# Patient Record
Sex: FEMALE | Race: WHITE | NOT HISPANIC OR LATINO | Employment: OTHER | URBAN - METROPOLITAN AREA
[De-identification: names, ages, dates, MRNs, and addresses within clinical notes are randomized per-mention and may not be internally consistent; named-entity substitution may affect disease eponyms.]

---

## 2017-01-03 ENCOUNTER — OFFICE VISIT (OUTPATIENT)
Dept: OPHTHALMOLOGY | Facility: CLINIC | Age: 82
End: 2017-01-03
Payer: MEDICARE

## 2017-01-03 DIAGNOSIS — H40.1122 PRIMARY OPEN ANGLE GLAUCOMA OF LEFT EYE, MODERATE STAGE: ICD-10-CM

## 2017-01-03 DIAGNOSIS — H21.561 AFFERENT PUPILLARY DEFECT, RIGHT: ICD-10-CM

## 2017-01-03 DIAGNOSIS — H35.3230 BILATERAL EXUDATIVE AGE-RELATED MACULAR DEGENERATION: ICD-10-CM

## 2017-01-03 DIAGNOSIS — H43.813 POSTERIOR VITREOUS DETACHMENT, BOTH EYES: ICD-10-CM

## 2017-01-03 DIAGNOSIS — H40.1413 PSEUDOEXFOLIATIVE GLAUCOMA, RIGHT EYE, SEVERE STAGE: Primary | ICD-10-CM

## 2017-01-03 DIAGNOSIS — Z96.1 PSEUDOPHAKIA OF BOTH EYES: ICD-10-CM

## 2017-01-03 DIAGNOSIS — H02.409 PTOSIS OF EYELID, UNSPECIFIED LATERALITY: ICD-10-CM

## 2017-01-03 DIAGNOSIS — M35.01 KCS (KERATOCONJUNCTIVITIS SICCA): ICD-10-CM

## 2017-01-03 PROCEDURE — 92012 INTRM OPH EXAM EST PATIENT: CPT | Mod: S$PBB,,, | Performed by: OPHTHALMOLOGY

## 2017-01-03 PROCEDURE — 99212 OFFICE O/P EST SF 10 MIN: CPT | Mod: PBBFAC | Performed by: OPHTHALMOLOGY

## 2017-01-03 PROCEDURE — 99999 PR PBB SHADOW E&M-EST. PATIENT-LVL II: CPT | Mod: PBBFAC,,, | Performed by: OPHTHALMOLOGY

## 2017-01-03 NOTE — PROGRESS NOTES
HPI     DLS: 8/26/16    Pt here for 4 month IOP check;    Meds: No GTTS    1. Primary open angle glaucoma of left eye, moderate stage  2. Pseudoexfoliation glaucoma, severe stage  3. Ptosis of eyelid, unspecified laterality  4. Bilateral exudative age-related macular degeneration  5. Pseudoexfoliative glaucoma, right eye, severe stage  6. Posterior vitreous detachment, both eyes  7. Conjunctivitis, unspecified conjunctivitis type, unspecified laterality  8. Bilateral noexudative age-related macular degeneration  9. Pseudophakia, both eyes          Last edited by Briseida Mendoza on 1/3/2017  9:30 AM.         Assessment /Plan     For exam results, see Encounter Report.    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 Open-angle glaucoma, moderate stage   PsEx - moderate stage OD // POAG - mild stage os    First HVF   2013   First photos   5/2014   Treatment / Drops started   combigan, travatan (started in [place] after [place])            Family history    + both parents        Glaucoma meds    Off all gtts  (use to use Combigan od , travatan od )         H/O adverse rxn to glaucoma drops    Stopped combigan 2/2 low BP // stopped PG's 2/2 wet armd // stopped dorzolamide 2/2 dermatitis (( allergic conj od 2/2  "alphagan P and/or willard)         LASERS    SLT od 10/15/2015 - no response (IOP went up)         GLAUCOMA SURGERIES    Ahmed OD 3/2/2016        OTHER EYE SURGERIES    CE/IOL OU        CDR    0.9/0.4        Tbase    10-19/10 - 16          Tmax    19/16 (on gtts)            Ttarget    17/17             HVF    3 test 2013 to  2015 - SAD od // gen dep - new  os        Gonio    +3-4 OU        CCT    519/528        OCT    1 test 2014 to 2014 - RNFL - dec I, bord S od // wnl os        HRT    4 test 2014 to 2016 - MR -  High std dev. od // dec. I os /// CDR xx od // 0.55 os         Disc photos    2014 - OIS    - Ttoday  16//15  (( priviously 41/ 17 (off all gtts - 9/21/2015))   - Test today: IOP post ahmed od    2. Posterior vitreous detachment, both eyes      3. Bilateral exudative age-related macular degeneration s/p avastin ou  Loas avastin 11/24/2015 - ou - [facility]      4 Ptosis OS  S/P lid lift ou   Still with mild ptosis OS     5. +APD OD - mild     6. WILLIAM  Uses systane      7.  Pseudophakia OU  Done in [place] after [place]         Plan:  Most likely allergic conjunctivitiis 2/2 to either alphagan or willard od   Possibly is a viral conjuntivitis - but more likely 2/2 glaucoma gtts   PLAN - take a "vacation " from all glaucoma gtts od   IOP high od off all gtts - had ahmed od 3/2/2016 - IOP ok post op   Intolerant to all gtts   Still with mild hyperemia od     PsExGl od - severe / POAG os - mild  - very assymetric c/d OU- mildly thin corneas- open on gonio- +family history  Patient was started on glaucoma treatment after [place]- went to [place] after [place]- now back here  Was seen by Dr. Blake ([facility])  and Dr. Ontiveros and sent for further glaucoma eval  HVF show SAD OU however appears to have worsened OS- patient with significant ptosis OS- could be lid artifact?-   Optic disc OS does not appear to be as bad as HVF shows- looks very healthy    ++ APD od - old    If needs yag cap od - may have weak zonules - " + PsEx at pupil margin  Minimal pco - NOT vis sign    Seeing [facility] - PED - s/p avastin x 2 ou - keep F/u appt for ? More avastin   VA os improving  + armd - see OCT doen 4/7/2015 // PED w/ SR fluid ou   S/P last avastin 1/26/2016 (VA was poor od at 20/200E@6' and much better os at that visit)       S/P  ahmed implant od 3/2/2016   Intol to all gtts   Off PG's per [facility]  Intol to dorzolamide - dermatitis  Intol to alphagan - marked hyperemia  Intol to willard - hypermia   -into to BB - low BP and HR     POM 9 s/p GDD / ahmed od to try and control IOP od on 3/2/16 ++ PsExGl)   -Pressure doing well today at 16 OD    Currently on no drops      F/U 4 months - with HVF - try 24-2 stim V od and 24-2 ss os // DFE // OCT     Saw [facility] 8/2016 - no need for glasses

## 2017-01-17 ENCOUNTER — PROCEDURE VISIT (OUTPATIENT)
Dept: OPHTHALMOLOGY | Facility: CLINIC | Age: 82
End: 2017-01-17
Payer: MEDICARE

## 2017-01-17 VITALS — SYSTOLIC BLOOD PRESSURE: 131 MMHG | DIASTOLIC BLOOD PRESSURE: 70 MMHG | HEART RATE: 63 BPM

## 2017-01-17 DIAGNOSIS — H43.813 POSTERIOR VITREOUS DETACHMENT, BOTH EYES: ICD-10-CM

## 2017-01-17 DIAGNOSIS — H35.3231 EXUDATIVE AGE-RELATED MACULAR DEGENERATION, BILATERAL, WITH ACTIVE CHOROIDAL NEOVASCULARIZATION: Primary | ICD-10-CM

## 2017-01-17 PROCEDURE — 67028 INJECTION EYE DRUG: CPT | Mod: PBBFAC,RT | Performed by: OPHTHALMOLOGY

## 2017-01-17 PROCEDURE — C9257 BEVACIZUMAB INJECTION: HCPCS | Mod: PBBFAC | Performed by: OPHTHALMOLOGY

## 2017-01-17 PROCEDURE — 67028 INJECTION EYE DRUG: CPT | Mod: S$PBB,RT,, | Performed by: OPHTHALMOLOGY

## 2017-01-17 PROCEDURE — 92014 COMPRE OPH EXAM EST PT 1/>: CPT | Mod: 25,S$PBB,, | Performed by: OPHTHALMOLOGY

## 2017-01-17 PROCEDURE — 92134 CPTRZ OPH DX IMG PST SGM RTA: CPT | Mod: PBBFAC | Performed by: OPHTHALMOLOGY

## 2017-01-17 RX ADMIN — BEVACIZUMAB 1.25 MG: 100 INJECTION, SOLUTION INTRAVENOUS at 02:01

## 2017-01-17 NOTE — PROGRESS NOTES
OCT - Central PED's with no SRF OD   , OS - stable no SRF    Previous FA - OD - Late CME leakage  OS - minimal late PED leakage    1. AMD OU  - History of drusenoid PED's  - 4/15 - developed foveal SRF OU  - OCT today stable  - S/p Avastin OU x 9   - consider Avastin OD #10 today   - Also with subfoveal RPE loss - may not improve with injections  - AREDS/AG  - CME component OD - DC travatan (06/2015)     Will continue maintenance q 3-4 month OD    2. PCIOL OU    3. Floaters OU    4. POAG   - Off all drops per Dr. Leger, continue f/u as scheduled   - S/p Ahmed valve OD on 3/02/16    5. WILLIAM   - AT 3-4x/day      3-4 months OCT       Risks, benefits, and alternatives to treatment discussed in detail with the patient.  The patient voiced understanding and wished to proceed with the procedure    Injection Procedure Note:  Diagnosis: Wet AMD OD    Topical Proparacaine and Betadine.  Inject Avastin OD at 6:00 @ 3.5-4mm posterior to limbus  Post Operative Dx: Same  Complications: None  Follow up as above.

## 2017-01-17 NOTE — MR AVS SNAPSHOT
[facility] - Ophthalmology  [address]  [facility] [address]  Phone: [phone]  Fax: [phone]                  Elda Cui   2017 1:00 PM   Procedure visit    Description:  Female : 1935   Provider:  OSCAR Osman MD   Department:  [facility] - Ophthalmology           Reason for Visit     Eye Problem           Diagnoses this Visit        Comments    Exudative age-related macular degeneration, bilateral, with active choroidal neovascularization    -  Primary     Posterior vitreous detachment, both eyes                To Do List           Future Appointments        Provider Department Dept Phone    2017 9:15 AM PERIMETRY, HUMP[facility] [phone]    2017 9:45 AM Charlene Leger MD [facility] [phone]      Goals (5 Years of Data)     None      Follow-Up and Disposition     Return in about 4 months (around 2017).      [facility] On Call     [facility] On Call Nurse Care Line - 24/7 Assistance  Registered nurses in the [facility] Center provide clinical advisement, health education, appointment booking, and other advisory services.  Call for this free service at [phone].             Medications           Message regarding Medications     Verify the changes and/or additions to your medication regime listed below are the same as discussed with your clinician today.  If any of these changes or additions are incorrect, please notify your healthcare provider.        These medications were administered today        Dose Freq    bevacizumab (AVASTIN) 2.5 mg/0.10 mL 1.25 mg 1.25 mg Clinic/HOD 1 time    Sig: Inject 0.05 mLs (1.25 mg total) into the eye one time.    Class: Normal    Route: Intraocular           Verify that the below list of medications is an accurate representation of the medications you are currently taking.  If none reported, the list may be blank. If incorrect, please contact your healthcare  provider. Carry this list with you in case of emergency.           Current Medications     ACETAMINOPHEN/PHENYLTOLX CIT (PERCOGESIC)  mg Tab Take 1 tablet by mouth every 4 (four) hours as needed. 1 Tablet Oral    donepezil (ARICEPT) 10 MG tablet Take 1 tablet (10 mg total) by mouth every evening.    fluticasone (FLONASE) 50 mcg/actuation nasal spray 2 sprays by Each Nare route once daily.    glucosamine-chondroitin 500-400 mg tablet Take 1 tablet by mouth 3 (three) times daily.    midodrine (PROAMATINE) 10 MG tablet Take 1 tablet (10 mg total) by mouth 3 (three) times daily.    azelastine (ASTELIN) 137 mcg nasal spray 1-2 sprays (137-274 mcg total) by Nasal route 2 (two) times daily as needed for Rhinitis.           Clinical Reference Information           Vital Signs - Last Recorded  Most recent update: 1/17/2017  1:16 PM by Stacie Laughlin    BP Pulse                131/70 (BP Location: Right arm, Patient Position: Sitting, BP Method: Automatic) 63          Blood Pressure          Most Recent Value    BP  131/70      Allergies as of 1/17/2017     Doxycycline      Immunizations Administered on Date of Encounter - 1/17/2017     None      Orders Placed During Today's Visit      Normal Orders This Visit    Posterior Segment OCT Retina-Both eyes     Prior Authorization Order     Future Labs/Procedures Expected by Expires    Posterior Segment OCT Retina-Both eyes  As directed 1/17/2018

## 2017-01-17 NOTE — PATIENT INSTRUCTIONS

## 2017-01-24 ENCOUNTER — PATIENT MESSAGE (OUTPATIENT)
Dept: NEUROLOGY | Facility: CLINIC | Age: 82
End: 2017-01-24

## 2017-01-25 NOTE — TELEPHONE ENCOUNTER
Can reorder Namenda CR 28 mg daily.  Could you please call patient to see if she had the Namenda CR starter pack or the Namenda generic starter pack. She will either need Namenda 10 mg twice a day or Namenda CR 28 mg daily.

## 2017-01-26 RX ORDER — MEMANTINE HYDROCHLORIDE 28 MG/1
1 CAPSULE, EXTENDED RELEASE ORAL DAILY
Qty: 30 EACH | Refills: 11 | Status: SHIPPED | OUTPATIENT
Start: 2017-01-26 | End: 2018-01-08 | Stop reason: SDUPTHER

## 2017-01-26 RX ORDER — MIDODRINE HYDROCHLORIDE 10 MG/1
10 TABLET ORAL 3 TIMES DAILY
Qty: 90 TABLET | Refills: 11 | Status: SHIPPED | OUTPATIENT
Start: 2017-01-26 | End: 2017-03-31 | Stop reason: SDUPTHER

## 2017-02-23 RX ORDER — DONEPEZIL HYDROCHLORIDE 10 MG/1
TABLET, FILM COATED ORAL
Qty: 30 TABLET | Refills: 0 | Status: SHIPPED | OUTPATIENT
Start: 2017-02-23 | End: 2017-03-23 | Stop reason: SDUPTHER

## 2017-03-03 ENCOUNTER — OFFICE VISIT (OUTPATIENT)
Dept: FAMILY MEDICINE | Facility: CLINIC | Age: 82
End: 2017-03-03
Payer: MEDICARE

## 2017-03-03 ENCOUNTER — LAB VISIT (OUTPATIENT)
Dept: LAB | Facility: HOSPITAL | Age: 82
End: 2017-03-03
Attending: FAMILY MEDICINE
Payer: MEDICARE

## 2017-03-03 VITALS
WEIGHT: 158.31 LBS | OXYGEN SATURATION: 97 % | RESPIRATION RATE: 12 BRPM | DIASTOLIC BLOOD PRESSURE: 76 MMHG | SYSTOLIC BLOOD PRESSURE: 134 MMHG | BODY MASS INDEX: 28.05 KG/M2 | TEMPERATURE: 98 F | HEART RATE: 54 BPM | HEIGHT: 63 IN

## 2017-03-03 DIAGNOSIS — E78.5 HYPERLIPIDEMIA, UNSPECIFIED HYPERLIPIDEMIA TYPE: ICD-10-CM

## 2017-03-03 DIAGNOSIS — Z23 NEED FOR PNEUMOCOCCAL VACCINATION: ICD-10-CM

## 2017-03-03 DIAGNOSIS — M94.9 DISORDER OF BONE AND CARTILAGE: ICD-10-CM

## 2017-03-03 DIAGNOSIS — M89.9 DISORDER OF BONE AND CARTILAGE: ICD-10-CM

## 2017-03-03 DIAGNOSIS — M54.50 ACUTE RIGHT-SIDED LOW BACK PAIN WITHOUT SCIATICA: Primary | ICD-10-CM

## 2017-03-03 DIAGNOSIS — F03.90 DEMENTIA WITHOUT BEHAVIORAL DISTURBANCE, UNSPECIFIED DEMENTIA TYPE: ICD-10-CM

## 2017-03-03 LAB
ALBUMIN SERPL BCP-MCNC: 3.8 G/DL
ALP SERPL-CCNC: 84 U/L
ALT SERPL W/O P-5'-P-CCNC: 29 U/L
ANION GAP SERPL CALC-SCNC: 8 MMOL/L
AST SERPL-CCNC: 30 U/L
BILIRUB SERPL-MCNC: 0.4 MG/DL
BUN SERPL-MCNC: 16 MG/DL
CALCIUM SERPL-MCNC: 9.3 MG/DL
CHLORIDE SERPL-SCNC: 105 MMOL/L
CHOLEST/HDLC SERPL: 4.8 {RATIO}
CO2 SERPL-SCNC: 27 MMOL/L
CREAT SERPL-MCNC: 0.8 MG/DL
EST. GFR  (AFRICAN AMERICAN): >60 ML/MIN/1.73 M^2
EST. GFR  (NON AFRICAN AMERICAN): >60 ML/MIN/1.73 M^2
GLUCOSE SERPL-MCNC: 92 MG/DL
HDL/CHOLESTEROL RATIO: 20.8 %
HDLC SERPL-MCNC: 260 MG/DL
HDLC SERPL-MCNC: 54 MG/DL
LDLC SERPL CALC-MCNC: 158.8 MG/DL
NONHDLC SERPL-MCNC: 206 MG/DL
POTASSIUM SERPL-SCNC: 4.3 MMOL/L
PROT SERPL-MCNC: 7.2 G/DL
SODIUM SERPL-SCNC: 140 MMOL/L
TRIGL SERPL-MCNC: 236 MG/DL

## 2017-03-03 PROCEDURE — 99214 OFFICE O/P EST MOD 30 MIN: CPT | Mod: S$PBB,,, | Performed by: FAMILY MEDICINE

## 2017-03-03 PROCEDURE — 36415 COLL VENOUS BLD VENIPUNCTURE: CPT | Mod: PO

## 2017-03-03 PROCEDURE — 80061 LIPID PANEL: CPT

## 2017-03-03 PROCEDURE — 99999 PR PBB SHADOW E&M-EST. PATIENT-LVL III: CPT | Mod: PBBFAC,,, | Performed by: FAMILY MEDICINE

## 2017-03-03 PROCEDURE — 80053 COMPREHEN METABOLIC PANEL: CPT

## 2017-03-03 NOTE — PROGRESS NOTES
Pt tolerated pneumococcal 13 vaccine to left deltoid without difficulty; no adverse reaction noted; VIS given

## 2017-03-03 NOTE — PROGRESS NOTES
Subjective:       Patient ID: Elda Cui is a 82 y.o. female.    Chief Complaint: Back Pain (right sided lower back pain)    Back Pain   This is a new problem. Episode onset: 2 weeks. The problem is unchanged. The pain is present in the lumbar spine. The pain does not radiate. The pain is mild. Pertinent negatives include no tingling or weakness. She has tried NSAIDs for the symptoms. The treatment provided mild relief.   Patient with chronic dementia and hyperlipidemia.  She does not take medication for cholesterol. Doing well with Aricept.  Review of Systems   Musculoskeletal: Positive for back pain.   Neurological: Negative for tingling and weakness.       Objective:      Physical Exam   Constitutional: She appears well-developed and well-nourished.   Musculoskeletal: She exhibits tenderness (right SI joint). She exhibits no edema or deformity.   Neurological: She is alert.   Psychiatric: She has a normal mood and affect. Her behavior is normal. Cognition and memory are impaired.         Assessment:       1. Acute right-sided low back pain without sciatica    2. Disorder of bone and cartilage    3. Need for pneumococcal vaccination    4. Hyperlipidemia, unspecified hyperlipidemia type    5. Dementia without behavioral disturbance, unspecified dementia type        Plan:       Acute right-sided low back pain without sciatica  Recommend tylenol as needed for pain    Disorder of bone and cartilage  -     DXA Bone Density Spine And Hip; Future; Expected date: 3/3/17    Need for pneumococcal vaccination  -     Pneumococcal Conjugate Vaccine (13 Valent) (IM)    Hyperlipidemia, unspecified hyperlipidemia type  -     Patient will decide if she wants to start medication.  Lipid panel; Future; Expected date: 3/3/17    Dementia without behavioral disturbance, unspecified dementia type  -     Comprehensive metabolic panel; Future; Expected date: 3/3/17            No Follow-up on file.

## 2017-03-06 ENCOUNTER — TELEPHONE (OUTPATIENT)
Dept: FAMILY MEDICINE | Facility: CLINIC | Age: 82
End: 2017-03-06

## 2017-03-06 ENCOUNTER — PATIENT MESSAGE (OUTPATIENT)
Dept: FAMILY MEDICINE | Facility: CLINIC | Age: 82
End: 2017-03-06

## 2017-03-06 RX ORDER — CELECOXIB 200 MG/1
200 CAPSULE ORAL 2 TIMES DAILY
Qty: 60 CAPSULE | Refills: 0 | Status: SHIPPED | OUTPATIENT
Start: 2017-03-06 | End: 2018-01-05 | Stop reason: SDUPTHER

## 2017-03-06 NOTE — TELEPHONE ENCOUNTER
----- Message from Jade Bautista sent at 3/6/2017  3:37 PM CST -----  Contact: Self  Pt calling to speak to a nurse regarding get a stronger medication. Please call [phone]

## 2017-03-06 NOTE — TELEPHONE ENCOUNTER
Patient notified message for stronger medication sent to MD for review, patient verbalized understanding

## 2017-03-06 NOTE — TELEPHONE ENCOUNTER
Patient notified of results as noted below, patient verbalized understanding. Patient was asked if she wanted to begin medication, patient stated that she is not worried about that.

## 2017-03-06 NOTE — TELEPHONE ENCOUNTER
----- Message from Cindi Hendricks MD sent at 3/4/2017  8:01 PM CST -----  Comprehensive metabolic panel within normal limits.  Cholesterol panel overall improved, but remains elevated.  Please verify patient will like to take cholesterol medication

## 2017-03-08 ENCOUNTER — CLINICAL SUPPORT (OUTPATIENT)
Dept: FAMILY MEDICINE | Facility: CLINIC | Age: 82
End: 2017-03-08
Payer: MEDICARE

## 2017-03-08 ENCOUNTER — PATIENT MESSAGE (OUTPATIENT)
Dept: FAMILY MEDICINE | Facility: CLINIC | Age: 82
End: 2017-03-08

## 2017-03-08 DIAGNOSIS — M54.50 ACUTE RIGHT-SIDED LOW BACK PAIN WITHOUT SCIATICA: Primary | ICD-10-CM

## 2017-03-08 PROCEDURE — 96372 THER/PROPH/DIAG INJ SC/IM: CPT | Mod: PBBFAC,PO

## 2017-03-08 RX ORDER — KETOROLAC TROMETHAMINE 30 MG/ML
30 INJECTION, SOLUTION INTRAMUSCULAR; INTRAVENOUS
Status: COMPLETED | OUTPATIENT
Start: 2017-03-08 | End: 2017-03-08

## 2017-03-08 RX ADMIN — KETOROLAC TROMETHAMINE 30 MG: 30 INJECTION, SOLUTION INTRAMUSCULAR; INTRAVENOUS at 03:03

## 2017-03-08 NOTE — PROGRESS NOTES
Pt tolerated injection of toradol 30mg to right ventrogluteal without difficulty; no adverse reaction noted

## 2017-03-10 ENCOUNTER — HOSPITAL ENCOUNTER (OUTPATIENT)
Dept: RADIOLOGY | Facility: HOSPITAL | Age: 82
Discharge: HOME | End: 2017-03-10
Attending: FAMILY MEDICINE
Payer: MEDICARE

## 2017-03-10 DIAGNOSIS — M94.9 DISORDER OF BONE AND CARTILAGE: ICD-10-CM

## 2017-03-10 DIAGNOSIS — M89.9 DISORDER OF BONE AND CARTILAGE: ICD-10-CM

## 2017-03-10 PROCEDURE — 77080 DXA BONE DENSITY AXIAL: CPT | Mod: TC

## 2017-03-10 PROCEDURE — 77080 DXA BONE DENSITY AXIAL: CPT | Mod: 26,,, | Performed by: RADIOLOGY

## 2017-03-23 RX ORDER — DONEPEZIL HYDROCHLORIDE 10 MG/1
TABLET, FILM COATED ORAL
Qty: 30 TABLET | Refills: 0 | Status: SHIPPED | OUTPATIENT
Start: 2017-03-23 | End: 2017-04-23 | Stop reason: SDUPTHER

## 2017-03-31 ENCOUNTER — TELEPHONE (OUTPATIENT)
Dept: FAMILY MEDICINE | Facility: CLINIC | Age: 82
End: 2017-03-31

## 2017-03-31 DIAGNOSIS — M54.50 LOW BACK PAIN WITHOUT SCIATICA, UNSPECIFIED BACK PAIN LATERALITY, UNSPECIFIED CHRONICITY: Primary | ICD-10-CM

## 2017-03-31 RX ORDER — MIDODRINE HYDROCHLORIDE 10 MG/1
10 TABLET ORAL 3 TIMES DAILY
Qty: 90 TABLET | Refills: 11 | Status: SHIPPED | OUTPATIENT
Start: 2017-03-31 | End: 2017-06-02 | Stop reason: SDUPTHER

## 2017-03-31 NOTE — TELEPHONE ENCOUNTER
----- Message from Sadamolina Lucio sent at 3/31/2017  2:29 PM CDT -----  Contact: self   Pt request a referral for pain management Dr. Aguilar. Please contact the pt at [phone]. Thanks!

## 2017-04-04 ENCOUNTER — TELEPHONE (OUTPATIENT)
Dept: FAMILY MEDICINE | Facility: CLINIC | Age: 82
End: 2017-04-04

## 2017-04-04 NOTE — TELEPHONE ENCOUNTER
----- Message from Aura Gonzalez sent at 4/4/2017  4:29 PM CDT -----  REFERRAL: Faxed patients referral to Dr. Petersen's office.

## 2017-04-23 RX ORDER — DONEPEZIL HYDROCHLORIDE 10 MG/1
TABLET, FILM COATED ORAL
Qty: 30 TABLET | Refills: 0 | Status: SHIPPED | OUTPATIENT
Start: 2017-04-23 | End: 2017-05-16 | Stop reason: SDUPTHER

## 2017-05-05 ENCOUNTER — CLINICAL SUPPORT (OUTPATIENT)
Dept: OPHTHALMOLOGY | Facility: CLINIC | Age: 82
End: 2017-05-05
Payer: MEDICARE

## 2017-05-05 ENCOUNTER — OFFICE VISIT (OUTPATIENT)
Dept: OPHTHALMOLOGY | Facility: CLINIC | Age: 82
End: 2017-05-05
Payer: MEDICARE

## 2017-05-05 DIAGNOSIS — H40.1122 PRIMARY OPEN ANGLE GLAUCOMA OF LEFT EYE, MODERATE STAGE: ICD-10-CM

## 2017-05-05 DIAGNOSIS — H35.3231 EXUDATIVE AGE-RELATED MACULAR DEGENERATION, BILATERAL, WITH ACTIVE CHOROIDAL NEOVASCULARIZATION: Primary | ICD-10-CM

## 2017-05-05 DIAGNOSIS — Z96.89 HISTORY OF GLAUCOMA TUBE SHUNT PROCEDURE: ICD-10-CM

## 2017-05-05 DIAGNOSIS — H40.1413 PSEUDOEXFOLIATIVE GLAUCOMA, RIGHT EYE, SEVERE STAGE: ICD-10-CM

## 2017-05-05 DIAGNOSIS — H21.561 AFFERENT PUPILLARY DEFECT, RIGHT: ICD-10-CM

## 2017-05-05 DIAGNOSIS — M35.01 KCS (KERATOCONJUNCTIVITIS SICCA): ICD-10-CM

## 2017-05-05 DIAGNOSIS — H43.813 POSTERIOR VITREOUS DETACHMENT, BOTH EYES: ICD-10-CM

## 2017-05-05 DIAGNOSIS — H02.409 PTOSIS OF EYELID, UNSPECIFIED LATERALITY: ICD-10-CM

## 2017-05-05 PROCEDURE — 92133 CPTRZD OPH DX IMG PST SGM ON: CPT | Mod: PBBFAC | Performed by: OPHTHALMOLOGY

## 2017-05-05 PROCEDURE — 92083 EXTENDED VISUAL FIELD XM: CPT | Mod: 26,S$PBB,, | Performed by: OPHTHALMOLOGY

## 2017-05-05 PROCEDURE — 99999 PR PBB SHADOW E&M-EST. PATIENT-LVL III: CPT | Mod: PBBFAC,,, | Performed by: OPHTHALMOLOGY

## 2017-05-05 PROCEDURE — 99213 OFFICE O/P EST LOW 20 MIN: CPT | Mod: PBBFAC,25 | Performed by: OPHTHALMOLOGY

## 2017-05-05 PROCEDURE — 92014 COMPRE OPH EXAM EST PT 1/>: CPT | Mod: S$PBB,,, | Performed by: OPHTHALMOLOGY

## 2017-05-05 NOTE — PROGRESS NOTES
HPI     DLS: 1/03/17    Pt here for HVF review;    Meds: Systane od prn     1. Pseudo exfoliative glaucoma, right eye, severe stage  2. Primary open angle glaucoma of left eye, moderate stage  3. Bilateral exudative age-related macular degeneration  4. Posterior vitreous detachment, both eyes  5. Ptosis of eyelid, unspecified laterality  6. Afferent pupillary defect, right  7. KCS (keratoconjunctivitis sicca)  8. Pseudophakia, both eyes        Last edited by Charlene Leger MD on 5/5/2017 12:19 PM.         Assessment /Plan     For exam results, see Encounter Report.    Exudative age-related macular degeneration, bilateral, with active choroidal neovascularization    Pseudoexfoliative glaucoma, right eye, severe stage  -     Posterior Segment OCT Optic Nerve- Both eyes    Primary open angle glaucoma of left eye, moderate stage  -     Posterior Segment OCT Optic Nerve- Both eyes    Posterior vitreous detachment, both eyes    Ptosis of eyelid, unspecified laterality    Afferent pupillary defect, right    KCS (keratoconjunctivitis sicca)    History of glaucoma tube shunt procedure          Lost home in [facility] 2/2 [place]   Displace to [place] for 8 years after [place]  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 Open-angle glaucoma, moderate stage   PsEx - moderate stage OD // POAG - mild stage os    First HVF   2013   First photos   5/2014   Treatment / Drops started   combigan, travatan (started in [place] after [place])            Family history    + both parents        Glaucoma meds    Off all gtts  (use to use Combigan od , travatan od )         H/O adverse rxn to glaucoma drops    Stopped combigan 2/2 low BP // stopped PG's 2/2 wet  armd // stopped dorzolamide 2/2 dermatitis (( allergic conj od 2/2 alphagan P and/or willard)         LASERS    SLT od 10/15/2015 - no response (IOP went up)         GLAUCOMA SURGERIES    Ahmed OD 3/2/2016        OTHER EYE SURGERIES    CE/IOL OU        CDR    0.9/0.4        Tbase    10-19/10 - 16          Tmax    19/16 (on gtts)            Ttarget    17/17             HVF    4 test 2013 to  5/2017 - SAD od // gen dep - new  Os                   SWITCH TO 24-2 STIM V OD - 1 TEST  5/2017 to 5/2017 - SAD         Gonio    +3-4 OU        CCT    519/528        OCT    1 test 2014 to 2014 - RNFL - dec I, bord S od // wnl os        HRT    4 test 2014 to 2016 - MR -  High std dev. od // dec. I os /// CDR xx od // 0.55 os         Disc photos    2014 - OIS    - Ttoday  15//15  (( priviously 41/ 17 (off all gtts - 9/21/2015))   - Test today: IOP post ahmed od dfe // oct   2. Posterior vitreous detachment, both eyes      3. Bilateral exudative age-related macular degeneration s/p avastin ou  Loas avastin 11/24/2015 - ou - [place]      4 Ptosis OS  S/P lid lift ou   Still with mild ptosis OS     5. +APD OD - mild     6. WILLIAM  Uses systane      7.  Pseudophakia OU  Done in [place] after [place]         Plan:  Most likely allergic conjunctivitiis 2/2 to either alphagan or willard od   Possibly is a viral conjuntivitis - but more likely 2/2 glaucoma gtts   Resolved off all gtts and IOP is still ok post ahmed   IOP high od off all gtts - had ahmed od 3/2/2016 - IOP ok post op   Intolerant to all gtts       PsExGl od - severe / POAG os - mild  - very assymetric c/d OU- mildly thin corneas- open on gonio- +family history  Patient was started on glaucoma treatment after [place]- went to [place] after [place]- now back here  Was seen by Dr. Blake ([facility])  and Dr. Ontiveros and sent for further glaucoma eval  HVF show SAD OU however appears to have worsened OS- patient with significant ptosis OS- could be lid artifact?-   Optic disc OS  does not appear to be as bad as HVF shows- looks very healthy    ++ APD od - old    If needs yag cap od - may have weak zonules - + PsEx at pupil margin  Minimal pco - NOT vis sign    Seeing [facility] - PED - s/p avastin x 2 ou - keep F/u appt for ? More avastin   VA os improving  + armd - see OCT done 4/7/2015 // PED w/ SR fluid ou   S/P last avastin 1/26/2016 (VA was poor od at 20/200E@6' and much better os at that visit)       S/P  ahmed implant od 3/2/2016   Intol to all gtts   Off PG's per [facility]  Intol to dorzolamide - dermatitis  Intol to alphagan - marked hyperemia  Intol to willard - hypermia   -into to BB - low BP and HR     POM 9 s/p GDD / ahmed od to try and control IOP od on 3/2/16 ++ PsExGl)   -Pressure doing well today at 16 OD    Currently on no drops      F/U 4 months - for IOP check - all other test are up to date     Saw [facility] 8/2016 - no need for glasses

## 2017-05-16 RX ORDER — DONEPEZIL HYDROCHLORIDE 10 MG/1
TABLET, FILM COATED ORAL
Qty: 30 TABLET | Refills: 11 | Status: SHIPPED | OUTPATIENT
Start: 2017-05-16 | End: 2018-03-16 | Stop reason: SDUPTHER

## 2017-05-19 ENCOUNTER — PROCEDURE VISIT (OUTPATIENT)
Dept: OPHTHALMOLOGY | Facility: CLINIC | Age: 82
End: 2017-05-19
Payer: MEDICARE

## 2017-05-19 VITALS — SYSTOLIC BLOOD PRESSURE: 124 MMHG | DIASTOLIC BLOOD PRESSURE: 70 MMHG

## 2017-05-19 DIAGNOSIS — H35.3231 EXUDATIVE AGE-RELATED MACULAR DEGENERATION, BILATERAL, WITH ACTIVE CHOROIDAL NEOVASCULARIZATION: Primary | ICD-10-CM

## 2017-05-19 DIAGNOSIS — H43.813 POSTERIOR VITREOUS DETACHMENT, BOTH EYES: ICD-10-CM

## 2017-05-19 PROCEDURE — 92226 PR SPECIAL EYE EXAM, SUBSEQUENT: CPT | Mod: 50,PBBFAC | Performed by: OPHTHALMOLOGY

## 2017-05-19 PROCEDURE — 92134 CPTRZ OPH DX IMG PST SGM RTA: CPT | Mod: PBBFAC | Performed by: OPHTHALMOLOGY

## 2017-05-19 PROCEDURE — 92014 COMPRE OPH EXAM EST PT 1/>: CPT | Mod: S$PBB,,, | Performed by: OPHTHALMOLOGY

## 2017-05-19 PROCEDURE — 92226 PR SPECIAL EYE EXAM, SUBSEQUENT: CPT | Mod: 50,S$PBB,, | Performed by: OPHTHALMOLOGY

## 2017-05-19 NOTE — PROGRESS NOTES
OCT - Central PED's with no SRF OD   , OS - stable no SRF    Previous FA - OD - Late CME leakage  OS - minimal late PED leakage    1. AMD OU  - History of drusenoid PED's  - 4/15 - developed foveal SRF OU  - OCT today stable  - S/p Avastin OD x 10, OS x 9     - Also with subfoveal RPE loss - may not improve with injections  - AREDS/AG  - CME component OD - DC travatan (06/2015)     Stable today, try observation    2. PCIOL OU    3. Floaters OU    4. POAG   - Off all drops per Dr. Leger, continue f/u as scheduled   - S/p Ahmed valve OD on 3/02/16    5. WILLIAM   - AT 3-4x/day      3-4 months OCT

## 2017-05-19 NOTE — MR AVS SNAPSHOT
[facility]  [address]  [facility] [address]  Phone: [phone]  Fax: [phone]                  Elda Cui   2017 10:40 AM   Procedure visit    Description:  Female : 1935   Provider:  OSCAR Osman MD   Department:  [facility]           Reason for Visit     Macular Degeneration           Diagnoses this Visit        Comments    Exudative age-related macular degeneration, bilateral, with active choroidal neovascularization    -  Primary     Posterior vitreous detachment, both eyes                To Do List           Goals (5 Years of Data)     None      Follow-Up and Disposition     Return in about 3 months (around 2017).      [facility] On Call     [facility] On Call Nurse Care Line -  Assistance  Unless otherwise directed by your provider, please contact [facility], our nurse care line that is available for  assistance.     Registered nurses in the [facility] On Call Center provide: appointment scheduling, clinical advisement, health education, and other advisory services.  Call: [phone] (toll free)               Medications           Message regarding Medications     Verify the changes and/or additions to your medication regime listed below are the same as discussed with your clinician today.  If any of these changes or additions are incorrect, please notify your healthcare provider.             Verify that the below list of medications is an accurate representation of the medications you are currently taking.  If none reported, the list may be blank. If incorrect, please contact your healthcare provider. Carry this list with you in case of emergency.           Current Medications     ACETAMINOPHEN/PHENYLTOLX CIT (PERCOGESIC)  mg Tab Take 1 tablet by mouth every 4 (four) hours as needed. 1 Tablet Oral    donepezil (ARICEPT) 10 MG tablet Take 1 tablet (10 mg total) by mouth every evening.    donepezil  (ARICEPT) 10 MG tablet TAKE 1 TABLET BY MOUTH EVERY EVENING    fluticasone (FLONASE) 50 mcg/actuation nasal spray 2 sprays by Each Nare route once daily.    glucosamine-chondroitin 500-400 mg tablet Take 1 tablet by mouth 3 (three) times daily.    memantine (NAMENDA XR) 28 mg CSpX Take 1 capsule by mouth once daily.    midodrine (PROAMATINE) 10 MG tablet Take 1 tablet (10 mg total) by mouth 3 (three) times daily.    azelastine (ASTELIN) 137 mcg nasal spray 1-2 sprays (137-274 mcg total) by Nasal route 2 (two) times daily as needed for Rhinitis.           Clinical Reference Information           Your Vitals Were     BP                   124/70 (BP Location: Right arm, Patient Position: Sitting, BP Method: Automatic)           Blood Pressure          Most Recent Value    BP  124/70      Allergies as of 5/19/2017     Doxycycline      Immunizations Administered on Date of Encounter - 5/19/2017     None      Orders Placed During Today's Visit      Normal Orders This Visit    Posterior Segment OCT Retina-Both eyes     Posterior Segment OCT Retina-Both eyes       Language Assistance Services     ATTENTION: Language assistance services are available, free of charge. Please call [phone].      ATENCIÓN: Si habla español, tiene a soto disposición servicios gratuitos de asistencia lingüística. Llame al [phone].     MATT Ý: N?u b?n nói Ti?ng Vi?t, có các d?ch v? h? tr? ngôn ng? mi?n phí dành cho b?n. G?i s? [phone].         [facility] complies with applicable Federal civil rights laws and does not discriminate on the basis of race, color, national origin, age, disability, or sex.

## 2017-06-02 ENCOUNTER — PATIENT MESSAGE (OUTPATIENT)
Dept: CARDIOLOGY | Facility: CLINIC | Age: 82
End: 2017-06-02

## 2017-06-02 RX ORDER — MIDODRINE HYDROCHLORIDE 10 MG/1
10 TABLET ORAL 3 TIMES DAILY
Qty: 90 TABLET | Refills: 11 | Status: SHIPPED | OUTPATIENT
Start: 2017-06-02 | End: 2017-06-13 | Stop reason: SDUPTHER

## 2017-06-13 ENCOUNTER — PATIENT MESSAGE (OUTPATIENT)
Dept: CARDIOLOGY | Facility: CLINIC | Age: 82
End: 2017-06-13

## 2017-06-13 RX ORDER — MIDODRINE HYDROCHLORIDE 10 MG/1
10 TABLET ORAL 3 TIMES DAILY
Qty: 270 TABLET | Refills: 3 | Status: SHIPPED | OUTPATIENT
Start: 2017-06-13 | End: 2018-03-16 | Stop reason: SDUPTHER

## 2017-07-11 ENCOUNTER — PATIENT MESSAGE (OUTPATIENT)
Dept: NEUROLOGY | Facility: CLINIC | Age: 82
End: 2017-07-11

## 2017-08-07 ENCOUNTER — OFFICE VISIT (OUTPATIENT)
Dept: OPHTHALMOLOGY | Facility: CLINIC | Age: 82
End: 2017-08-07
Payer: MEDICARE

## 2017-08-07 DIAGNOSIS — H02.409 PTOSIS OF EYELID, UNSPECIFIED LATERALITY: ICD-10-CM

## 2017-08-07 DIAGNOSIS — M35.01 KCS (KERATOCONJUNCTIVITIS SICCA): ICD-10-CM

## 2017-08-07 DIAGNOSIS — H21.561 AFFERENT PUPILLARY DEFECT, RIGHT: ICD-10-CM

## 2017-08-07 DIAGNOSIS — H35.3231 EXUDATIVE AGE-RELATED MACULAR DEGENERATION, BILATERAL, WITH ACTIVE CHOROIDAL NEOVASCULARIZATION: ICD-10-CM

## 2017-08-07 DIAGNOSIS — Z96.89 HISTORY OF GLAUCOMA TUBE SHUNT PROCEDURE: ICD-10-CM

## 2017-08-07 DIAGNOSIS — H40.1413 PSEUDOEXFOLIATIVE GLAUCOMA, RIGHT EYE, SEVERE STAGE: Primary | ICD-10-CM

## 2017-08-07 DIAGNOSIS — H43.813 POSTERIOR VITREOUS DETACHMENT, BOTH EYES: ICD-10-CM

## 2017-08-07 DIAGNOSIS — H40.1122 PRIMARY OPEN ANGLE GLAUCOMA OF LEFT EYE, MODERATE STAGE: ICD-10-CM

## 2017-08-07 PROCEDURE — 99999 PR PBB SHADOW E&M-EST. PATIENT-LVL II: CPT | Mod: PBBFAC,,, | Performed by: OPHTHALMOLOGY

## 2017-08-07 PROCEDURE — 92012 INTRM OPH EXAM EST PATIENT: CPT | Mod: S$PBB,,, | Performed by: OPHTHALMOLOGY

## 2017-08-07 PROCEDURE — 99212 OFFICE O/P EST SF 10 MIN: CPT | Mod: PBBFAC | Performed by: OPHTHALMOLOGY

## 2017-08-07 NOTE — PROGRESS NOTES
HPI     DLS: 5/05/17    Pt here for 3 month check;    Meds: Systane ou prn     1. Exudative age-related macular degeneration, bilateral, with active   choroidal neovascularization  2. Pseudoexfoliative glaucoma, right eye, severe stage  3. Primary open angle glaucoma of left eye, moderate stage  4. Posterior vitreous detachment, both eyes  5. Ptosis of eyelid, unspecified laterality   6. Afferent pupillary defect, right  7. KCS (keratoconjunctiviits sicca)  8. History of glaucoma tube shunt procedure     Last edited by Briseida Mendoza on 8/7/2017  9:59 AM. (History)            Assessment /Plan     For exam results, see Encounter Report.    Pseudoexfoliative glaucoma, right eye, severe stage  -     [facility] [place] [facility] (HRT) - OU - Both Eyes; Future    Primary open angle glaucoma of left eye, moderate stage  -     [facility] (HRT) - OU - Both Eyes; Future    Exudative age-related macular degeneration, bilateral, with active choroidal neovascularization    Posterior vitreous detachment, both eyes    Afferent pupillary defect, right    Ptosis of eyelid, unspecified laterality    KCS (keratoconjunctivitis sicca)    History of glaucoma tube shunt procedure          Lost home in [facility] 2/2 [place]   Displace to [place] for 8 years after [place]  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 Open-angle glaucoma, moderate stage   PsEx - moderate stage OD // POAG - mild stage os    First HVF   2013   First photos   5/2014   Treatment / Drops started   combigan, travatan (started in [place] after [place])            Family history    + both parents        Glaucoma meds    Off all gtts  (use to use Combigan od , travatan od )          H/O adverse rxn to glaucoma drops    Stopped combigan 2/2 low BP // stopped PG's 2/2 wet armd // stopped dorzolamide 2/2 dermatitis (( allergic conj od 2/2 alphagan P and/or willard)         LASERS    SLT od 10/15/2015 - no response (IOP went up)         GLAUCOMA SURGERIES    Ahmed OD 3/2/2016        OTHER EYE SURGERIES    CE/IOL OU        CDR    0.9/0.4        Tbase    10-19/10 - 16          Tmax    19/16 (on gtts)            Ttarget    17/17             HVF    4 test 2013 to  5/2017 - SAD od // gen dep - new  Os                   SWITCH TO 24-2 STIM V OD - 1 TEST  5/2017 to 5/2017 - SAD         Gonio    +3-4 OU        CCT    519/528        OCT    1 test 2014 to 2014 - RNFL - dec I, bord S od // wnl os        HRT    4 test 2014 to 2016 - MR -  High std dev. od // dec. I os /// CDR xx od // 0.55 os         Disc photos    2014 - OIS    - Ttoday  12/12  (( priviously 41/ 17 (off all gtts - 9/21/2015))   - Test today: IOP post ahmed od   2. Posterior vitreous detachment, both eyes      3. Bilateral exudative age-related macular degeneration s/p avastin ou  Loas avastin 11/24/2015 - ou - [facility]      4 Ptosis OS  S/P lid lift ou   Still with mild ptosis OS     5. +APD OD - mild     6. WILLIAM  Uses systane      7.  Pseudophakia OU  Done in [place] after [place]         Plan:  Most likely allergic conjunctivitiis 2/2 to either alphagan or willard od   Possibly is a viral conjuntivitis - but more likely 2/2 glaucoma gtts   Resolved off all gtts and IOP is still ok post ahmed   IOP high od off all gtts - had ahmed od 3/2/2016 - IOP ok post op   Intolerant to all gtts       PsExGl od - severe / POAG os - mild  - very assymetric c/d OU- mildly thin corneas- open on gonio- +family history  Patient was started on glaucoma treatment after [place]- went to [place] after [place]- now back here  Was seen by Dr. Blake ([facility])  and Dr. Weston and sent for further glaucoma eval  HVF show SAD OU however appears to have worsened  OS- patient with significant ptosis OS- could be lid artifact?-   Optic disc OS does not appear to be as bad as HVF shows- looks very healthy    ++ APD od - old    If needs yag cap od - may have weak zonules - + PsEx at pupil margin  Minimal pco - NOT vis sign    Seeing [facility] - PED - s/p avastin x 2 ou - keep F/u appt for ? More avastin   VA os improving  + armd - see OCT done 4/7/2015 // PED w/ SR fluid ou   S/P last avastin 1/26/2016 (VA was poor od at 20/200E@6' and much better os at that visit)       S/P  ahmed implant od 3/2/2016   Intol to all gtts   Off PG's per [facility]  Intol to dorzolamide - dermatitis  Intol to alphagan - marked hyperemia  Intol to willard - hypermia   -intol to BB - low BP and HR     s/p GDD / ahmed od to try and control IOP od on 3/2/16 ++ PsExGl)   -Pressure doing well today at 12 OD    Currently on no drops      F/U 4 months - for IOP check -HRT / gonio     Saw [facility] 8/2016 - no need for glasses

## 2017-08-22 ENCOUNTER — PROCEDURE VISIT (OUTPATIENT)
Dept: OPHTHALMOLOGY | Facility: CLINIC | Age: 82
End: 2017-08-22
Payer: MEDICARE

## 2017-08-22 VITALS — SYSTOLIC BLOOD PRESSURE: 138 MMHG | DIASTOLIC BLOOD PRESSURE: 75 MMHG | HEART RATE: 64 BPM

## 2017-08-22 DIAGNOSIS — H43.813 POSTERIOR VITREOUS DETACHMENT, BOTH EYES: ICD-10-CM

## 2017-08-22 DIAGNOSIS — H35.3231 EXUDATIVE AGE-RELATED MACULAR DEGENERATION, BILATERAL, WITH ACTIVE CHOROIDAL NEOVASCULARIZATION: ICD-10-CM

## 2017-08-22 DIAGNOSIS — H40.1122 PRIMARY OPEN ANGLE GLAUCOMA OF LEFT EYE, MODERATE STAGE: ICD-10-CM

## 2017-08-22 DIAGNOSIS — H35.3231 EXUDATIVE AGE-RELATED MACULAR DEGENERATION OF BOTH EYES WITH ACTIVE CHOROIDAL NEOVASCULARIZATION: Primary | ICD-10-CM

## 2017-08-22 PROCEDURE — 92014 COMPRE OPH EXAM EST PT 1/>: CPT | Mod: S$PBB,,, | Performed by: OPHTHALMOLOGY

## 2017-08-22 PROCEDURE — 92226 PR SPECIAL EYE EXAM, SUBSEQUENT: CPT | Mod: 50,PBBFAC | Performed by: OPHTHALMOLOGY

## 2017-08-22 PROCEDURE — 92226 PR SPECIAL EYE EXAM, SUBSEQUENT: CPT | Mod: 50,S$PBB,, | Performed by: OPHTHALMOLOGY

## 2017-08-22 NOTE — PROGRESS NOTES
HPI     Eye Problem    Additional comments: 3 mos check           Comments   DLS 5/19/17- Vision OD has gotten worse both distance and near since last   visit      OCT - Central PED's with no SRF OD   , OS - stable no SRF    Previous FA - OD - Late CME leakage  OS - minimal late PED leakage    1. AMD OU  - History of drusenoid PED's  - 4/15 - developed foveal SRF OU  - OCT today stable  - S/p Avastin OD x 10, OS x 9     - Also with subfoveal RPE loss - may not improve with injections  - AREDS/AG  - CME component OD - DC travatan (06/2015)     Stable today, continue observation    2. PCIOL OU    3. Floaters OU    4. POAG   - Off all drops per Dr. Leger, continue f/u as scheduled   - S/p Ahmed valve OD on 3/02/16    5. WILLIAM   - AT 3-4x/day      6 months OCT

## 2017-12-11 ENCOUNTER — OFFICE VISIT (OUTPATIENT)
Dept: OPHTHALMOLOGY | Facility: CLINIC | Age: 82
End: 2017-12-11
Attending: OPHTHALMOLOGY
Payer: MEDICARE

## 2017-12-11 DIAGNOSIS — H02.409 PTOSIS OF EYELID, UNSPECIFIED LATERALITY: ICD-10-CM

## 2017-12-11 DIAGNOSIS — Z96.1 PSEUDOPHAKIA OF BOTH EYES: ICD-10-CM

## 2017-12-11 DIAGNOSIS — H40.1122 PRIMARY OPEN ANGLE GLAUCOMA OF LEFT EYE, MODERATE STAGE: ICD-10-CM

## 2017-12-11 DIAGNOSIS — H35.3231 EXUDATIVE AGE-RELATED MACULAR DEGENERATION, BILATERAL, WITH ACTIVE CHOROIDAL NEOVASCULARIZATION: ICD-10-CM

## 2017-12-11 DIAGNOSIS — H40.1413 PSEUDOEXFOLIATIVE GLAUCOMA, RIGHT EYE, SEVERE STAGE: ICD-10-CM

## 2017-12-11 DIAGNOSIS — M35.01 KCS (KERATOCONJUNCTIVITIS SICCA): ICD-10-CM

## 2017-12-11 DIAGNOSIS — H21.561 AFFERENT PUPILLARY DEFECT, RIGHT: ICD-10-CM

## 2017-12-11 DIAGNOSIS — Z96.89 HISTORY OF GLAUCOMA TUBE SHUNT PROCEDURE: ICD-10-CM

## 2017-12-11 DIAGNOSIS — H43.813 POSTERIOR VITREOUS DETACHMENT, BOTH EYES: ICD-10-CM

## 2017-12-11 DIAGNOSIS — H40.1413 PSEUDOEXFOLIATIVE GLAUCOMA, RIGHT EYE, SEVERE STAGE: Primary | ICD-10-CM

## 2017-12-11 PROCEDURE — 92134 CPTRZ OPH DX IMG PST SGM RTA: CPT | Mod: 26,S$PBB,, | Performed by: OPHTHALMOLOGY

## 2017-12-11 PROCEDURE — 92134 CPTRZ OPH DX IMG PST SGM RTA: CPT | Mod: 50,PBBFAC

## 2017-12-11 PROCEDURE — 92012 INTRM OPH EXAM EST PATIENT: CPT | Mod: S$PBB,,, | Performed by: OPHTHALMOLOGY

## 2017-12-11 PROCEDURE — 99999 PR PBB SHADOW E&M-EST. PATIENT-LVL II: CPT | Mod: PBBFAC,,, | Performed by: OPHTHALMOLOGY

## 2017-12-11 PROCEDURE — 99212 OFFICE O/P EST SF 10 MIN: CPT | Mod: PBBFAC,25 | Performed by: OPHTHALMOLOGY

## 2017-12-11 NOTE — PROGRESS NOTES
HPI     DLS: 5/05/17    Pt here for HRT review:  Patient states her vision is stable since her last visit. But vision is   not getting any better.     Meds: Systane ou prn     1. Exudative age-related macular degeneration, bilateral, with active   choroidal neovascularization  2. Pseudoexfoliative glaucoma, right eye, severe stage  3. Primary open angle glaucoma of left eye, moderate stage  4. Posterior vitreous detachment, both eyes  5. Ptosis of eyelid, unspecified laterality   6. Afferent pupillary defect, right  7. KCS (keratoconjunctiviits sicca)  8. History of glaucoma tube shunt procedure     Last edited by Diana Beck on 12/11/2017 10:55 AM. (History)            Assessment /Plan     For exam results, see Encounter Report.    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 Open-angle glaucoma, moderate stage   PsEx - moderate stage OD // POAG - mild stage os    First HVF   2013   First photos   5/2014   Treatment / Drops started   combigan, travatan (started in [place] after [place])            Family history    + both parents        Glaucoma meds    Off all gtts  (use to use Combigan od , travatan od )          H/O adverse rxn to glaucoma drops    Stopped combigan 2/2 low BP // stopped PG's 2/2 wet armd // stopped dorzolamide 2/2 dermatitis (( allergic conj od 2/2 alphagan P and/or willard)         LASERS    SLT od 10/15/2015 - no response (IOP went up)         GLAUCOMA SURGERIES    Ahmed OD 3/2/2016        OTHER EYE SURGERIES    CE/IOL OU        CDR    0.9/0.4        Tbase    10-19/10 - 16          Tmax    19/16 (on gtts)            Ttarget    17/17             HVF    4 test 2013 to  5/2017 - SAD od // gen dep - new  Os                   SWITCH TO 24-2 STIM V OD - 1 TEST  5/2017 to 5/2017 - SAD         Gonio    +3-4 OU        CCT    519/528        OCT    1 test 2014 to 2014 - RNFL - dec I, bord S od // wnl os        HRT    4 test 2014 to 2016 - MR -  High std dev. od // dec. I os /// CDR xx od // 0.55 os         Disc photos    2014 - OIS    - Ttoday  14/16  (( priviously 41/ 17 (off all gtts - 9/21/2015))   - Test today: HRT    2. Posterior vitreous detachment, both eyes      3. Bilateral exudative age-related macular degeneration s/p avastin ou  Loas avastin 11/24/2015 - ou - [facility]      4 Ptosis OS  S/P lid lift ou   Still with mild ptosis OS     5. +APD OD - mild     6. WILLIAM  Uses systane      7.  Pseudophakia OU  Done in [place] after [place]         Plan:  H/O red / irritated eys   Resolved off all gtts and IOP is still ok post ahmed   IOP ok  od off all gtts - had ahmed od 3/2/2016 - IOP ok post op   Intolerant to all gtts       PsExGl od - severe / POAG os - mild  - very assymetric c/d OU- mildly thin corneas- open on gonio- +family history  Patient was started on glaucoma treatment after [place]- went to [place] after [place]- now back here  Was seen by Dr. Blake ([facility] [place])  and Dr. Ontiveros and sent for further glaucoma eval  HVF show SAD OU however appears to have worsened OS- patient with significant ptosis OS- could be lid artifact?-   Optic disc OS does not appear to be as bad as HVF shows- looks  very healthy    ++ APD od - old    If needs yag cap od - may have weak zonules - + PsEx at pupil margin  Minimal pco - NOT vis sign    Seeing [facility] - PED - s/p avastin x 2 ou - keep F/u appt for ? More avastin   VA os improving  + armd - see OCT done 4/7/2015 // PED w/ SR fluid ou   S/P last avastin 1/26/2016 (VA was poor od at 20/200E@6' and much better os at that visit)       S/P  ahmed implant od 3/2/2016   Intol to all gtts   Off PG's per [facility]  Intol to dorzolamide - dermatitis  Intol to alphagan - marked hyperemia  Intol to willard - hypermia   -intol to BB - low BP and HR     s/p GDD / ahmed od to try and control IOP od on 3/2/16 ++ PsExGl)   -Pressure doing well today at 14 OD    Currently on no drops      F/U 4 months - with HVF - 24-2 stim V od and 24-2 ss os //DFE //  disc photos     Saw [facility] 8/2016 - no need for glasses

## 2017-12-24 ENCOUNTER — PATIENT MESSAGE (OUTPATIENT)
Dept: NEUROLOGY | Facility: CLINIC | Age: 82
End: 2017-12-24

## 2017-12-26 RX ORDER — DONEPEZIL HYDROCHLORIDE 10 MG/1
10 TABLET, FILM COATED ORAL NIGHTLY
Qty: 30 TABLET | Refills: 11 | Status: SHIPPED | OUTPATIENT
Start: 2017-12-26 | End: 2018-03-16

## 2018-01-05 RX ORDER — MIDODRINE HYDROCHLORIDE 10 MG/1
TABLET ORAL
Qty: 90 TABLET | Refills: 0 | Status: SHIPPED | OUTPATIENT
Start: 2018-01-05 | End: 2018-03-16

## 2018-01-08 RX ORDER — CELECOXIB 200 MG/1
CAPSULE ORAL
Qty: 60 CAPSULE | Refills: 0 | Status: SHIPPED | OUTPATIENT
Start: 2018-01-08 | End: 2018-03-16

## 2018-01-08 RX ORDER — MEMANTINE HYDROCHLORIDE 28 MG/1
1 CAPSULE, EXTENDED RELEASE ORAL DAILY
Qty: 30 EACH | Refills: 11 | Status: SHIPPED | OUTPATIENT
Start: 2018-01-08 | End: 2018-03-16 | Stop reason: SDUPTHER

## 2018-02-20 ENCOUNTER — OFFICE VISIT (OUTPATIENT)
Dept: OPHTHALMOLOGY | Facility: CLINIC | Age: 83
End: 2018-02-20
Payer: MEDICARE

## 2018-02-20 DIAGNOSIS — H43.813 POSTERIOR VITREOUS DETACHMENT, BOTH EYES: ICD-10-CM

## 2018-02-20 DIAGNOSIS — H35.3231 EXUDATIVE AGE-RELATED MACULAR DEGENERATION, BILATERAL, WITH ACTIVE CHOROIDAL NEOVASCULARIZATION: Primary | ICD-10-CM

## 2018-02-20 PROCEDURE — 92226 PR SPECIAL EYE EXAM, SUBSEQUENT: CPT | Mod: 50,S$PBB,, | Performed by: OPHTHALMOLOGY

## 2018-02-20 PROCEDURE — 92134 CPTRZ OPH DX IMG PST SGM RTA: CPT | Mod: PBBFAC | Performed by: OPHTHALMOLOGY

## 2018-02-20 PROCEDURE — 99212 OFFICE O/P EST SF 10 MIN: CPT | Mod: PBBFAC | Performed by: OPHTHALMOLOGY

## 2018-02-20 PROCEDURE — 92014 COMPRE OPH EXAM EST PT 1/>: CPT | Mod: 25,S$PBB,, | Performed by: OPHTHALMOLOGY

## 2018-02-20 PROCEDURE — 99999 PR PBB SHADOW E&M-EST. PATIENT-LVL II: CPT | Mod: PBBFAC,,, | Performed by: OPHTHALMOLOGY

## 2018-02-20 PROCEDURE — 92226 PR SPECIAL EYE EXAM, SUBSEQUENT: CPT | Mod: 50,PBBFAC | Performed by: OPHTHALMOLOGY

## 2018-02-20 NOTE — PROGRESS NOTES
HPI     6 mo / OCT  DLS 08/22/2017 Dr. Osman     Pt sts both dist and reading vision decline in OD since last visit. Denies   pain.   (-)Flashes (-)Floaters  (+)Photophobia  (+)Glare    Meds: Systane ou prn     HPI     Eye Problem    Additional comments: 3 mos check           Comments   DLS 5/19/17- Vision OD has gotten worse both distance and near since last   visit      OCT - [facility] PED's with no SRF OD   , OS - stable no SRF    Previous FA - OD - Late CME leakage  OS - minimal late PED leakage    1. AMD OU  - History of drusenoid PED's  - 4/15 - developed foveal SRF OU  - OCT today stable  - S/p Avastin OD x 10, OS x 9     - Also with subfoveal RPE loss - may not improve with injections  - AREDS/AG  - CME component OD - DC travatan (06/2015)     Stable today, continue observation    2. PCIOL OU    3. Floaters OU    4. POAG   - Off all drops per Dr. Leger, continue f/u as scheduled   - S/p Ahmed valve OD on 3/02/16    5. WILLIAM   - AT 3-4x/day      6 months OCT

## 2018-03-16 ENCOUNTER — OFFICE VISIT (OUTPATIENT)
Dept: FAMILY MEDICINE | Facility: CLINIC | Age: 83
End: 2018-03-16
Payer: MEDICARE

## 2018-03-16 VITALS
TEMPERATURE: 98 F | HEIGHT: 63 IN | SYSTOLIC BLOOD PRESSURE: 120 MMHG | WEIGHT: 153.88 LBS | DIASTOLIC BLOOD PRESSURE: 78 MMHG | BODY MASS INDEX: 27.27 KG/M2 | OXYGEN SATURATION: 98 % | HEART RATE: 58 BPM

## 2018-03-16 DIAGNOSIS — F03.90 DEMENTIA WITHOUT BEHAVIORAL DISTURBANCE, UNSPECIFIED DEMENTIA TYPE: ICD-10-CM

## 2018-03-16 DIAGNOSIS — M54.50 LOW BACK PAIN WITHOUT SCIATICA, UNSPECIFIED BACK PAIN LATERALITY, UNSPECIFIED CHRONICITY: ICD-10-CM

## 2018-03-16 DIAGNOSIS — E78.5 HYPERLIPIDEMIA, UNSPECIFIED HYPERLIPIDEMIA TYPE: Primary | ICD-10-CM

## 2018-03-16 DIAGNOSIS — I95.9 HYPOTENSION, UNSPECIFIED HYPOTENSION TYPE: ICD-10-CM

## 2018-03-16 PROCEDURE — 99213 OFFICE O/P EST LOW 20 MIN: CPT | Mod: PBBFAC,PO | Performed by: FAMILY MEDICINE

## 2018-03-16 PROCEDURE — 99214 OFFICE O/P EST MOD 30 MIN: CPT | Mod: S$PBB,,, | Performed by: FAMILY MEDICINE

## 2018-03-16 PROCEDURE — 99999 PR PBB SHADOW E&M-EST. PATIENT-LVL III: CPT | Mod: PBBFAC,,, | Performed by: FAMILY MEDICINE

## 2018-03-16 RX ORDER — DONEPEZIL HYDROCHLORIDE 10 MG/1
10 TABLET, FILM COATED ORAL NIGHTLY
Qty: 30 TABLET | Refills: 11 | Status: SHIPPED | OUTPATIENT
Start: 2018-03-16 | End: 2018-08-29 | Stop reason: SDUPTHER

## 2018-03-16 RX ORDER — MIDODRINE HYDROCHLORIDE 10 MG/1
10 TABLET ORAL 3 TIMES DAILY
Qty: 270 TABLET | Refills: 3 | Status: SHIPPED | OUTPATIENT
Start: 2018-03-16 | End: 2019-06-20 | Stop reason: SDUPTHER

## 2018-03-16 RX ORDER — MEMANTINE HYDROCHLORIDE 28 MG/1
1 CAPSULE, EXTENDED RELEASE ORAL DAILY
Qty: 30 EACH | Refills: 11 | Status: SHIPPED | OUTPATIENT
Start: 2018-03-16 | End: 2018-09-18 | Stop reason: SDUPTHER

## 2018-03-16 NOTE — PROGRESS NOTES
Subjective:       Patient ID: Elda Cui     Chief Complaint: Annual Exam      Bety Cui is a 83 y.o. female.here for follow up HLD, dementia and hypotension.  Cholesterol severely uncontrolled.  Trying to monitor fat intake in diet.  BP stable.  No symptoms of hypotension.  Memory stable.  No cardiovascular complaints.    Review of patient's allergies indicates:   Allergen Reactions    Doxycycline Nausea And Vomiting       Current Outpatient Prescriptions:     donepezil (ARICEPT) 10 MG tablet, Take 1 tablet (10 mg total) by mouth every evening., Disp: 30 tablet, Rfl: 11    glucosamine-chondroitin 500-400 mg tablet, Take 1 tablet by mouth 3 (three) times daily., Disp: , Rfl:     memantine (NAMENDA XR) 28 mg CSpX, Take 1 capsule (28 mg total) by mouth once daily., Disp: 30 each, Rfl: 11    midodrine (PROAMATINE) 10 MG tablet, Take 1 tablet (10 mg total) by mouth 3 (three) times daily., Disp: 270 tablet, Rfl: 3    azelastine (ASTELIN) 137 mcg nasal spray, 1-2 sprays (137-274 mcg total) by Nasal route 2 (two) times daily as needed for Rhinitis., Disp: 30 mL, Rfl: 5    Past Medical History:   Diagnosis Date    Allergy     Bilateral nonexudative age-related macular degeneration 5/2/2014    Blood transfusion     Glaucoma     Hyperlipidemia     Joint pain     Memory loss     Pseudophakia of both eyes 5/20/2014    SQUAMOUS CELL CARCINOMA 6/13/13    in situ, excised from L cheek    Syncope 1/7/2014    Ulcerative colitis      Review of Systems   Respiratory: Negative.    Cardiovascular: Negative.    Gastrointestinal: Negative.    Musculoskeletal: Positive for back pain.   Psychiatric/Behavioral: Negative for behavioral problems and confusion.       Objective:      Physical Exam   Constitutional: She is oriented to person, place, and time. She appears well-developed and well-nourished.   HENT:   Head: Normocephalic.   Neck: Normal range of motion. Neck supple. No thyromegaly  present.   Cardiovascular: Normal rate, regular rhythm and normal heart sounds.    No murmur heard.  Pulmonary/Chest: Effort normal and breath sounds normal. No respiratory distress. She has no wheezes. She has no rales.   Abdominal: Soft. Bowel sounds are normal. She exhibits no distension and no mass. There is no tenderness.   Musculoskeletal: She exhibits no edema.   Lymphadenopathy:     She has no cervical adenopathy.   Neurological: She is alert and oriented to person, place, and time.   Skin: Skin is warm and dry. No rash noted.   Psychiatric: She has a normal mood and affect.       Assessment:       1. Hyperlipidemia, unspecified hyperlipidemia type    2. Dementia without behavioral disturbance, unspecified dementia type    3. Low back pain without sciatica, unspecified back pain laterality, unspecified chronicity    4. Hypotension, unspecified hypotension type        Plan:       Elda was seen today for annual exam.    Diagnoses and all orders for this visit:    Hyperlipidemia, unspecified hyperlipidemia type  Some improvement with diet.  -     Lipid panel; Future  -     Comprehensive metabolic panel; Future  -     CBC auto differential; Future    Dementia without behavioral disturbance, unspecified dementia type  -     donepezil (ARICEPT) 10 MG tablet; Take 1 tablet (10 mg total) by mouth every evening.  -     memantine (NAMENDA XR) 28 mg CSpX; Take 1 capsule (28 mg total) by mouth once daily.    Low back pain without sciatica, unspecified back pain laterality, unspecified chronicity  Continue naprosyn    Hypotension, unspecified hypotension type  -     midodrine (PROAMATINE) 10 MG tablet; Take 1 tablet (10 mg total) by mouth 3 (three) times daily.

## 2018-03-19 ENCOUNTER — LAB VISIT (OUTPATIENT)
Dept: LAB | Facility: HOSPITAL | Age: 83
End: 2018-03-19
Attending: FAMILY MEDICINE
Payer: MEDICARE

## 2018-03-19 DIAGNOSIS — E78.5 HYPERLIPIDEMIA, UNSPECIFIED HYPERLIPIDEMIA TYPE: ICD-10-CM

## 2018-03-19 LAB
ALBUMIN SERPL BCP-MCNC: 4.2 G/DL
ALP SERPL-CCNC: 72 U/L
ALT SERPL W/O P-5'-P-CCNC: 12 U/L
ANION GAP SERPL CALC-SCNC: 8 MMOL/L
AST SERPL-CCNC: 18 U/L
BASOPHILS # BLD AUTO: 0.04 K/UL
BASOPHILS NFR BLD: 0.8 %
BILIRUB SERPL-MCNC: 0.4 MG/DL
BUN SERPL-MCNC: 15 MG/DL
CALCIUM SERPL-MCNC: 9.7 MG/DL
CHLORIDE SERPL-SCNC: 102 MMOL/L
CHOLEST SERPL-MCNC: 304 MG/DL
CHOLEST/HDLC SERPL: 5.7 {RATIO}
CO2 SERPL-SCNC: 27 MMOL/L
CREAT SERPL-MCNC: 0.9 MG/DL
DIFFERENTIAL METHOD: NORMAL
EOSINOPHIL # BLD AUTO: 0.2 K/UL
EOSINOPHIL NFR BLD: 4.7 %
ERYTHROCYTE [DISTWIDTH] IN BLOOD BY AUTOMATED COUNT: 12.4 %
EST. GFR  (AFRICAN AMERICAN): >60 ML/MIN/1.73 M^2
EST. GFR  (NON AFRICAN AMERICAN): 59.3 ML/MIN/1.73 M^2
GLUCOSE SERPL-MCNC: 97 MG/DL
HCT VFR BLD AUTO: 41.3 %
HDLC SERPL-MCNC: 53 MG/DL
HDLC SERPL: 17.4 %
HGB BLD-MCNC: 13.6 G/DL
IMM GRANULOCYTES # BLD AUTO: 0.01 K/UL
IMM GRANULOCYTES NFR BLD AUTO: 0.2 %
LDLC SERPL CALC-MCNC: 189.4 MG/DL
LYMPHOCYTES # BLD AUTO: 2.3 K/UL
LYMPHOCYTES NFR BLD: 43.6 %
MCH RBC QN AUTO: 30.2 PG
MCHC RBC AUTO-ENTMCNC: 32.9 G/DL
MCV RBC AUTO: 92 FL
MONOCYTES # BLD AUTO: 0.4 K/UL
MONOCYTES NFR BLD: 7.6 %
NEUTROPHILS # BLD AUTO: 2.2 K/UL
NEUTROPHILS NFR BLD: 43.1 %
NONHDLC SERPL-MCNC: 251 MG/DL
NRBC BLD-RTO: 0 /100 WBC
PLATELET # BLD AUTO: 201 K/UL
PMV BLD AUTO: 10.5 FL
POTASSIUM SERPL-SCNC: 4.1 MMOL/L
PROT SERPL-MCNC: 7.4 G/DL
RBC # BLD AUTO: 4.5 M/UL
SODIUM SERPL-SCNC: 137 MMOL/L
TRIGL SERPL-MCNC: 308 MG/DL
WBC # BLD AUTO: 5.16 K/UL

## 2018-03-19 PROCEDURE — 85025 COMPLETE CBC W/AUTO DIFF WBC: CPT

## 2018-03-19 PROCEDURE — 80053 COMPREHEN METABOLIC PANEL: CPT

## 2018-03-19 PROCEDURE — 36415 COLL VENOUS BLD VENIPUNCTURE: CPT | Mod: PO

## 2018-03-19 PROCEDURE — 80061 LIPID PANEL: CPT

## 2018-03-29 ENCOUNTER — PATIENT MESSAGE (OUTPATIENT)
Dept: FAMILY MEDICINE | Facility: CLINIC | Age: 83
End: 2018-03-29

## 2018-04-20 ENCOUNTER — OFFICE VISIT (OUTPATIENT)
Dept: OPHTHALMOLOGY | Facility: CLINIC | Age: 83
End: 2018-04-20
Payer: MEDICARE

## 2018-04-20 ENCOUNTER — CLINICAL SUPPORT (OUTPATIENT)
Dept: OPHTHALMOLOGY | Facility: CLINIC | Age: 83
End: 2018-04-20
Payer: MEDICARE

## 2018-04-20 DIAGNOSIS — H02.409 PTOSIS OF EYELID, UNSPECIFIED LATERALITY: ICD-10-CM

## 2018-04-20 DIAGNOSIS — H43.813 POSTERIOR VITREOUS DETACHMENT, BOTH EYES: ICD-10-CM

## 2018-04-20 DIAGNOSIS — H35.3231 EXUDATIVE AGE-RELATED MACULAR DEGENERATION, BILATERAL, WITH ACTIVE CHOROIDAL NEOVASCULARIZATION: ICD-10-CM

## 2018-04-20 DIAGNOSIS — Z96.89 HISTORY OF GLAUCOMA TUBE SHUNT PROCEDURE: ICD-10-CM

## 2018-04-20 DIAGNOSIS — H21.561 AFFERENT PUPILLARY DEFECT, RIGHT: ICD-10-CM

## 2018-04-20 DIAGNOSIS — H40.1122 PRIMARY OPEN ANGLE GLAUCOMA OF LEFT EYE, MODERATE STAGE: ICD-10-CM

## 2018-04-20 DIAGNOSIS — H40.1413 PSEUDOEXFOLIATIVE GLAUCOMA, RIGHT EYE, SEVERE STAGE: ICD-10-CM

## 2018-04-20 DIAGNOSIS — H40.1413 PSEUDOEXFOLIATIVE GLAUCOMA, RIGHT EYE, SEVERE STAGE: Primary | ICD-10-CM

## 2018-04-20 DIAGNOSIS — Z96.1 PSEUDOPHAKIA OF BOTH EYES: ICD-10-CM

## 2018-04-20 DIAGNOSIS — M35.01 KCS (KERATOCONJUNCTIVITIS SICCA): ICD-10-CM

## 2018-04-20 PROCEDURE — 92014 COMPRE OPH EXAM EST PT 1/>: CPT | Mod: S$PBB,,, | Performed by: OPHTHALMOLOGY

## 2018-04-20 PROCEDURE — 92250 FUNDUS PHOTOGRAPHY W/I&R: CPT | Mod: PBBFAC | Performed by: OPHTHALMOLOGY

## 2018-04-20 PROCEDURE — 99999 PR PBB SHADOW E&M-EST. PATIENT-LVL II: CPT | Mod: PBBFAC,,, | Performed by: OPHTHALMOLOGY

## 2018-04-20 PROCEDURE — 92083 EXTENDED VISUAL FIELD XM: CPT | Mod: PBBFAC

## 2018-04-20 PROCEDURE — 99212 OFFICE O/P EST SF 10 MIN: CPT | Mod: PBBFAC,25 | Performed by: OPHTHALMOLOGY

## 2018-04-20 PROCEDURE — 92083 EXTENDED VISUAL FIELD XM: CPT | Mod: 26,S$PBB,, | Performed by: OPHTHALMOLOGY

## 2018-04-20 NOTE — PROGRESS NOTES
HPI     DLS: 12/11/17    Pt here for HVF review;    Meds: Systane prn ou      1. Exudative age-related macular degeneration, bilateral, with active   choroidal neovascularization   2. Pseudoexfoliative glaucoma, right eye, severe stage   3. Primary open angle glaucoma of left eye, moderate stage   4. Posterior vitreous detachment, both eyes   5. Ptosis of eyelid, unspecified laterality   6. Afferent pupillary defect, right   7. KCS (keratoconjunctiviits sicca)   8. History of glaucoma tube shunt procedure       Last edited by Briseida Mendoza on 4/20/2018  9:48 AM. (History)            Assessment /Plan     For exam results, see Encounter Report.    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 Open-angle glaucoma, moderate stage   PsEx - moderate stage OD // POAG - mild stage os    First HVF   2013   First photos   5/2014   Treatment / Drops started   combigan, travatan (started in [place] after [place])            Family history    + both parents        Glaucoma meds    Off all gtts  (use to use Combigan od , travatan od )         H/O adverse rxn to glaucoma drops    Stopped combigan 2/2 low BP // stopped PG's 2/2 wet armd // stopped dorzolamide 2/2 dermatitis (( allergic conj od  2/2 alphagan P and/or willard)         LASERS    SLT od 10/15/2015 - no response (IOP went up)         GLAUCOMA SURGERIES    Ahmed OD 3/2/2016        OTHER EYE SURGERIES    CE/IOL OU        CDR    0.9/0.4        Tbase    10-19/10 - 16          Tmax    19/16 (on gtts)            Ttarget    17/17             HVF    4 test 2013 to  5/2017 - SAD od // gen dep - new  Os                   SWITCH TO 24-2 STIM V OD - 1 TEST  5/2017 to 5/2017 - SAD         Gonio    +3-4 OU        CCT    519/528        OCT    1 test 2014 to 2014 - RNFL - dec I, bord S od // wnl os        HRT    4 test 2014 to 2016 - MR -  High std dev. od // dec. I os /// CDR xx od // 0.55 os         Disc photos    2014 - OIS    - Ttoday  16 /16  (( priviously 41/ 17 (off all gtts - 9/21/2015)) (( s/p ahmed os and no gtts od)   - Test today: HRT    2. Posterior vitreous detachment, both eyes      3. Bilateral exudative age-related macular degeneration s/p avastin ou  Loas avastin 11/24/2015 - ou - [facility]      4 Ptosis OS  S/P lid lift ou   Still with mild ptosis OS     5. +APD OD - mild     6. WILLIAM  Uses systane      7.  Pseudophakia OU  Done in [place] after [place]      S/P yag cap od        Plan:  H/O red / irritated eys   Resolved off all gtts and IOP is still ok post ahmed   IOP ok  od off all gtts - had ahmed od 3/2/2016 - IOP ok post op   Intolerant to all glaucoma gtts       PsExGl od - severe / POAG os - mild  - very assymetric c/d OU- mildly thin corneas- open on gonio- +family history  Patient was started on glaucoma treatment after [place]- went to [place] after [place]- now back here  Was seen by Dr. Blake ([facility] [place])  and Dr. Ontiveros and sent for further glaucoma eval  HVF show SAD OU however appears to have worsened OS- patient with significant ptosis OS- could be lid artifact?-   Optic disc OS does not appear to be as bad as HVF shows- looks very healthy    ++ APD od - old    Seeing [facility] - PED - s/p avastin x 2 ou - keep F/u appt for ?  More avastin   VA os improving  + armd - see OCT done 4/7/2015 // PED w/ SR fluid ou   S/P last avastin 1/26/2016 (VA was poor od at 20/200E@6' and much better os at that visit)       S/P  ahmed implant od 3/2/2016   Intol to all gtts   Off PG's per [facility]  Intol to dorzolamide - dermatitis  Intol to alphagan - marked hyperemia  Intol to willard - hypermia   -intol to BB - low BP and HR     s/p GDD / ahmed od to try and control IOP od on 3/2/16 ++ PsExGl)   -Pressure doing well today at 16 OD    Currently on no drops      F/U 4 months - with iop CHECK - OTHER TEST ARE UP TO DATE     Saw Keyshawn 8/2016 - no need for glasses

## 2018-05-08 ENCOUNTER — OFFICE VISIT (OUTPATIENT)
Dept: DERMATOLOGY | Facility: CLINIC | Age: 83
End: 2018-05-08
Payer: MEDICARE

## 2018-05-08 DIAGNOSIS — L82.1 SEBORRHEIC KERATOSES: ICD-10-CM

## 2018-05-08 DIAGNOSIS — D48.5 NEOPLASM OF UNCERTAIN BEHAVIOR OF SKIN: Primary | ICD-10-CM

## 2018-05-08 PROCEDURE — 99212 OFFICE O/P EST SF 10 MIN: CPT | Mod: PBBFAC,25 | Performed by: NURSE PRACTITIONER

## 2018-05-08 PROCEDURE — 99999 PR PBB SHADOW E&M-EST. PATIENT-LVL II: CPT | Mod: PBBFAC,,, | Performed by: NURSE PRACTITIONER

## 2018-05-08 PROCEDURE — 99213 OFFICE O/P EST LOW 20 MIN: CPT | Mod: 25,S$PBB,, | Performed by: NURSE PRACTITIONER

## 2018-05-08 PROCEDURE — 11100 PR BIOPSY OF SKIN LESION: CPT | Mod: PBBFAC | Performed by: NURSE PRACTITIONER

## 2018-05-08 PROCEDURE — 11100 PR BIOPSY OF SKIN LESION: CPT | Mod: S$PBB,,, | Performed by: NURSE PRACTITIONER

## 2018-05-08 PROCEDURE — 88305 TISSUE EXAM BY PATHOLOGIST: CPT | Performed by: PATHOLOGY

## 2018-05-08 NOTE — PATIENT INSTRUCTIONS
Shave Biopsy Wound Care    Your doctor has performed a shave biopsy today.  A band aid and vaseline ointment has been placed over the site.  This should remain in place for 24 hours.  It is recommended that you keep the area dry for the first 24 hours.  After 24 hours, you may remove the band aid and wash the area with warm soap and water and apply Vaseline jelly.  Many patients prefer to use Neosporin or Bacitracin ointment.  This is acceptable; however, know that you can develop an allergy to this medication even if you have used it safely for years.  It is important to keep the area moist.  Letting it dry out and get air slows healing time, and will worsen the scar.  Band aid is optional after first 24 hours.      If you notice increasing redness, tenderness, pain, or yellow drainage at the biopsy site, please notify your doctor.  These are signs of an infection.    If your biopsy site is bleeding, apply firm pressure for 15 minutes straight.  Repeat for another 15 minutes, if it is still bleeding.   If the surgical site continues to bleed, then please contact your doctor.      [facility] [place] [address]/ [phone] FAX/ [email or web address]              SEBORRHEIC KERATOSES        What causes seborrheic keratoses?    Seborrheic keratoses are harmless, common skin growths that first appear during adult life.  As time goes by, more growths appear.  Some persons have a very large number of them.  Seborrheic keratoses appear on both covered and uncovered parts of the body; they are not caused by sunlight.  The tendency to develop seborrheic keratoses is inherited.    Seborrheic keratoses are harmless and never become malignant.  They begin as slightly raised, light brown spots.  Gradually they thicken and take on a rough wartlike surface.  They slowly darken and may turn black.  These color changes are harmless.  Seborrheic keratoses are superficial and look as if they were stuck on  the skin.  Persons who have had several seborrheic keratoses can usually recognize this type of benign growth.  However, if you are concerned or unsure about any growth, consult me.    Treatment    Seborrheic keratoses can easily be removed in the office.  The only reason for removing a seborrheic keratosis is your wish to get rid of it.

## 2018-05-08 NOTE — PROGRESS NOTES
Subjective:       Patient ID:  Elda uCi is a 83 y.o. female who presents for   Chief Complaint   Patient presents with    Skin Tags     Skin Tags  - Initial  Affected locations: back  Duration: 1 year  Signs / symptoms: irritated and inflamed  Severity: mild  Timing: constant  Aggravated by: friction  Relieving factors/Treatments tried: nothing  Improvement on treatment: no relief    +h/o SCC in 2013    Last skin cancer screening exam:   5/29/13 w/ [facility]    Review of Systems   Skin: Positive for activity-related sunscreen use. Negative for daily sunscreen use and wears hat.   Hematologic/Lymphatic: Does not bruise/bleed easily.        Objective:    Physical Exam   Constitutional: She appears well-developed and well-nourished. No distress.   Neurological: She is alert and oriented to person, place, and time. She is not disoriented.   Psychiatric: She has a normal mood and affect.   Skin:   Areas Examined (abnormalities noted in diagram):   Back Inspection Performed              Diagram Legend     Erythematous scaling macule/papule c/w actinic keratosis       Vascular papule c/w angioma      Pigmented verrucoid papule/plaque c/w seborrheic keratosis      Yellow umbilicated papule c/w sebaceous hyperplasia      Irregularly shaped tan macule c/w lentigo     1-2 mm smooth white papules consistent with Milia      Movable subcutaneous cyst with punctum c/w epidermal inclusion cyst      Subcutaneous movable cyst c/w pilar cyst      Firm pink to brown papule c/w dermatofibroma      Pedunculated fleshy papule(s) c/w skin tag(s)      Evenly pigmented macule c/w junctional nevus     Mildly variegated pigmented, slightly irregular-bordered macule c/w mildly atypical nevus      Flesh colored to evenly pigmented papule c/w intradermal nevus       Pink pearly papule/plaque c/w basal cell carcinoma      Erythematous hyperkeratotic cursted plaque c/w SCC      Surgical scar with no sign of skin cancer recurrence       Open and closed comedones      Inflammatory papules and pustules      Verrucoid papule consistent consistent with wart     Erythematous eczematous patches and plaques     Dystrophic onycholytic nail with subungual debris c/w onychomycosis     Umbilicated papule    Erythematous-base heme-crusted tan verrucoid plaque consistent with inflamed seborrheic keratosis     Erythematous Silvery Scaling Plaque c/w Psoriasis     See annotation          Assessment / Plan:      Pathology Orders:     Normal Orders This Visit    Tissue Specimen To Pathology, Dermatology     Questions:    Directional Terms:  Other(comment)    Clinical information:  r/o isk vs other    Specific Site:  left mid back        Neoplasm of uncertain behavior of skin  -     Tissue Specimen To Pathology, Dermatology    Shave biopsy procedure note:    Shave biopsy performed after verbal consent including risk of infection, scar, recurrence, need for additional treatment of site. Area prepped with alcohol, anesthetized with approximately 1.0cc of 1% lidocaine with epinephrine. Lesional tissue shaved with razor blade. Hemostasis achieved with application of aluminum chloride followed by hyfrecation. No complications. Dressing applied. Wound care explained.      Discussed likely Inflamed seborrheic keratosis, recommend Discussed with patient the importance of not manipulating skin lesions. Trauma often exacerbates condition. Trimming nails is recommended to avoid puncturing skin.       Seborrheic keratoses  These are benign inherited growths without a malignant potential. Reassurance given to patient. No treatment is necessary.       F/u with Dr. Leon for Skin cancer screening           Follow-up for F/u with Dr. Leon for Skin cancer screening.

## 2018-05-14 RX ORDER — MEMANTINE HYDROCHLORIDE 28 MG/1
CAPSULE, EXTENDED RELEASE ORAL
Qty: 30 CAPSULE | Refills: 0 | OUTPATIENT
Start: 2018-05-14

## 2018-05-16 ENCOUNTER — PES CALL (OUTPATIENT)
Dept: ADMINISTRATIVE | Facility: CLINIC | Age: 83
End: 2018-05-16

## 2018-08-10 RX ORDER — MIDODRINE HYDROCHLORIDE 10 MG/1
TABLET ORAL
Qty: 270 TABLET | Refills: 0 | Status: SHIPPED | OUTPATIENT
Start: 2018-08-10 | End: 2018-10-01 | Stop reason: SDUPTHER

## 2018-08-29 DIAGNOSIS — F03.90 DEMENTIA WITHOUT BEHAVIORAL DISTURBANCE, UNSPECIFIED DEMENTIA TYPE: ICD-10-CM

## 2018-08-29 RX ORDER — DONEPEZIL HYDROCHLORIDE 10 MG/1
10 TABLET, FILM COATED ORAL NIGHTLY
Qty: 30 TABLET | Refills: 11 | Status: SHIPPED | OUTPATIENT
Start: 2018-08-29 | End: 2019-09-06 | Stop reason: SDUPTHER

## 2018-09-18 DIAGNOSIS — F03.90 DEMENTIA WITHOUT BEHAVIORAL DISTURBANCE, UNSPECIFIED DEMENTIA TYPE: ICD-10-CM

## 2018-09-18 RX ORDER — MEMANTINE HYDROCHLORIDE 28 MG/1
1 CAPSULE, EXTENDED RELEASE ORAL DAILY
Qty: 30 EACH | Refills: 11 | Status: SHIPPED | OUTPATIENT
Start: 2018-09-18 | End: 2019-03-21

## 2018-09-30 NOTE — PROGRESS NOTES
Assessment /Plan     For exam results, see Encounter Report.    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 Open-angle glaucoma, moderate stage   PsEx - moderate stage OD // POAG - mild stage os    First HVF   2013   First photos   5/2014   Treatment / Drops started   combigan, travatan (started in [place] after [place])            Family history    + both parents        Glaucoma meds    Off all gtts  (use to use Combigan od , travatan od )         H/O adverse rxn to glaucoma drops    Stopped combigan 2/2 low BP // stopped PG's 2/2 wet armd // stopped dorzolamide 2/2 dermatitis (( allergic conj od 2/2 alphagan P and/or willard)         LASERS    SLT od 10/15/2015 - no response (IOP went up)         GLAUCOMA SURGERIES    Ahmed OD 3/2/2016        OTHER EYE SURGERIES    CE/IOL OU        CDR    0.9/0.4        Tbase    10-19/10 - 16          Tmax    19/16 (on gtts)            Ttarget    17/17             HVF    4 test 2013 to  5/2017 - SAD od // gen dep - new  Os                   SWITCH TO 24-2 STIM V OD - 1 TEST  5/2017 to 5/2017 - SAD         Gonio    +3-4 OU        CCT    519/528        OCT    1 test 2014 to 2014 - RNFL - dec I, bord S od // wnl os        HRT    4 test  2014 to 2016 - MR -  High std dev. od // dec. I os /// CDR xx od // 0.55 os         Disc photos    2014 - OIS    - Ttoday  14 /16  (( priviously 41/ 17 (off all gtts - 9/21/2015)) (( s/p ahmed os and no gtts od)   - Test today: gonio    2. Posterior vitreous detachment, both eyes      3. Bilateral exudative age-related macular degeneration s/p avastin ou  Loas avastin 11/24/2015 - ou - [facility]      4 Ptosis OS  S/P lid lift ou   Still with mild ptosis OS     5. +APD OD - mild     6. WILLIAM  Uses systane      7.  Pseudophakia OU  Done in [place] after [place]      S/P yag cap od        Plan:  H/O red / irritated eys   Resolved off all gtts and IOP is still ok post ahmed   IOP ok  od off all gtts - had ahmed od 3/2/2016 - IOP ok post op   Intolerant to all glaucoma gtts       PsExGl od - severe / POAG os - mild  - very assymetric c/d OU- mildly thin corneas- open on gonio- +family history  Patient was started on glaucoma treatment after [place]- went to [place] after [place]- now back here  Was seen by Dr. Blake ([facility])  and Dr. Ontiveros and sent for further glaucoma eval  HVF show SAD OU however appears to have worsened OS- patient with significant ptosis OS- could be lid artifact?-   Optic disc OS does not appear to be as bad as HVF shows- looks very healthy    ++ APD od - old    Seeing [facility] - PED - s/p avastin x 2 ou - keep F/u appt for ? More avastin   VA os improving  + armd - see OCT done 4/7/2015 // PED w/ SR fluid ou   S/P last avastin 1/26/2016 (VA was poor od at 20/200E@6' and much better os at that visit)       S/P  ahmed implant od 3/2/2016   Intol to all gtts   Off PG's per [place]  Intol to dorzolamide - dermatitis  Intol to alphagan - marked hyperemia  Intol to willard - hypermia   -intol to BB - low BP and HR     s/p GDD / ahmed od to try and control IOP od on 3/2/16 ++ PsExGl)   -Pressure doing well today at 16 OD    Currently on no drops      F/U 4 months - with iop CHECK - HRT     Saw  [facility] 8/2016 - no need for glasses

## 2018-10-01 ENCOUNTER — OFFICE VISIT (OUTPATIENT)
Dept: OPHTHALMOLOGY | Facility: CLINIC | Age: 83
End: 2018-10-01
Payer: MEDICARE

## 2018-10-01 DIAGNOSIS — H35.3231 EXUDATIVE AGE-RELATED MACULAR DEGENERATION, BILATERAL, WITH ACTIVE CHOROIDAL NEOVASCULARIZATION: ICD-10-CM

## 2018-10-01 DIAGNOSIS — M35.01 KCS (KERATOCONJUNCTIVITIS SICCA): ICD-10-CM

## 2018-10-01 DIAGNOSIS — Z96.89 HISTORY OF GLAUCOMA TUBE SHUNT PROCEDURE: ICD-10-CM

## 2018-10-01 DIAGNOSIS — H40.1413 PSEUDOEXFOLIATIVE GLAUCOMA, RIGHT EYE, SEVERE STAGE: Primary | ICD-10-CM

## 2018-10-01 DIAGNOSIS — H40.1122 PRIMARY OPEN ANGLE GLAUCOMA OF LEFT EYE, MODERATE STAGE: ICD-10-CM

## 2018-10-01 DIAGNOSIS — Z96.1 PSEUDOPHAKIA OF BOTH EYES: ICD-10-CM

## 2018-10-01 DIAGNOSIS — H43.813 POSTERIOR VITREOUS DETACHMENT, BOTH EYES: ICD-10-CM

## 2018-10-01 DIAGNOSIS — H02.409 PTOSIS OF EYELID, UNSPECIFIED LATERALITY: ICD-10-CM

## 2018-10-01 DIAGNOSIS — H21.561 AFFERENT PUPILLARY DEFECT, RIGHT: ICD-10-CM

## 2018-10-01 PROCEDURE — 92012 INTRM OPH EXAM EST PATIENT: CPT | Mod: S$PBB,,, | Performed by: OPHTHALMOLOGY

## 2018-10-01 PROCEDURE — 99212 OFFICE O/P EST SF 10 MIN: CPT | Mod: PBBFAC | Performed by: OPHTHALMOLOGY

## 2018-10-01 PROCEDURE — 99999 PR PBB SHADOW E&M-EST. PATIENT-LVL II: CPT | Mod: PBBFAC,,, | Performed by: OPHTHALMOLOGY

## 2018-10-02 ENCOUNTER — PES CALL (OUTPATIENT)
Dept: ADMINISTRATIVE | Facility: CLINIC | Age: 83
End: 2018-10-02

## 2018-10-02 ENCOUNTER — TELEPHONE (OUTPATIENT)
Dept: FAMILY MEDICINE | Facility: CLINIC | Age: 83
End: 2018-10-02

## 2018-10-02 NOTE — TELEPHONE ENCOUNTER
----- Message from Brook Camacho sent at 10/2/2018 11:16 AM CDT -----  Contact: Self  Pt is calling to schedule her annual wellness visit. Please call pt at [phone].

## 2018-10-03 ENCOUNTER — LAB VISIT (OUTPATIENT)
Dept: LAB | Facility: HOSPITAL | Age: 83
End: 2018-10-03
Attending: FAMILY MEDICINE
Payer: MEDICARE

## 2018-10-03 ENCOUNTER — PES CALL (OUTPATIENT)
Dept: ADMINISTRATIVE | Facility: CLINIC | Age: 83
End: 2018-10-03

## 2018-10-03 ENCOUNTER — OFFICE VISIT (OUTPATIENT)
Dept: FAMILY MEDICINE | Facility: CLINIC | Age: 83
End: 2018-10-03
Payer: MEDICARE

## 2018-10-03 VITALS
BODY MASS INDEX: 26.13 KG/M2 | HEIGHT: 63 IN | OXYGEN SATURATION: 96 % | WEIGHT: 147.5 LBS | SYSTOLIC BLOOD PRESSURE: 120 MMHG | RESPIRATION RATE: 20 BRPM | DIASTOLIC BLOOD PRESSURE: 70 MMHG | HEART RATE: 74 BPM | TEMPERATURE: 98 F

## 2018-10-03 DIAGNOSIS — Z23 NEED FOR PNEUMOCOCCAL VACCINATION: ICD-10-CM

## 2018-10-03 DIAGNOSIS — R42 LIGHTHEADED: ICD-10-CM

## 2018-10-03 DIAGNOSIS — I95.0 IDIOPATHIC HYPOTENSION: ICD-10-CM

## 2018-10-03 DIAGNOSIS — Z23 NEED FOR INFLUENZA VACCINATION: Primary | ICD-10-CM

## 2018-10-03 LAB
ALBUMIN SERPL BCP-MCNC: 4.2 G/DL
ALP SERPL-CCNC: 71 U/L
ALT SERPL W/O P-5'-P-CCNC: 12 U/L
ANION GAP SERPL CALC-SCNC: 9 MMOL/L
AST SERPL-CCNC: 20 U/L
BASOPHILS # BLD AUTO: 0.05 K/UL
BASOPHILS NFR BLD: 0.6 %
BILIRUB SERPL-MCNC: 0.4 MG/DL
BUN SERPL-MCNC: 20 MG/DL
CALCIUM SERPL-MCNC: 10 MG/DL
CHLORIDE SERPL-SCNC: 99 MMOL/L
CO2 SERPL-SCNC: 27 MMOL/L
CREAT SERPL-MCNC: 0.8 MG/DL
DIFFERENTIAL METHOD: NORMAL
EOSINOPHIL # BLD AUTO: 0.1 K/UL
EOSINOPHIL NFR BLD: 0.8 %
ERYTHROCYTE [DISTWIDTH] IN BLOOD BY AUTOMATED COUNT: 12.5 %
EST. GFR  (AFRICAN AMERICAN): >60 ML/MIN/1.73 M^2
EST. GFR  (NON AFRICAN AMERICAN): >60 ML/MIN/1.73 M^2
GLUCOSE SERPL-MCNC: 89 MG/DL
HCT VFR BLD AUTO: 40.9 %
HGB BLD-MCNC: 13.2 G/DL
IMM GRANULOCYTES # BLD AUTO: 0.03 K/UL
IMM GRANULOCYTES NFR BLD AUTO: 0.4 %
LYMPHOCYTES # BLD AUTO: 2.3 K/UL
LYMPHOCYTES NFR BLD: 27 %
MCH RBC QN AUTO: 30.6 PG
MCHC RBC AUTO-ENTMCNC: 32.3 G/DL
MCV RBC AUTO: 95 FL
MONOCYTES # BLD AUTO: 0.6 K/UL
MONOCYTES NFR BLD: 7 %
NEUTROPHILS # BLD AUTO: 5.5 K/UL
NEUTROPHILS NFR BLD: 64.2 %
NRBC BLD-RTO: 0 /100 WBC
PLATELET # BLD AUTO: 274 K/UL
PMV BLD AUTO: 9.9 FL
POTASSIUM SERPL-SCNC: 4.3 MMOL/L
PROT SERPL-MCNC: 7.4 G/DL
RBC # BLD AUTO: 4.32 M/UL
SODIUM SERPL-SCNC: 135 MMOL/L
WBC # BLD AUTO: 8.53 K/UL

## 2018-10-03 PROCEDURE — 99214 OFFICE O/P EST MOD 30 MIN: CPT | Mod: S$PBB,,, | Performed by: FAMILY MEDICINE

## 2018-10-03 PROCEDURE — 93010 ELECTROCARDIOGRAM REPORT: CPT | Mod: S$PBB,,, | Performed by: INTERNAL MEDICINE

## 2018-10-03 PROCEDURE — 80053 COMPREHEN METABOLIC PANEL: CPT

## 2018-10-03 PROCEDURE — 90732 PPSV23 VACC 2 YRS+ SUBQ/IM: CPT | Mod: PBBFAC,PO

## 2018-10-03 PROCEDURE — 99214 OFFICE O/P EST MOD 30 MIN: CPT | Mod: PBBFAC,25,PO | Performed by: FAMILY MEDICINE

## 2018-10-03 PROCEDURE — 36415 COLL VENOUS BLD VENIPUNCTURE: CPT | Mod: PO

## 2018-10-03 PROCEDURE — 93005 ELECTROCARDIOGRAM TRACING: CPT | Mod: PBBFAC,PO | Performed by: INTERNAL MEDICINE

## 2018-10-03 PROCEDURE — 90662 IIV NO PRSV INCREASED AG IM: CPT | Mod: PBBFAC,PO

## 2018-10-03 PROCEDURE — 85025 COMPLETE CBC W/AUTO DIFF WBC: CPT

## 2018-10-03 PROCEDURE — 99999 PR PBB SHADOW E&M-EST. PATIENT-LVL IV: CPT | Mod: PBBFAC,,, | Performed by: FAMILY MEDICINE

## 2018-10-03 NOTE — PROGRESS NOTES
Pt tolerated flu vaccine and pneumococcal 23 vaccine to left deltoid without difficulty; no adverse reaction noted; VIS given

## 2018-10-03 NOTE — PROGRESS NOTES
Subjective:       Patient ID: Elda Cui     Chief Complaint: Annual Exam      Bety Cui is a 83 y.o. female. Presents with 2 episodes of feeling faint while in grocery store over past 2 weeks.  No other associated symptoms.  No LOC. Symptoms lasted 1 minute.  No cardiac symptoms or diaphoresis.  No visual changes.  Chronic hypotension, takes midodrine.  Reports good fluid intake.    Review of patient's allergies indicates:   Allergen Reactions    Doxycycline Nausea And Vomiting       Current Outpatient Medications:     donepezil (ARICEPT) 10 MG tablet, Take 1 tablet (10 mg total) by mouth every evening., Disp: 30 tablet, Rfl: 11    memantine (NAMENDA XR) 28 mg CSpX, Take 1 capsule (28 mg total) by mouth once daily., Disp: 30 each, Rfl: 11    midodrine (PROAMATINE) 10 MG tablet, Take 1 tablet (10 mg total) by mouth 3 (three) times daily., Disp: 270 tablet, Rfl: 3    glucosamine-chondroitin 500-400 mg tablet, Take 1 tablet by mouth 3 (three) times daily., Disp: , Rfl:     Past Medical History:   Diagnosis Date    Allergy     Bilateral nonexudative age-related macular degeneration 5/2/2014    Blood transfusion     Glaucoma     Hyperlipidemia     Joint pain     Memory loss     Pseudophakia of both eyes 5/20/2014    Squamous Cell Carcinoma 6/13/13    in situ, excised from L cheek    Syncope 1/7/2014    Ulcerative colitis      Review of Systems   Constitutional: Negative for appetite change, chills, diaphoresis, fatigue and fever.   HENT: Negative for sinus pressure and trouble swallowing.    Eyes: Negative for pain and visual disturbance.   Respiratory: Negative for cough, chest tightness, shortness of breath and wheezing.    Cardiovascular: Negative for chest pain, palpitations and leg swelling.   Gastrointestinal: Negative for abdominal pain, blood in stool, constipation, diarrhea, nausea and vomiting.   Endocrine: Negative for polydipsia and polyphagia.    Genitourinary: Negative for difficulty urinating, dysuria, hematuria, menstrual problem, pelvic pain and vaginal discharge.   Musculoskeletal: Negative for back pain, joint swelling and neck pain.   Skin: Negative for color change and rash.   Allergic/Immunologic: Negative for environmental allergies and food allergies.   Neurological: Negative for dizziness, numbness and headaches.   Hematological: Negative for adenopathy. Does not bruise/bleed easily.   Psychiatric/Behavioral: Negative for dysphoric mood and sleep disturbance. The patient is not nervous/anxious.        Objective:    /80 sitting, /84  Physical Exam   Constitutional: She is oriented to person, place, and time. She appears well-developed and well-nourished.   HENT:   Head: Normocephalic.   Neck: Normal range of motion. Neck supple. No thyromegaly present.   Cardiovascular: Normal rate, regular rhythm and normal heart sounds.   No murmur heard.  Pulmonary/Chest: Effort normal and breath sounds normal. No respiratory distress. She has no wheezes. She has no rales.   Abdominal: Soft. Bowel sounds are normal. She exhibits no distension and no mass. There is no tenderness.   Musculoskeletal: She exhibits no edema.   Lymphadenopathy:     She has no cervical adenopathy.   Neurological: She is alert and oriented to person, place, and time.   Skin: Skin is warm and dry. No rash noted.   Psychiatric: She has a normal mood and affect.     EKG:  NSR  VR 68  Assessment:       1. Need for influenza vaccination    2. Need for pneumococcal vaccination    3. Idiopathic hypotension    4. Lightheaded        Plan:       Elda was seen today for annual exam.    Diagnoses and all orders for this visit:    Need for influenza vaccination  -     Influenza - High Dose (65+) (PF) (IM)    Need for pneumococcal vaccination  -     Pneumococcal Polysaccharide Vaccine (23 Valent) (SQ/IM)    Idiopathic hypotension  -     CBC auto differential; Future  -      Comprehensive metabolic panel; Future    Lightheaded  -     EKG 12-lead

## 2018-12-07 ENCOUNTER — OFFICE VISIT (OUTPATIENT)
Dept: OPHTHALMOLOGY | Facility: CLINIC | Age: 83
End: 2018-12-07
Payer: MEDICARE

## 2018-12-07 DIAGNOSIS — H43.813 POSTERIOR VITREOUS DETACHMENT, BOTH EYES: ICD-10-CM

## 2018-12-07 DIAGNOSIS — H35.3231 EXUDATIVE AGE-RELATED MACULAR DEGENERATION, BILATERAL, WITH ACTIVE CHOROIDAL NEOVASCULARIZATION: Primary | ICD-10-CM

## 2018-12-07 DIAGNOSIS — H35.3132 NONEXUDATIVE AGE-RELATED MACULAR DEGENERATION, BILATERAL, INTERMEDIATE DRY STAGE: ICD-10-CM

## 2018-12-07 PROCEDURE — 92014 COMPRE OPH EXAM EST PT 1/>: CPT | Mod: S$PBB,,, | Performed by: OPHTHALMOLOGY

## 2018-12-07 PROCEDURE — 99213 OFFICE O/P EST LOW 20 MIN: CPT | Mod: PBBFAC | Performed by: OPHTHALMOLOGY

## 2018-12-07 PROCEDURE — 92134 CPTRZ OPH DX IMG PST SGM RTA: CPT | Mod: PBBFAC | Performed by: OPHTHALMOLOGY

## 2018-12-07 PROCEDURE — 92226 PR SPECIAL EYE EXAM, SUBSEQUENT: CPT | Mod: 50,PBBFAC | Performed by: OPHTHALMOLOGY

## 2018-12-07 PROCEDURE — 92226 PR SPECIAL EYE EXAM, SUBSEQUENT: CPT | Mod: 50,S$PBB,, | Performed by: OPHTHALMOLOGY

## 2018-12-07 PROCEDURE — 99999 PR PBB SHADOW E&M-EST. PATIENT-LVL III: CPT | Mod: PBBFAC,,, | Performed by: OPHTHALMOLOGY

## 2018-12-07 NOTE — PROGRESS NOTES
HPI     Pt here for 6 mo f/u.  Last seen on 2/20/18 with Dr. Macias.    Eye pain: 0/10.  Pt c/o of blurry vision od and photophobia ou, tearing od.  Pt denies double vision, dryness, black spots, floaters, and flashes.    Eye meds:  Ophthalmic lubricant 1-2x daily    Last edited by Kirsten Bautista on 12/7/2018  8:11 AM. (History)          OCT - Central PED's with no SRF OD   , OS - stable no SRF    Previous FA - OD - Late CME leakage  OS - minimal late PED leakage    1. AMD OU  - History of drusenoid PED's  - 4/15 - developed foveal SRF OU  - OCT today stable  - S/p Avastin OD x 10, OS x 9     - Also with subfoveal RPE loss - may not improve with injections  - AREDS/AG  - CME component OD - DC travatan (06/2015)     Stable today, continue observation    2. PCIOL OU    3. Floaters OU    4. POAG   - Off all drops per Dr. Leger, continue f/u as scheduled   - S/p Ahmed valve OD on 3/02/16    5. WILLIAM   - AT 3-4x/day      6 months OCT

## 2018-12-21 ENCOUNTER — PES CALL (OUTPATIENT)
Dept: ADMINISTRATIVE | Facility: CLINIC | Age: 83
End: 2018-12-21

## 2019-02-04 ENCOUNTER — OFFICE VISIT (OUTPATIENT)
Dept: OPHTHALMOLOGY | Facility: CLINIC | Age: 84
End: 2019-02-04
Payer: MEDICARE

## 2019-02-04 ENCOUNTER — CLINICAL SUPPORT (OUTPATIENT)
Dept: OPHTHALMOLOGY | Facility: CLINIC | Age: 84
End: 2019-02-04
Payer: MEDICARE

## 2019-02-04 DIAGNOSIS — H43.813 POSTERIOR VITREOUS DETACHMENT, BOTH EYES: ICD-10-CM

## 2019-02-04 DIAGNOSIS — H40.1413 PSEUDOEXFOLIATIVE GLAUCOMA, RIGHT EYE, SEVERE STAGE: ICD-10-CM

## 2019-02-04 DIAGNOSIS — H40.1413 PSEUDOEXFOLIATIVE GLAUCOMA, RIGHT EYE, SEVERE STAGE: Primary | ICD-10-CM

## 2019-02-04 DIAGNOSIS — H35.3132 NONEXUDATIVE AGE-RELATED MACULAR DEGENERATION, BILATERAL, INTERMEDIATE DRY STAGE: ICD-10-CM

## 2019-02-04 DIAGNOSIS — Z96.89 HISTORY OF GLAUCOMA TUBE SHUNT PROCEDURE: ICD-10-CM

## 2019-02-04 DIAGNOSIS — H40.1122 PRIMARY OPEN ANGLE GLAUCOMA OF LEFT EYE, MODERATE STAGE: ICD-10-CM

## 2019-02-04 DIAGNOSIS — Z96.1 PSEUDOPHAKIA OF BOTH EYES: ICD-10-CM

## 2019-02-04 DIAGNOSIS — H35.3231 EXUDATIVE AGE-RELATED MACULAR DEGENERATION, BILATERAL, WITH ACTIVE CHOROIDAL NEOVASCULARIZATION: ICD-10-CM

## 2019-02-04 DIAGNOSIS — H21.561 AFFERENT PUPILLARY DEFECT, RIGHT: ICD-10-CM

## 2019-02-04 PROCEDURE — 99999 PR PBB SHADOW E&M-EST. PATIENT-LVL II: ICD-10-PCS | Mod: PBBFAC,,, | Performed by: OPHTHALMOLOGY

## 2019-02-04 PROCEDURE — 92133 CPTRZD OPH DX IMG PST SGM ON: CPT | Mod: PBBFAC

## 2019-02-04 PROCEDURE — 92133 HEIDELBERG RETINA TOMOGRAPHY (HRT) - OU - BOTH EYES: ICD-10-PCS | Mod: 26,S$PBB,, | Performed by: OPHTHALMOLOGY

## 2019-02-04 PROCEDURE — 92012 PR EYE EXAM, EST PATIENT,INTERMED: ICD-10-PCS | Mod: S$PBB,,, | Performed by: OPHTHALMOLOGY

## 2019-02-04 PROCEDURE — 99999 PR PBB SHADOW E&M-EST. PATIENT-LVL I: ICD-10-PCS | Mod: PBBFAC,,,

## 2019-02-04 PROCEDURE — 99999 PR PBB SHADOW E&M-EST. PATIENT-LVL I: CPT | Mod: PBBFAC,,,

## 2019-02-04 PROCEDURE — 99999 PR PBB SHADOW E&M-EST. PATIENT-LVL II: CPT | Mod: PBBFAC,,, | Performed by: OPHTHALMOLOGY

## 2019-02-04 PROCEDURE — 92012 INTRM OPH EXAM EST PATIENT: CPT | Mod: S$PBB,,, | Performed by: OPHTHALMOLOGY

## 2019-02-04 PROCEDURE — 99211 OFF/OP EST MAY X REQ PHY/QHP: CPT | Mod: PBBFAC,27

## 2019-02-04 PROCEDURE — 92133 CPTRZD OPH DX IMG PST SGM ON: CPT | Mod: 26,S$PBB,, | Performed by: OPHTHALMOLOGY

## 2019-02-04 PROCEDURE — 99212 OFFICE O/P EST SF 10 MIN: CPT | Mod: PBBFAC,25 | Performed by: OPHTHALMOLOGY

## 2019-02-04 NOTE — PROGRESS NOTES
Assessment /Plan     For exam results, see Encounter Report.    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 Open-angle glaucoma, moderate stage   PsEx - moderate stage OD // POAG - mild stage os    First HVF   2013   First photos   5/2014   Treatment / Drops started   combigan, travatan (started in [place] after [place])            Family history    + both parents        Glaucoma meds    Off all gtts  (use to use Combigan od , travatan od )         H/O adverse rxn to glaucoma drops    Stopped combigan 2/2 low BP // stopped PG's 2/2 wet armd // stopped dorzolamide 2/2 dermatitis (( allergic conj od 2/2 alphagan P and/or willard)         LASERS    SLT od 10/15/2015 - no response (IOP went up)         GLAUCOMA SURGERIES    Ahmed OD 3/2/2016        OTHER EYE SURGERIES    CE/IOL OU        CDR    0.9/0.4        Tbase    10-19/10 - 16          Tmax    19/16 (on gtts)            Ttarget    17/17             HVF    4 test 2013 to  5/2017 - SAD od // gen dep - new  Os                   SWITCH TO 24-2 STIM V OD - 1 TEST  5/2017 to 5/2017 - SAD         Gonio    +3-4 OU        CCT    519/528        OCT    1 test 2014 to 2014 - RNFL - dec I, bord S od // wnl os        HRT    4  test 2014 to 2019 - MR -  Dec TS, NS bord G, T, TI od // bord TI/NI. I os /// CDR 0.745 od // 0.55 os         Disc photos    2014 - OIS    - Ttoday  14 /14   (( s/p ahmed os and no gtts od)   - Test today: HRT   2. Posterior vitreous detachment, both eyes      3. Bilateral exudative age-related macular degeneration s/p avastin ou  Loas avastin 11/24/2015 - ou - [place]      4 Ptosis OS  S/P lid lift ou   Still with mild ptosis OS     5. +APD OD - mild     6. WILLIAM  Uses systane      7.  Pseudophakia OU  Done in [place] after [place]      S/P yag cap od        Plan:  H/O red / irritated eys   Resolved off all gtts and IOP is still ok post ahmed   IOP ok  od off all gtts - had ahmed od 3/2/2016 - IOP ok post op   Intolerant to all glaucoma gtts       PsExGl od - severe / POAG os - mild  - very assymetric c/d OU- mildly thin corneas- open on gonio- +family history  Patient was started on glaucoma treatment after [place]- went to [place] after [place]- now back here  Was seen by Dr. Blake ([facility])  and Dr. Ontiveros and sent for further glaucoma eval  HVF show SAD OU however appears to have worsened OS- patient with significant ptosis OS- could be lid artifact?-   Optic disc OS does not appear to be as bad as HVF shows- looks very healthy    ++ APD od - old    Seeing [facility] - PED - s/p avastin x 2 ou - keep F/u appt for ? More avastin   VA os improving  + armd - see OCT done 4/7/2015 // PED w/ SR fluid ou   S/P last avastin 1/26/2016 (VA was poor od at 20/200E@6' and much better os at that visit)       S/P  ahmed implant od 3/2/2016   Intol to all gtts   Off PG's per [facility]  Intol to dorzolamide - dermatitis  Intol to alphagan - marked hyperemia  Intol to willard - hypermia   -intol to BB - low BP and HR     s/p GDD / ahmed od to try and control IOP od on 3/2/16 ++ PsExGl)   -Pressure doing well today at 16 OD    Currently on no drops      F/U 4 months - with iop CHECK - HVF/DFE/OCT    Saw [facility] 8/2016 - no  need for glasses

## 2019-03-04 ENCOUNTER — TELEPHONE (OUTPATIENT)
Dept: FAMILY MEDICINE | Facility: CLINIC | Age: 84
End: 2019-03-04

## 2019-03-04 DIAGNOSIS — M54.50 CHRONIC LOW BACK PAIN WITHOUT SCIATICA, UNSPECIFIED BACK PAIN LATERALITY: Primary | ICD-10-CM

## 2019-03-04 DIAGNOSIS — G89.29 CHRONIC LOW BACK PAIN WITHOUT SCIATICA, UNSPECIFIED BACK PAIN LATERALITY: Primary | ICD-10-CM

## 2019-03-04 NOTE — TELEPHONE ENCOUNTER
----- Message from Shannan uGtierrez sent at 3/4/2019  1:08 PM CST -----  Contact: Self/[phone]  Type:  Patient Requesting Referral    Who Called: Patient     Referral to What Specialty: Pain Management    Does the patient want the referral with a specific physician?: Dr. Anders Henry    [facility] the [facility]    Patient Requesting a Call Back?: Yes    Best Call Back Number: [phone]    Thank you.

## 2019-03-06 ENCOUNTER — PATIENT MESSAGE (OUTPATIENT)
Dept: FAMILY MEDICINE | Facility: CLINIC | Age: 84
End: 2019-03-06

## 2019-03-06 NOTE — TELEPHONE ENCOUNTER
Spoke with pt she informed me that she is having the same leg issues and haven't been able to get appointments with the other doctors you have placed referrals to. She would like to see Dr. Corona because he comes to [place].

## 2019-03-06 NOTE — TELEPHONE ENCOUNTER
Referral was pended in another encounter; pt does not want to see Dr Aguilar anymore, wants to see Dr Henry

## 2019-03-06 NOTE — TELEPHONE ENCOUNTER
----- Message from Burt Coffey sent at 3/6/2019 10:20 AM CST -----  Contact: Self/[phone]  The patient would like a referral to pain management to see DR. Henry.        Thank you

## 2019-03-11 ENCOUNTER — OFFICE VISIT (OUTPATIENT)
Dept: PAIN MEDICINE | Facility: CLINIC | Age: 84
End: 2019-03-11
Payer: MEDICARE

## 2019-03-11 VITALS
WEIGHT: 157.88 LBS | RESPIRATION RATE: 18 BRPM | DIASTOLIC BLOOD PRESSURE: 60 MMHG | BODY MASS INDEX: 27.97 KG/M2 | SYSTOLIC BLOOD PRESSURE: 132 MMHG | OXYGEN SATURATION: 99 % | HEIGHT: 63 IN | HEART RATE: 68 BPM

## 2019-03-11 DIAGNOSIS — M53.3 SACROILIAC JOINT PAIN: ICD-10-CM

## 2019-03-11 DIAGNOSIS — M48.061 SPINAL STENOSIS OF LUMBAR REGION, UNSPECIFIED WHETHER NEUROGENIC CLAUDICATION PRESENT: ICD-10-CM

## 2019-03-11 DIAGNOSIS — M47.897 OTHER OSTEOARTHRITIS OF SPINE, LUMBOSACRAL REGION: ICD-10-CM

## 2019-03-11 DIAGNOSIS — M51.36 DDD (DEGENERATIVE DISC DISEASE), LUMBAR: Primary | ICD-10-CM

## 2019-03-11 DIAGNOSIS — M47.816 LUMBAR SPONDYLOSIS: ICD-10-CM

## 2019-03-11 PROCEDURE — 99204 OFFICE O/P NEW MOD 45 MIN: CPT | Mod: S$PBB,,, | Performed by: PAIN MEDICINE

## 2019-03-11 PROCEDURE — 99213 OFFICE O/P EST LOW 20 MIN: CPT | Mod: PBBFAC,PN | Performed by: PAIN MEDICINE

## 2019-03-11 PROCEDURE — 99999 PR PBB SHADOW E&M-EST. PATIENT-LVL III: ICD-10-PCS | Mod: PBBFAC,,, | Performed by: PAIN MEDICINE

## 2019-03-11 PROCEDURE — 99204 PR OFFICE/OUTPT VISIT, NEW, LEVL IV, 45-59 MIN: ICD-10-PCS | Mod: S$PBB,,, | Performed by: PAIN MEDICINE

## 2019-03-11 PROCEDURE — 99999 PR PBB SHADOW E&M-EST. PATIENT-LVL III: CPT | Mod: PBBFAC,,, | Performed by: PAIN MEDICINE

## 2019-03-11 NOTE — LETTER
March 12, 2019      Cindi Hendricks MD  [address] [place] [address]           [facility]  [address] [facility]  [place] [address]  Phone: [phone]  Fax: [phone]          Patient: Elda Cui   MR Number: [id number]   YOB: 1935   Date of Visit: 3/11/2019       Dear Dr. Cindi Hendricks:    Thank you for referring Elda Cui to me for evaluation. Attached you will find relevant portions of my assessment and plan of care.    If you have questions, please do not hesitate to call me. I look forward to following Elda Cui along with you.    Sincerely,    Anders Henry Jr., MD    Enclosure  CC:  No Recipients    If you would like to receive this communication electronically, please contact [email or web address] or [phone] to request more information on [facility] access.    For providers and/or their staff who would like to refer a patient to [facility], please contact us through our one-stop-shop provider referral line, [facility], at [phone].    If you feel you have received this communication in error or would no longer like to receive these types of communications, please e-mail [email or web address]

## 2019-03-11 NOTE — PROGRESS NOTES
Subjective:     Patient ID: Elda Cui is a 84 y.o. female    Chief Complaint: Low-back Pain (patient experiences pain in the lower back pain ( facet)- patient experiences pain on Right side down to leg-patient states she had the burning of the nerves with Dr. Petersen- treatment w/ medication)      Referred by: Cindi Hendricks MD      HPI:    Initial Encounter (3/11/19):  Elda Cui is a 84 y.o. female who presents today with chronic right-sided low back pain. This pain has been present for about 3 years.  No specific inciting event or injury noted.  Pain is located in the right lumbosacral region and radiates to right buttock and posterior thigh.  It does not cross the knee.  She denies any associated numbness, tingling, weakness or bowel or bladder dysfunction associated with the pain. The pain is not present while at rest.  She can only stand or walk for short periods of time before the pain becomes quite bothersome.  The pain resolves within a few minutes after sitting.  She has been previously treated by Dr. Petersen and has undergone multiple interventional procedures.  These include but may not be limited to right L4-5 and L5-S1 facet injections, right sacroiliac joint injection, as well as right lumbar are phase.  Patient states that these procedures did provide some relief initially but not for very long time.  We do not have complete records regarding her previous treatments at this time.   This pain is described in detail below.    Physical Therapy:  Yes.  Not effective    Non-pharmacologic Treatment:  Rest helps         · TENS?  No    Pain Medications:         · Currently taking:  Nothing    · Has tried in the past:  Tylenol, NSAIDs    · Has not tried:  Opioids, Muscle relaxants, TCAs, SNRIs, anticonvulsants, topical creams    Blood thinners:  None    Interventional Therapies:  Previous procedures performed by Dr. Petersen.     Relevant Surgeries:  None    Affecting sleep?   Yes    Affecting daily activities? yes    Depressive symptoms? no          · SI/HI? No    Work status: Retired    Pain Scores:    Best:       7/10  Worst:     8/10  Usually:   7/10  Today:    7/10    Review of Systems   Constitutional: Negative for activity change, appetite change, chills, fatigue, fever and unexpected weight change.   HENT: Negative for hearing loss.    Eyes: Negative for visual disturbance.   Respiratory: Negative for chest tightness and shortness of breath.    Cardiovascular: Negative for chest pain.   Gastrointestinal: Negative for abdominal pain, constipation, diarrhea, nausea and vomiting.   Genitourinary: Negative for difficulty urinating.   Musculoskeletal: Positive for back pain, gait problem and myalgias. Negative for neck pain.   Skin: Negative for rash.   Neurological: Negative for dizziness, weakness, light-headedness, numbness and headaches.   Psychiatric/Behavioral: Positive for sleep disturbance. Negative for hallucinations and suicidal ideas. The patient is not nervous/anxious.        Past Medical History:   Diagnosis Date    Allergy     Bilateral nonexudative age-related macular degeneration 5/2/2014    Blood transfusion     Glaucoma     Hyperlipidemia     Joint pain     Memory loss     Pseudophakia of both eyes 5/20/2014    Squamous Cell Carcinoma 6/13/13    in situ, excised from L cheek    Syncope 1/7/2014    Ulcerative colitis        Past Surgical History:   Procedure Laterality Date    AHMED GLAUCOMA IMPLANT Right 3/2/16        APPENDECTOMY      CATARACT EXTRACTION W/  INTRAOCULAR LENS IMPLANT Bilateral n/a    Done in [place]    CHOLECYSTECTOMY      COLON SURGERY      COLONOSCOPY  1990s  [facility] (Doc)    Benign polyps removed.    COSMETIC SURGERY      CT COLOGRAPHY  8/31/2007   ([facility])    Sigmoid diverticulosis.    EYE SURGERY  2011    FLEXIBLE SIGMOIDOSCOPY  8/31/[facility]    Unable to complete, had CT colography.     GALLBLADDER SURGERY      INSERTION-IMPLANT-GLAUCOMA/AHMED Right 3/2/2016    Performed by Charlene Leger MD at [facility] OR [address] [place] [address]    NASAL SEPTUM SURGERY      TONSILLECTOMY      TOTAL ABDOMINAL HYSTERECTOMY W/ BILATERAL SALPINGOOPHORECTOMY         Social History     Socioeconomic History    Marital status:      Spouse name: Not on file    Number of children: Not on file    Years of education: Not on file    Highest education level: Not on file   Social Needs    Financial resource strain: Not on file    Food insecurity - worry: Not on file    Food insecurity - inability: Not on file    Transportation needs - medical: Not on file    Transportation needs - non-medical: Not on file   Occupational History    Not on file   Tobacco Use    Smoking status: Former Smoker     Last attempt to quit: 10/2/1974     Years since quittin.4    Smokeless tobacco: Never Used   Substance and Sexual Activity    Alcohol use: Yes     Alcohol/week: 0.0 oz     Comment: Social    Drug use: No    Sexual activity: Yes     Partners: Male     Birth control/protection: Surgical   Other Topics Concern    Are you pregnant or think you may be? No    Breast-feeding No   Social History Narrative    Not on file       Review of patient's allergies indicates:   Allergen Reactions    Doxycycline Nausea And Vomiting       Current Outpatient Medications on File Prior to Visit   Medication Sig Dispense Refill    donepezil (ARICEPT) 10 MG tablet Take 1 tablet (10 mg total) by mouth every evening. 30 tablet 11    glucosamine-chondroitin 500-400 mg tablet Take 1 tablet by mouth 3 (three) times daily.      memantine (NAMENDA XR) 28 mg CSpX Take 1 capsule (28 mg total) by mouth once daily. 30 each 11    midodrine (PROAMATINE) 10 MG tablet Take 1 tablet (10 mg total) by mouth 3 (three) times daily. 270 tablet 3     No current facility-administered medications on file prior to visit.        Objective:      /60    "Pulse 68   Resp 18   Ht 5' 3" (1.6 m)   Wt 71.6 kg (157 lb 14.4 oz)   SpO2 99%   BMI 27.97 kg/m²     Exam:  GEN:  Well developed, well nourished.  No acute distress.  Normal pain behavior.  HEENT:  No trauma.  Mucous membranes moist.  Nares patent bilaterally.  PSYCH: Normal affect. Thought content appropriate.  CHEST:  Breathing symmetric.  No audible wheezing.  ABD: Soft, non-distended.  SKIN:  Warm, pink, dry.  No rash on exposed areas.    EXT:  No cyanosis, clubbing, or edema.  No color change or changes in nail or hair growth.  NEURO/MUSCULOSKELETAL:  Fully alert, oriented, and appropriate. Speech normal mahnaz. No cranial nerve deficits.   Gait:  Normal.  No trendelenburg sign bilaterally.   Motor Strength: 5/5 motor strength throughout lower extremities.   Sensory:  No sensory deficit in the lower extremities.   Reflexes:  1 + and symmetric throughout.  Downgoing Babinski's bilaterally.  No clonus or spasticity.  L-Spine:  Slightly limb ROM with pain on flexion and extension. Positive facet loading bilaterally.  Negative SLR bilaterally.    SI Joint/Hip:  Negative SAM bilaterally.  Negative FADIR bilaterally.  No TTP over lumbar paraspinals, hips, piriformis muscles, or GTB.    TTP over right sacroiliac joint      Imaging:  No pertinent imaging at this time    Assessment:       Encounter Diagnoses   Name Primary?    DDD (degenerative disc disease), lumbar Yes    Lumbar spondylosis     Spinal stenosis of lumbar region, unspecified whether neurogenic claudication present     Other osteoarthritis of spine, lumbosacral region     Sacroiliac joint pain          Plan:       Elda was seen today for low-back pain.    Diagnoses and all orders for this visit:    DDD (degenerative disc disease), lumbar  -     MRI Lumbar Spine Without Contrast; Future    Lumbar spondylosis  -     MRI Lumbar Spine Without Contrast; Future    Spinal stenosis of lumbar region, unspecified whether neurogenic claudication " present  -     MRI Lumbar Spine Without Contrast; Future    Other osteoarthritis of spine, lumbosacral region  -     MRI Lumbar Spine Without Contrast; Future    Sacroiliac joint pain  -     MRI Lumbar Spine Without Contrast; Future        Elda Cui is a 84 y.o. female with chronic right-sided low back pain.  Possible etiologies include spinal stenosis and facet/sacroiliac joint pathology.    1.  Release of information from Dr. Petersen for previous procedures and imaging.  2.  Lumbar MRI to evaluate for spinal stenosis.  3.  Return to clinic in 2 weeks.  At that time we will discuss MRI results and outside records.  Will also discuss available treatment options at that time.

## 2019-03-15 ENCOUNTER — HOSPITAL ENCOUNTER (OUTPATIENT)
Dept: RADIOLOGY | Facility: HOSPITAL | Age: 84
Discharge: HOME | End: 2019-03-15
Attending: PAIN MEDICINE
Payer: MEDICARE

## 2019-03-15 DIAGNOSIS — M48.061 SPINAL STENOSIS OF LUMBAR REGION, UNSPECIFIED WHETHER NEUROGENIC CLAUDICATION PRESENT: ICD-10-CM

## 2019-03-15 DIAGNOSIS — M51.36 DDD (DEGENERATIVE DISC DISEASE), LUMBAR: ICD-10-CM

## 2019-03-15 DIAGNOSIS — M47.816 LUMBAR SPONDYLOSIS: ICD-10-CM

## 2019-03-15 DIAGNOSIS — M47.897 OTHER OSTEOARTHRITIS OF SPINE, LUMBOSACRAL REGION: ICD-10-CM

## 2019-03-15 DIAGNOSIS — M53.3 SACROILIAC JOINT PAIN: ICD-10-CM

## 2019-03-15 PROCEDURE — 72148 MRI LUMBAR SPINE W/O DYE: CPT | Mod: TC

## 2019-03-15 PROCEDURE — 72148 MRI LUMBAR SPINE W/O DYE: CPT | Mod: 26,,, | Performed by: RADIOLOGY

## 2019-03-15 PROCEDURE — 72148 MRI LUMBAR SPINE WITHOUT CONTRAST: ICD-10-PCS | Mod: 26,,, | Performed by: RADIOLOGY

## 2019-03-21 DIAGNOSIS — F03.90 DEMENTIA WITHOUT BEHAVIORAL DISTURBANCE, UNSPECIFIED DEMENTIA TYPE: ICD-10-CM

## 2019-03-21 RX ORDER — MEMANTINE HYDROCHLORIDE 28 MG/1
CAPSULE, EXTENDED RELEASE ORAL
Qty: 30 CAPSULE | Refills: 0 | Status: SHIPPED | OUTPATIENT
Start: 2019-03-21 | End: 2019-04-17 | Stop reason: SDUPTHER

## 2019-03-22 ENCOUNTER — TELEPHONE (OUTPATIENT)
Dept: PAIN MEDICINE | Facility: CLINIC | Age: 84
End: 2019-03-22

## 2019-03-22 NOTE — TELEPHONE ENCOUNTER
Message left reminding patient of Pain Management appointment scheduled for monday at 9a with Dr. Henry.  Location information also included.

## 2019-03-25 ENCOUNTER — OFFICE VISIT (OUTPATIENT)
Dept: PAIN MEDICINE | Facility: CLINIC | Age: 84
End: 2019-03-25
Payer: MEDICARE

## 2019-03-25 VITALS
SYSTOLIC BLOOD PRESSURE: 130 MMHG | HEART RATE: 78 BPM | RESPIRATION RATE: 18 BRPM | BODY MASS INDEX: 28.24 KG/M2 | OXYGEN SATURATION: 98 % | DIASTOLIC BLOOD PRESSURE: 64 MMHG | HEIGHT: 63 IN | WEIGHT: 159.38 LBS

## 2019-03-25 DIAGNOSIS — M47.816 LUMBAR SPONDYLOSIS: ICD-10-CM

## 2019-03-25 DIAGNOSIS — M54.16 LUMBAR RADICULOPATHY: ICD-10-CM

## 2019-03-25 DIAGNOSIS — M48.062 SPINAL STENOSIS OF LUMBAR REGION WITH NEUROGENIC CLAUDICATION: ICD-10-CM

## 2019-03-25 DIAGNOSIS — M51.36 DDD (DEGENERATIVE DISC DISEASE), LUMBAR: Primary | ICD-10-CM

## 2019-03-25 PROCEDURE — 99999 PR PBB SHADOW E&M-EST. PATIENT-LVL III: ICD-10-PCS | Mod: PBBFAC,,, | Performed by: PAIN MEDICINE

## 2019-03-25 PROCEDURE — 99214 PR OFFICE/OUTPT VISIT, EST, LEVL IV, 30-39 MIN: ICD-10-PCS | Mod: S$PBB,,, | Performed by: PAIN MEDICINE

## 2019-03-25 PROCEDURE — 99214 OFFICE O/P EST MOD 30 MIN: CPT | Mod: S$PBB,,, | Performed by: PAIN MEDICINE

## 2019-03-25 PROCEDURE — 99213 OFFICE O/P EST LOW 20 MIN: CPT | Mod: PBBFAC,PN | Performed by: PAIN MEDICINE

## 2019-03-25 PROCEDURE — 99999 PR PBB SHADOW E&M-EST. PATIENT-LVL III: CPT | Mod: PBBFAC,,, | Performed by: PAIN MEDICINE

## 2019-03-25 NOTE — PROGRESS NOTES
Subjective:     Patient ID: Elda Cui is a 84 y.o. female    Chief Complaint: Follow-up (2 week f/u- LOS MRI 03.15)      Referred by: No ref. provider found      HPI:    Interval History (3/25/19):  She returns today for follow up and MRI review.  She reports that her pain is unchanged in quality and location since last encounter.  Outside records show that patient underwent multiple right sacroiliac joint steroid injections.  Patient states that these did provide mild to moderately foot for a very short period of time. She also underwent a right lumbar RFA in December of 2018.  This provided greater than 80% relief but also for a very short time.      Initial Encounter (3/11/19):  Elda Cui is a 84 y.o. female who presents today with chronic right-sided low back pain. This pain has been present for about 3 years.  No specific inciting event or injury noted.  Pain is located in the right lumbosacral region and radiates to right buttock and posterior thigh.  It does not cross the knee.  She denies any associated numbness, tingling, weakness or bowel or bladder dysfunction associated with the pain. The pain is not present while at rest.  She can only stand or walk for short periods of time before the pain becomes quite bothersome.  The pain resolves within a few minutes after sitting.  She has been previously treated by Dr. Petersen and has undergone multiple interventional procedures.  These include but may not be limited to right L4-5 and L5-S1 facet injections, right sacroiliac joint injection, as well as right lumbar are phase.  Patient states that these procedures did provide some relief initially but not for very long time.  We do not have complete records regarding her previous treatments at this time.   This pain is described in detail below.    Physical Therapy:  Yes.  Not effective    Non-pharmacologic Treatment:  Rest helps         · TENS?  No    Pain Medications:          · Currently taking:  Nothing    · Has tried in the past:  Tylenol, NSAIDs    · Has not tried:  Opioids, Muscle relaxants, TCAs, SNRIs, anticonvulsants, topical creams    Blood thinners:  None    Interventional Therapies:  Previous right sacroiliac joint steroid injections and right lumbar RFA performed by Dr. Petersen.     Relevant Surgeries:  None    Affecting sleep?  Yes    Affecting daily activities? yes    Depressive symptoms? no          · SI/HI? No    Work status: Retired    Pain Scores:    Best:       2/10  Worst:     8/10  Usually:   7/10  Today:    2/10    Review of Systems   Constitutional: Negative for activity change, appetite change, chills, fatigue, fever and unexpected weight change.   HENT: Negative for hearing loss.    Eyes: Negative for visual disturbance.   Respiratory: Negative for chest tightness and shortness of breath.    Cardiovascular: Negative for chest pain.   Gastrointestinal: Negative for abdominal pain, constipation, diarrhea, nausea and vomiting.   Genitourinary: Negative for difficulty urinating.   Musculoskeletal: Positive for back pain, gait problem and myalgias. Negative for neck pain.   Skin: Negative for rash.   Neurological: Negative for dizziness, weakness, light-headedness, numbness and headaches.   Psychiatric/Behavioral: Positive for sleep disturbance. Negative for hallucinations and suicidal ideas. The patient is not nervous/anxious.        Past Medical History:   Diagnosis Date    Allergy     Bilateral nonexudative age-related macular degeneration 5/2/2014    Blood transfusion     Glaucoma     Hyperlipidemia     Joint pain     Lumbar spondylosis 3/25/2019    Memory loss     Pseudophakia of both eyes 5/20/2014    Squamous Cell Carcinoma 6/13/13    in situ, excised from L cheek    Syncope 1/7/2014    Ulcerative colitis        Past Surgical History:   Procedure Laterality Date    AHMED GLAUCOMA IMPLANT Right 3/2/16        APPENDECTOMY      CATARACT  EXTRACTION W/  INTRAOCULAR LENS IMPLANT Bilateral n/a    Done in [place]    CHOLECYSTECTOMY      COLON SURGERY      COLONOSCOPY    [place] (Doc)    Benign polyps removed.    COSMETIC SURGERY      [facility]  2007   [facility]    Sigmoid diverticulosis.    EYE SURGERY      FLEXIBLE SIGMOIDOSCOPY  2007  [place] ([facility])    Unable to complete, had CT colography.    GALLBLADDER SURGERY      INSERTION-IMPLANT-GLAUCOMA/AHMED Right 3/2/2016    Performed by [facility] at [facility] OR [address] [place] [address]    NASAL SEPTUM SURGERY      TONSILLECTOMY      TOTAL ABDOMINAL HYSTERECTOMY W/ BILATERAL SALPINGOOPHORECTOMY         Social History     Socioeconomic History    Marital status:      Spouse name: Not on file    Number of children: Not on file    Years of education: Not on file    Highest education level: Not on file   Occupational History    Not on file   Social Needs    Financial resource strain: Not on file    Food insecurity:     Worry: Not on file     Inability: Not on file    Transportation needs:     Medical: Not on file     Non-medical: Not on file   Tobacco Use    Smoking status: Former Smoker     Last attempt to quit: 10/2/1974     Years since quittin.5    Smokeless tobacco: Never Used   Substance and Sexual Activity    Alcohol use: Yes     Alcohol/week: 0.0 oz     Comment: Social    Drug use: No    Sexual activity: Yes     Partners: Male     Birth control/protection: Surgical   Lifestyle    Physical activity:     Days per week: Not on file     Minutes per session: Not on file    Stress: Not on file   Relationships    Social connections:     Talks on phone: Not on file     Gets together: Not on file     Attends [employer] service: Not on file     Active member of club or organization: Not on file     Attends meetings of clubs or organizations: Not on file     Relationship status: Not on file    Intimate partner violence:     Fear of current or  "ex partner: Not on file     Emotionally abused: Not on file     Physically abused: Not on file     Forced sexual activity: Not on file   Other Topics Concern    Are you pregnant or think you may be? No    Breast-feeding No   Social History Narrative    Not on file       Review of patient's allergies indicates:   Allergen Reactions    Doxycycline Nausea And Vomiting       Current Outpatient Medications on File Prior to Visit   Medication Sig Dispense Refill    donepezil (ARICEPT) 10 MG tablet Take 1 tablet (10 mg total) by mouth every evening. 30 tablet 11    glucosamine-chondroitin 500-400 mg tablet Take 1 tablet by mouth 3 (three) times daily.      memantine (NAMENDA XR) 28 mg CSpX TAKE 1 CAPSULE(28 MG) BY MOUTH EVERY DAY 30 capsule 0    midodrine (PROAMATINE) 10 MG tablet Take 1 tablet (10 mg total) by mouth 3 (three) times daily. 270 tablet 3     No current facility-administered medications on file prior to visit.        Objective:      /64   Pulse 78   Resp 18   Ht 5' 3" (1.6 m)   Wt 72.3 kg (159 lb 6.4 oz)   SpO2 98%   BMI 28.24 kg/m²     Exam:  GEN:  Well developed, well nourished.  No acute distress.   HEENT:  No trauma.  Mucous membranes moist.  Nares patent bilaterally.  PSYCH: Normal affect. Thought content appropriate.  CHEST:  Breathing symmetric.  No audible wheezing.  ABD: Soft, non-distended.  SKIN:  Warm, pink, dry.  No rash on exposed areas.    EXT:  No cyanosis, clubbing, or edema.  No color change or changes in nail or hair growth.  NEURO/MUSCULOSKELETAL:  Fully alert, oriented, and appropriate. Speech normal mahnaz. No cranial nerve deficits.   Gait:  Normal.  No focal motor deficits.       Imaging:  Narrative     EXAMINATION:  MRI LUMBAR SPINE WITHOUT CONTRAST    CLINICAL HISTORY:  lumbar spinal stenosis; Other intervertebral disc degeneration, lumbar region    TECHNIQUE:  Multiplanar, multisequence MR images were acquired from the thoracolumbar junction to the sacrum " without the administration of contrast.    COMPARISON:  None.    FINDINGS:  The distal cord/conus demonstrates normal size and signal.  No evidence of osteomyelitis, marrow replacement process, or acute fracture.  Probable small left renal cyst.  No paraspinal masses.    At T12-L1, there is a small right paracentral disc protrusion.  No spinal canal stenosis.    At L1-2, there is mild disc bulging and facet hypertrophy, resulting in mild spinal canal stenosis.  No significant neural foraminal narrowing.    At L2-3, there is asymmetric disc bulging to the left with facet hypertrophy, resulting in mild spinal canal stenosis, moderate left and mild right-sided neural foraminal narrowing.    At L3-4, there is moderate disc bulging and facet hypertrophy, resulting in mild spinal canal stenosis, and moderate left and mild right-sided neural foraminal narrowing.    At L4-5, there is prominent facet arthropathy.  There is mild disc bulging.  This results in moderate left and mild right-sided neural foraminal narrowing.  No spinal canal stenosis.    At L5-S1, there is there is prominent degenerative disc disease, noting disc height loss and sub endplate marrow edema.  There is asymmetric disc bulging to the right.  This results in severe right and moderate left-sided neural foraminal narrowing.  No spinal canal stenosis.   Impression       Lumbar spondylosis, resulting in mild spinal canal stenosis from L1-2 thru L3-4, and moderate/ severe neural foraminal narrowing from L2-3 through L5-S1, as above.      Electronically signed by: Chris Farias MD  Date: 03/15/2019  Time: 10:29    Encounter     View Encounter                   Assessment:       Encounter Diagnoses   Name Primary?    DDD (degenerative disc disease), lumbar Yes    Lumbar spondylosis     Spinal stenosis of lumbar region with neurogenic claudication          Plan:       Elda was seen today for follow-up.    Diagnoses and all orders for this visit:    DDD  (degenerative disc disease), lumbar    Lumbar spondylosis    Spinal stenosis of lumbar region with neurogenic claudication        Elda Cui is a 84 y.o. female with chronic right-sided low back pain.  Possible etiologies include spinal stenosis and facet/sacroiliac joint pathology.  She has failed previous right sacroiliac joint steroid injections and right lumbar RFA.  MRI with some spinal stenosis noted.    1.  Pertinent imaging studies reviewed by me. Imaging results were discussed with patient.  2.  Schedule for L5-S1 interlaminar epidural steroid injection.  3.  Return to clinic 2 weeks after procedure. At that time we will discuss efficacy and other treatment options if needed.  4.  We did discuss that is appropriate to continue taking naproxen as needed for pain.  We discussed potential GI and renal adverse effects.  I advised patient to take this medication with food and discontinue if she experiences any GI upset or black/bloody stools.

## 2019-03-25 NOTE — H&P (VIEW-ONLY)
Subjective:     Patient ID: Elda Cui is a 84 y.o. female    Chief Complaint: Follow-up (2 week f/u- LOS MRI 03.15)      Referred by: No ref. provider found      HPI:    Interval History (3/25/19):  She returns today for follow up and MRI review.  She reports that her pain is unchanged in quality and location since last encounter.  Outside records show that patient underwent multiple right sacroiliac joint steroid injections.  Patient states that these did provide mild to moderately foot for a very short period of time. She also underwent a right lumbar RFA in December of 2018.  This provided greater than 80% relief but also for a very short time.      Initial Encounter (3/11/19):  Elda Cui is a 84 y.o. female who presents today with chronic right-sided low back pain. This pain has been present for about 3 years.  No specific inciting event or injury noted.  Pain is located in the right lumbosacral region and radiates to right buttock and posterior thigh.  It does not cross the knee.  She denies any associated numbness, tingling, weakness or bowel or bladder dysfunction associated with the pain. The pain is not present while at rest.  She can only stand or walk for short periods of time before the pain becomes quite bothersome.  The pain resolves within a few minutes after sitting.  She has been previously treated by Dr. Petersen and has undergone multiple interventional procedures.  These include but may not be limited to right L4-5 and L5-S1 facet injections, right sacroiliac joint injection, as well as right lumbar are phase.  Patient states that these procedures did provide some relief initially but not for very long time.  We do not have complete records regarding her previous treatments at this time.   This pain is described in detail below.    Physical Therapy:  Yes.  Not effective    Non-pharmacologic Treatment:  Rest helps         · TENS?  No    Pain Medications:          · Currently taking:  Nothing    · Has tried in the past:  Tylenol, NSAIDs    · Has not tried:  Opioids, Muscle relaxants, TCAs, SNRIs, anticonvulsants, topical creams    Blood thinners:  None    Interventional Therapies:  Previous right sacroiliac joint steroid injections and right lumbar RFA performed by Dr. Petersen.     Relevant Surgeries:  None    Affecting sleep?  Yes    Affecting daily activities? yes    Depressive symptoms? no          · SI/HI? No    Work status: Retired    Pain Scores:    Best:       2/10  Worst:     8/10  Usually:   7/10  Today:    2/10    Review of Systems   Constitutional: Negative for activity change, appetite change, chills, fatigue, fever and unexpected weight change.   HENT: Negative for hearing loss.    Eyes: Negative for visual disturbance.   Respiratory: Negative for chest tightness and shortness of breath.    Cardiovascular: Negative for chest pain.   Gastrointestinal: Negative for abdominal pain, constipation, diarrhea, nausea and vomiting.   Genitourinary: Negative for difficulty urinating.   Musculoskeletal: Positive for back pain, gait problem and myalgias. Negative for neck pain.   Skin: Negative for rash.   Neurological: Negative for dizziness, weakness, light-headedness, numbness and headaches.   Psychiatric/Behavioral: Positive for sleep disturbance. Negative for hallucinations and suicidal ideas. The patient is not nervous/anxious.        Past Medical History:   Diagnosis Date    Allergy     Bilateral nonexudative age-related macular degeneration 5/2/2014    Blood transfusion     Glaucoma     Hyperlipidemia     Joint pain     Lumbar spondylosis 3/25/2019    Memory loss     Pseudophakia of both eyes 5/20/2014    Squamous Cell Carcinoma 6/13/13    in situ, excised from L cheek    Syncope 1/7/2014    Ulcerative colitis        Past Surgical History:   Procedure Laterality Date    AHMED GLAUCOMA IMPLANT Right 3/2/16        APPENDECTOMY      CATARACT  EXTRACTION W/  INTRAOCULAR LENS IMPLANT Bilateral n/a    Done in [place]    CHOLECYSTECTOMY      COLON SURGERY      COLONOSCOPY    [facility] (Doc)    Benign polyps removed.    COSMETIC SURGERY      CT COLOGRAPHY  2007   ([facility] [place])    Sigmoid diverticulosis.    EYE SURGERY      FLEXIBLE SIGMOIDOSCOPY  2007  [place] ([facility] [place])    Unable to complete, had CT colography.    GALLBLADDER SURGERY      INSERTION-IMPLANT-GLAUCOMA/AHMED Right 3/2/2016    Performed by Charlene Leger MD [facility] OR [address] [place] [address]    NASAL SEPTUM SURGERY      TONSILLECTOMY      TOTAL ABDOMINAL HYSTERECTOMY W/ BILATERAL SALPINGOOPHORECTOMY         Social History     Socioeconomic History    Marital status:      Spouse name: Not on file    Number of children: Not on file    Years of education: Not on file    Highest education level: Not on file   Occupational History    Not on file   Social Needs    Financial resource strain: Not on file    Food insecurity:     Worry: Not on file     Inability: Not on file    Transportation needs:     Medical: Not on file     Non-medical: Not on file   Tobacco Use    Smoking status: Former Smoker     Last attempt to quit: 10/2/1974     Years since quittin.5    Smokeless tobacco: Never Used   Substance and Sexual Activity    Alcohol use: Yes     Alcohol/week: 0.0 oz     Comment: Social    Drug use: No    Sexual activity: Yes     Partners: Male     Birth control/protection: Surgical   Lifestyle    Physical activity:     Days per week: Not on file     Minutes per session: Not on file    Stress: Not on file   Relationships    Social connections:     Talks on phone: Not on file     Gets together: Not on file     Attends Zoroastrian service: Not on file     Active member of club or organization: Not on file     Attends meetings of clubs or organizations: Not on file     Relationship status: Not on file    Intimate partner violence:     Fear of current or  "ex partner: Not on file     Emotionally abused: Not on file     Physically abused: Not on file     Forced sexual activity: Not on file   Other Topics Concern    Are you pregnant or think you may be? No    Breast-feeding No   Social History Narrative    Not on file       Review of patient's allergies indicates:   Allergen Reactions    Doxycycline Nausea And Vomiting       Current Outpatient Medications on File Prior to Visit   Medication Sig Dispense Refill    donepezil (ARICEPT) 10 MG tablet Take 1 tablet (10 mg total) by mouth every evening. 30 tablet 11    glucosamine-chondroitin 500-400 mg tablet Take 1 tablet by mouth 3 (three) times daily.      memantine (NAMENDA XR) 28 mg CSpX TAKE 1 CAPSULE(28 MG) BY MOUTH EVERY DAY 30 capsule 0    midodrine (PROAMATINE) 10 MG tablet Take 1 tablet (10 mg total) by mouth 3 (three) times daily. 270 tablet 3     No current facility-administered medications on file prior to visit.        Objective:      /64   Pulse 78   Resp 18   Ht 5' 3" (1.6 m)   Wt 72.3 kg (159 lb 6.4 oz)   SpO2 98%   BMI 28.24 kg/m²     Exam:  GEN:  Well developed, well nourished.  No acute distress.   HEENT:  No trauma.  Mucous membranes moist.  Nares patent bilaterally.  PSYCH: Normal affect. Thought content appropriate.  CHEST:  Breathing symmetric.  No audible wheezing.  ABD: Soft, non-distended.  SKIN:  Warm, pink, dry.  No rash on exposed areas.    EXT:  No cyanosis, clubbing, or edema.  No color change or changes in nail or hair growth.  NEURO/MUSCULOSKELETAL:  Fully alert, oriented, and appropriate. Speech normal mahnaz. No cranial nerve deficits.   Gait:  Normal.  No focal motor deficits.       Imaging:  Narrative     EXAMINATION:  MRI LUMBAR SPINE WITHOUT CONTRAST    CLINICAL HISTORY:  lumbar spinal stenosis; Other intervertebral disc degeneration, lumbar region    TECHNIQUE:  Multiplanar, multisequence MR images were acquired from the thoracolumbar junction to the sacrum " without the administration of contrast.    COMPARISON:  None.    FINDINGS:  The distal cord/conus demonstrates normal size and signal.  No evidence of osteomyelitis, marrow replacement process, or acute fracture.  Probable small left renal cyst.  No paraspinal masses.    At T12-L1, there is a small right paracentral disc protrusion.  No spinal canal stenosis.    At L1-2, there is mild disc bulging and facet hypertrophy, resulting in mild spinal canal stenosis.  No significant neural foraminal narrowing.    At L2-3, there is asymmetric disc bulging to the left with facet hypertrophy, resulting in mild spinal canal stenosis, moderate left and mild right-sided neural foraminal narrowing.    At L3-4, there is moderate disc bulging and facet hypertrophy, resulting in mild spinal canal stenosis, and moderate left and mild right-sided neural foraminal narrowing.    At L4-5, there is prominent facet arthropathy.  There is mild disc bulging.  This results in moderate left and mild right-sided neural foraminal narrowing.  No spinal canal stenosis.    At L5-S1, there is there is prominent degenerative disc disease, noting disc height loss and sub endplate marrow edema.  There is asymmetric disc bulging to the right.  This results in severe right and moderate left-sided neural foraminal narrowing.  No spinal canal stenosis.   Impression       Lumbar spondylosis, resulting in mild spinal canal stenosis from L1-2 thru L3-4, and moderate/ severe neural foraminal narrowing from L2-3 through L5-S1, as above.      Electronically signed by: Chris Farias MD  Date: 03/15/2019  Time: 10:29    Encounter     View Encounter                   Assessment:       Encounter Diagnoses   Name Primary?    DDD (degenerative disc disease), lumbar Yes    Lumbar spondylosis     Spinal stenosis of lumbar region with neurogenic claudication          Plan:       Elda was seen today for follow-up.    Diagnoses and all orders for this visit:    DDD  (degenerative disc disease), lumbar    Lumbar spondylosis    Spinal stenosis of lumbar region with neurogenic claudication        Elda Cui is a 84 y.o. female with chronic right-sided low back pain.  Possible etiologies include spinal stenosis and facet/sacroiliac joint pathology.  She has failed previous right sacroiliac joint steroid injections and right lumbar RFA.  MRI with some spinal stenosis noted.    1.  Pertinent imaging studies reviewed by me. Imaging results were discussed with patient.  2.  Schedule for L5-S1 interlaminar epidural steroid injection.  3.  Return to clinic 2 weeks after procedure. At that time we will discuss efficacy and other treatment options if needed.  4.  We did discuss that is appropriate to continue taking naproxen as needed for pain.  We discussed potential GI and renal adverse effects.  I advised patient to take this medication with food and discontinue if she experiences any GI upset or black/bloody stools.

## 2019-03-25 NOTE — PROGRESS NOTES
Ms. Cui will be scheduled for L4-5 SHARDA on 03/28/2019.  Reviewed the pre-procedure instructions listed below with her- copy also provided.  Instructed patient to notify clinic if she begins feeling ill, runs a fever, is prescribed antibiotics, and/or has any outpatient procedures within the two weeks leading up to this procedure.  Instructed patient to report to [facility], 2nd Floor Endoscopy Registration Desk.  All questions answered- patient verbalized understanding.     Day of Procedure  - Ensure you have obtained an arrival time from the Pain Management Staff  o Procedure Area will call 1-3 days in advance with your arrival time.  Please check any voicemails received.  o If you arrive past your scheduled procedure time, you may be asked to reschedule your procedure.  - Ensure you have a  with you to remain present throughout your procedure for your safety.  o If you arrive without a responsible adult to stay with you and drive you home, you may be asked to reschedule your procedure.  - Take all of your prescribed medications (exceptions noted below) with a small amount of water  - This is NOT a fasting procedure, you may have a light meal before coming.  - Wear comfortable clothing (loose fitting pants).  - You may wear glasses, dentures, contact lenses, and/or hearing aids. Please remove all jewelry and metal hairpins.  - Notify the nurse during the intake process if you are allergic to any medications, if you are diabetic, or if you are not feeling well  - Contact the Pain Management Clinic with the following:  o A fever greater than 100° (degrees)   o Feel ill, have any type of infection, or are taking antibiotics now or within the two (2) weeks leading up to this procedure  o Have any outpatient procedures within the two (2) weeks leading up to this procedure (colonoscopy, major dental work, etc.)    IF you are taking blood thinners: Only upon receiving clearance and notification  from the Pain Management Department  7 Days Prior to Your Procedure:  - Stop taking Plavix/Clopridogrel, Effient/Prasugel, ASA  5 Days Prior to Your Procedure:  - Stop taking Coumadin/Warfarin.  An INR may be drawn prior to your procedure.  If labs are required, you will need to arrive earlier than your scheduled arrival time.  - Stop taking Pradaxa/Dabigatra,  Brilinta/ Ticagrelor  3 Days Prior to Your Procedure:  - Stop taking Xarelto/Rivaroxaban, Eliquis/Apixaban, Aggrenox/Dipyridamole, Reopro/Abciximab

## 2019-03-28 ENCOUNTER — HOSPITAL ENCOUNTER (OUTPATIENT)
Dept: RADIOLOGY | Facility: HOSPITAL | Age: 84
Discharge: HOME | End: 2019-03-28
Attending: PAIN MEDICINE | Admitting: PAIN MEDICINE
Payer: MEDICARE

## 2019-03-28 ENCOUNTER — HOSPITAL ENCOUNTER (OUTPATIENT)
Facility: HOSPITAL | Age: 84
Discharge: HOME | End: 2019-03-28
Attending: PAIN MEDICINE | Admitting: PAIN MEDICINE
Payer: MEDICARE

## 2019-03-28 VITALS
OXYGEN SATURATION: 97 % | HEART RATE: 64 BPM | DIASTOLIC BLOOD PRESSURE: 67 MMHG | SYSTOLIC BLOOD PRESSURE: 146 MMHG | RESPIRATION RATE: 18 BRPM | TEMPERATURE: 98 F

## 2019-03-28 DIAGNOSIS — M51.36 LUMBAR DEGENERATIVE DISC DISEASE: ICD-10-CM

## 2019-03-28 DIAGNOSIS — M47.816 LUMBAR SPONDYLOSIS: ICD-10-CM

## 2019-03-28 DIAGNOSIS — M51.36 DDD (DEGENERATIVE DISC DISEASE), LUMBAR: Primary | ICD-10-CM

## 2019-03-28 PROCEDURE — 77003 FLUOROGUIDE FOR SPINE INJECT: CPT | Performed by: PAIN MEDICINE

## 2019-03-28 PROCEDURE — 25000003 PHARM REV CODE 250: Performed by: PAIN MEDICINE

## 2019-03-28 PROCEDURE — 62323 NJX INTERLAMINAR LMBR/SAC: CPT | Performed by: PAIN MEDICINE

## 2019-03-28 PROCEDURE — 62323 NJX INTERLAMINAR LMBR/SAC: CPT | Mod: ,,, | Performed by: PAIN MEDICINE

## 2019-03-28 PROCEDURE — 62323 PR INJ LUMBAR/SACRAL, W/IMAGING GUIDANCE: ICD-10-PCS | Mod: ,,, | Performed by: PAIN MEDICINE

## 2019-03-28 PROCEDURE — 25000003 PHARM REV CODE 250

## 2019-03-28 PROCEDURE — 25500020 PHARM REV CODE 255: Performed by: PAIN MEDICINE

## 2019-03-28 PROCEDURE — 62322 NJX INTERLAMINAR LMBR/SAC: CPT | Performed by: PAIN MEDICINE

## 2019-03-28 PROCEDURE — 63600175 PHARM REV CODE 636 W HCPCS: Performed by: PAIN MEDICINE

## 2019-03-28 RX ORDER — ALPRAZOLAM 0.5 MG/1
TABLET, ORALLY DISINTEGRATING ORAL
Status: COMPLETED
Start: 2019-03-28 | End: 2019-03-28

## 2019-03-28 RX ORDER — DEXAMETHASONE SODIUM PHOSPHATE 10 MG/ML
10 INJECTION INTRAMUSCULAR; INTRAVENOUS ONCE
Status: COMPLETED | OUTPATIENT
Start: 2019-03-28 | End: 2019-03-28

## 2019-03-28 RX ORDER — LIDOCAINE HYDROCHLORIDE 20 MG/ML
10 INJECTION, SOLUTION INFILTRATION; PERINEURAL ONCE
Status: COMPLETED | OUTPATIENT
Start: 2019-03-28 | End: 2019-03-28

## 2019-03-28 RX ORDER — ALPRAZOLAM 0.5 MG/1
1 TABLET, ORALLY DISINTEGRATING ORAL ONCE AS NEEDED
Status: COMPLETED | OUTPATIENT
Start: 2019-03-28 | End: 2019-03-28

## 2019-03-28 RX ORDER — LIDOCAINE HYDROCHLORIDE 10 MG/ML
INJECTION, SOLUTION EPIDURAL; INFILTRATION; INTRACAUDAL; PERINEURAL
Status: DISCONTINUED
Start: 2019-03-28 | End: 2019-03-28 | Stop reason: HOSPADM

## 2019-03-28 RX ORDER — DEXAMETHASONE SODIUM PHOSPHATE 10 MG/ML
INJECTION INTRAMUSCULAR; INTRAVENOUS
Status: DISCONTINUED
Start: 2019-03-28 | End: 2019-03-28 | Stop reason: HOSPADM

## 2019-03-28 RX ORDER — LIDOCAINE HYDROCHLORIDE 10 MG/ML
1 INJECTION, SOLUTION EPIDURAL; INFILTRATION; INTRACAUDAL; PERINEURAL ONCE
Status: COMPLETED | OUTPATIENT
Start: 2019-03-28 | End: 2019-03-28

## 2019-03-28 RX ORDER — LIDOCAINE HYDROCHLORIDE 20 MG/ML
INJECTION, SOLUTION INFILTRATION; PERINEURAL
Status: DISCONTINUED
Start: 2019-03-28 | End: 2019-03-28 | Stop reason: HOSPADM

## 2019-03-28 RX ADMIN — ALPRAZOLAM 1 MG: 0.5 TABLET, ORALLY DISINTEGRATING ORAL at 01:03

## 2019-03-28 NOTE — DISCHARGE INSTRUCTIONS

## 2019-03-28 NOTE — OP NOTE
Lumbar Interlaminar Epidural Steroid Injection under Fluoroscopic Guidance.  Time-out taken to identify patient and procedure side prior to starting the procedure.   I attest that I have reviewed the patient's home medications prior to the procedure and no contraindication have been identified. I  re-evaluated the patient after the patient was positioned for the procedure in the procedure room immediately before the procedural time-out. The vital signs are current and represent the current state of the patient which has not significantly changed since the preprocedure assessment.                                                               Date of Service: 03/28/2019    PCP: Cnidi Hendricks MD    Referring Physician:    Procedure:  L4-5 Interlaminar epidural steroid injection under fluoroscopic guidance.    Reasons for procedure: DDD (degenerative disc disease), lumbar [M51.36]  Lumbar radiculopathy [M54.16]     Physician: Anders Henry MD  ASSISTANTS: None    Medications injected: Preservative-free dexamethasone 10mg with 2mL of sterile Xylocaine-MPF 1% and 2ml of sterile preservative-free normal saline.    Local anesthetic injected:    Xylocaine 2% 9ml with Sodium Bicarbonate 3ml.   Sedation Medications: None    Estimated blood loss:  none.    Complications:  none.    Technique:  With the patient laying in a prone position, the area was prepped and draped in the usual sterile fashion using ChloraPrep and a fenestrated drape.  Local anesthetic was given using a 27-gauge needle by raising a wheal and going down to the hub of the needle.  A 3.5 inch 20-gauge Touhy needle was introduced under fluoroscopic guidance.  It met the lamina of the posterior element. The needle was then hinged above the lamina.  Loss of resistance technique was employed while advancing the needle.  Once in the desired position, contrast dye Omnipaque was injected to confirm placement and there was no vascular runoff.  Digital  subtraction was employed to confirm that there was no vascular runoff.  The medication was then injected slowly.  The patient tolerated the procedure well.      Pain before the procedure: 5/10    Pain after the procedure: 0/10    The patient was monitored after the procedure.   They were given post-procedure and discharge instructions to follow at home.  The patient was discharged in a stable condition.

## 2019-03-28 NOTE — INTERVAL H&P NOTE
The patient has been examined and the H&P has been reviewed:    I concur with the findings and no changes have occurred since H&P was written.    Anesthesia/Surgery risks, benefits and alternative options discussed and understood by patient/family.          Active Hospital Problems    Diagnosis  POA    Lumbar degenerative disc disease [M51.36]  Yes      Resolved Hospital Problems   No resolved problems to display.

## 2019-04-11 ENCOUNTER — OFFICE VISIT (OUTPATIENT)
Dept: PAIN MEDICINE | Facility: CLINIC | Age: 84
End: 2019-04-11
Payer: MEDICARE

## 2019-04-11 VITALS
HEIGHT: 63 IN | WEIGHT: 155 LBS | BODY MASS INDEX: 27.46 KG/M2 | DIASTOLIC BLOOD PRESSURE: 61 MMHG | OXYGEN SATURATION: 96 % | RESPIRATION RATE: 18 BRPM | HEART RATE: 70 BPM | SYSTOLIC BLOOD PRESSURE: 129 MMHG

## 2019-04-11 DIAGNOSIS — M51.36 DDD (DEGENERATIVE DISC DISEASE), LUMBAR: Primary | ICD-10-CM

## 2019-04-11 DIAGNOSIS — M48.062 SPINAL STENOSIS OF LUMBAR REGION WITH NEUROGENIC CLAUDICATION: ICD-10-CM

## 2019-04-11 DIAGNOSIS — M47.816 LUMBAR SPONDYLOSIS: ICD-10-CM

## 2019-04-11 PROCEDURE — 99999 PR PBB SHADOW E&M-EST. PATIENT-LVL III: ICD-10-PCS | Mod: PBBFAC,,, | Performed by: PAIN MEDICINE

## 2019-04-11 PROCEDURE — 99213 PR OFFICE/OUTPT VISIT, EST, LEVL III, 20-29 MIN: ICD-10-PCS | Mod: S$PBB,,, | Performed by: PAIN MEDICINE

## 2019-04-11 PROCEDURE — 99213 OFFICE O/P EST LOW 20 MIN: CPT | Mod: S$PBB,,, | Performed by: PAIN MEDICINE

## 2019-04-11 PROCEDURE — 99999 PR PBB SHADOW E&M-EST. PATIENT-LVL III: CPT | Mod: PBBFAC,,, | Performed by: PAIN MEDICINE

## 2019-04-11 PROCEDURE — 99213 OFFICE O/P EST LOW 20 MIN: CPT | Mod: PBBFAC,PN | Performed by: PAIN MEDICINE

## 2019-04-11 NOTE — PROGRESS NOTES
Subjective:     Patient ID: Elda Cui is a 84 y.o. female    Chief Complaint: Follow-up (S/P L4-5 SHARDA 03.28.19)      Referred by: No ref. provider found      HPI:    Interval History (4/11/19):  She returns today for follow up.  She reports that L4-5 interlaminar epidural steroid injection has been helpful for the low back and lower extremity pain.  She reports 100% relief immediately following the procedure for a few days.  The degree of relief has since slightly decreased and she is currently reporting 80% relief.  She denies any new or worsening symptoms.      Interval History (3/25/19):  She returns today for follow up and MRI review.  She reports that her pain is unchanged in quality and location since last encounter.  Outside records show that patient underwent multiple right sacroiliac joint steroid injections.  Patient states that these did provide mild to moderately foot for a very short period of time. She also underwent a right lumbar RFA in December of 2018.  This provided greater than 80% relief but also for a very short time.      Initial Encounter (3/11/19):  Elda Cui is a 84 y.o. female who presents today with chronic right-sided low back pain. This pain has been present for about 3 years.  No specific inciting event or injury noted.  Pain is located in the right lumbosacral region and radiates to right buttock and posterior thigh.  It does not cross the knee.  She denies any associated numbness, tingling, weakness or bowel or bladder dysfunction associated with the pain. The pain is not present while at rest.  She can only stand or walk for short periods of time before the pain becomes quite bothersome.  The pain resolves within a few minutes after sitting.  She has been previously treated by Dr. Petersen and has undergone multiple interventional procedures.  These include but may not be limited to right L4-5 and L5-S1 facet injections, right sacroiliac joint injection,  as well as right lumbar are phase.  Patient states that these procedures did provide some relief initially but not for very long time.  We do not have complete records regarding her previous treatments at this time.   This pain is described in detail below.    Physical Therapy:  Yes.  Not effective    Non-pharmacologic Treatment:  Rest helps         · TENS?  No    Pain Medications:         · Currently taking:  Nothing    · Has tried in the past:  Tylenol, NSAIDs    · Has not tried:  Opioids, Muscle relaxants, TCAs, SNRIs, anticonvulsants, topical creams    Blood thinners:  None    Interventional Therapies:    Previous right sacroiliac joint steroid injections and right lumbar RFA performed by Dr. Petersen.   3/28/19 - L4-5 interlaminar epidural steroid injection - 80% relief    Relevant Surgeries:  None    Affecting sleep?  Yes    Affecting daily activities? yes    Depressive symptoms? no          · SI/HI? No    Work status: Retired    Pain Scores:    Best:       2/10  Worst:     8/10  Usually:   7/10  Today:    4/10    Review of Systems   Constitutional: Negative for activity change, appetite change, chills, fatigue, fever and unexpected weight change.   HENT: Negative for hearing loss.    Eyes: Negative for visual disturbance.   Respiratory: Negative for chest tightness and shortness of breath.    Cardiovascular: Negative for chest pain.   Gastrointestinal: Negative for abdominal pain, constipation, diarrhea, nausea and vomiting.   Genitourinary: Negative for difficulty urinating.   Musculoskeletal: Positive for back pain, gait problem and myalgias. Negative for neck pain.   Skin: Negative for rash.   Neurological: Negative for dizziness, weakness, light-headedness, numbness and headaches.   Psychiatric/Behavioral: Positive for sleep disturbance. Negative for hallucinations and suicidal ideas. The patient is not nervous/anxious.        Past Medical History:   Diagnosis Date    Allergy     Bilateral nonexudative  age-related macular degeneration 2014    Blood transfusion     Glaucoma     Hyperlipidemia     Joint pain     Lumbar spondylosis 3/25/2019    Memory loss     Pseudophakia of both eyes 2014    Squamous Cell Carcinoma 13    in situ, excised from L cheek    Syncope 2014    Ulcerative colitis        Past Surgical History:   Procedure Laterality Date    AHMED GLAUCOMA IMPLANT Right 3/2/16        APPENDECTOMY      CATARACT EXTRACTION W/  INTRAOCULAR LENS IMPLANT Bilateral n/a    Done in [place]    CHOLECYSTECTOMY      COLON SURGERY      COLONOSCOPY    [facility] (Doc)    Benign polyps removed.    COSMETIC SURGERY      CT COL[facility]GRAPHY  2007   ([facility])    Sigmoid diverticulosis.    EYE SURGERY      FLEXIBLE SIGMOIDOSCOPY  2007  [facility] ([facility])    Unable to complete, had CT colography.    GALLBLADDER SURGERY      Injection, Steroid, Epidural N/A 3/28/2019    Performed by Anders Henry Jr., [facility] at [facility] ENDO    INSERTION-IMPLANT-GLAUCOMA/AHMED Right 3/2/2016    Performed by Charlene Leger MD at [facility] OR [address] [place] [address]    NASAL SEPTUM SURGERY      TONSILLECTOMY      TOTAL ABDOMINAL HYSTERECTOMY W/ BILATERAL SALPINGOOPHORECTOMY         Social History     Socioeconomic History    Marital status:      Spouse name: Not on file    Number of children: Not on file    Years of education: Not on file    Highest education level: Not on file   Occupational History    Not on file   Social Needs    Financial resource strain: Not on file    Food insecurity:     Worry: Not on file     Inability: Not on file    Transportation needs:     Medical: Not on file     Non-medical: Not on file   Tobacco Use    Smoking status: Former Smoker     Last attempt to quit: 10/2/1974     Years since quittin.5    Smokeless tobacco: Never Used   Substance and Sexual Activity    Alcohol use: Yes     Alcohol/week: 0.0 oz     Comment: Social    Drug  "use: No    Sexual activity: Yes     Partners: Male     Birth control/protection: Surgical   Lifestyle    Physical activity:     Days per week: Not on file     Minutes per session: Not on file    Stress: Not on file   Relationships    Social connections:     Talks on phone: Not on file     Gets together: Not on file     Attends Orthodoxy service: Not on file     Active member of club or organization: Not on file     Attends meetings of clubs or organizations: Not on file     Relationship status: Not on file   Other Topics Concern    Are you pregnant or think you may be? No    Breast-feeding No   Social History Narrative    Not on file       Review of patient's allergies indicates:   Allergen Reactions    Doxycycline Nausea And Vomiting       Current Outpatient Medications on File Prior to Visit   Medication Sig Dispense Refill    donepezil (ARICEPT) 10 MG tablet Take 1 tablet (10 mg total) by mouth every evening. 30 tablet 11    glucosamine-chondroitin 500-400 mg tablet Take 1 tablet by mouth 3 (three) times daily.      memantine (NAMENDA XR) 28 mg CSpX TAKE 1 CAPSULE(28 MG) BY MOUTH EVERY DAY 30 capsule 0    midodrine (PROAMATINE) 10 MG tablet Take 1 tablet (10 mg total) by mouth 3 (three) times daily. 270 tablet 3     No current facility-administered medications on file prior to visit.        Objective:      /61   Pulse 70   Resp 18   Ht 5' 3" (1.6 m)   Wt 70.3 kg (155 lb)   SpO2 96%   BMI 27.46 kg/m²     Exam:  GEN:  Well developed, well nourished.  No acute distress.   HEENT:  No trauma.  Mucous membranes moist.  Nares patent bilaterally.  PSYCH: Normal affect. Thought content appropriate.  CHEST:  Breathing symmetric.  No audible wheezing.  ABD: Soft, non-distended.  SKIN:  Warm, pink, dry.  No rash on exposed areas.    EXT:  No cyanosis, clubbing, or edema.  No color change or changes in nail or hair growth.  NEURO/MUSCULOSKELETAL:  Fully alert, oriented, and appropriate. Speech normal " mahnaz. No cranial nerve deficits.   Gait:  Normal.  No focal motor deficits.       Imaging:  Narrative     EXAMINATION:  MRI LUMBAR SPINE WITHOUT CONTRAST    CLINICAL HISTORY:  lumbar spinal stenosis; Other intervertebral disc degeneration, lumbar region    TECHNIQUE:  Multiplanar, multisequence MR images were acquired from the thoracolumbar junction to the sacrum without the administration of contrast.    COMPARISON:  None.    FINDINGS:  The distal cord/conus demonstrates normal size and signal.  No evidence of osteomyelitis, marrow replacement process, or acute fracture.  Probable small left renal cyst.  No paraspinal masses.    At T12-L1, there is a small right paracentral disc protrusion.  No spinal canal stenosis.    At L1-2, there is mild disc bulging and facet hypertrophy, resulting in mild spinal canal stenosis.  No significant neural foraminal narrowing.    At L2-3, there is asymmetric disc bulging to the left with facet hypertrophy, resulting in mild spinal canal stenosis, moderate left and mild right-sided neural foraminal narrowing.    At L3-4, there is moderate disc bulging and facet hypertrophy, resulting in mild spinal canal stenosis, and moderate left and mild right-sided neural foraminal narrowing.    At L4-5, there is prominent facet arthropathy.  There is mild disc bulging.  This results in moderate left and mild right-sided neural foraminal narrowing.  No spinal canal stenosis.    At L5-S1, there is there is prominent degenerative disc disease, noting disc height loss and sub endplate marrow edema.  There is asymmetric disc bulging to the right.  This results in severe right and moderate left-sided neural foraminal narrowing.  No spinal canal stenosis.   Impression       Lumbar spondylosis, resulting in mild spinal canal stenosis from L1-2 thru L3-4, and moderate/ severe neural foraminal narrowing from L2-3 through L5-S1, as above.      Electronically signed by: Chris Farias MD  Date:  03/15/2019  Time: 10:29    Encounter     View Encounter                   Assessment:       Encounter Diagnoses   Name Primary?    DDD (degenerative disc disease), lumbar Yes    Lumbar spondylosis     Spinal stenosis of lumbar region with neurogenic claudication          Plan:       Elad was seen today for follow-up.    Diagnoses and all orders for this visit:    DDD (degenerative disc disease), lumbar    Lumbar spondylosis    Spinal stenosis of lumbar region with neurogenic claudication        Elda Cui is a 84 y.o. female with chronic right-sided low back pain.  Possible etiologies include spinal stenosis and facet/sacroiliac joint pathology.  She has failed previous right sacroiliac joint steroid injections and right lumbar RFA.  MRI with some spinal stenosis noted. Very good relief following interlaminar epidural steroid injection.    1.  We discussed that we may consider repeating epidural steroid injection in the future if needed.  Patient was consented for repeat injection today.  She will call to schedule this procedure if she needs in the future.  2.  Return to clinic as needed.

## 2019-04-17 DIAGNOSIS — F03.90 DEMENTIA WITHOUT BEHAVIORAL DISTURBANCE, UNSPECIFIED DEMENTIA TYPE: ICD-10-CM

## 2019-04-17 RX ORDER — MEMANTINE HYDROCHLORIDE 28 MG/1
CAPSULE, EXTENDED RELEASE ORAL
Qty: 30 CAPSULE | Refills: 0 | Status: SHIPPED | OUTPATIENT
Start: 2019-04-17 | End: 2019-10-08

## 2019-04-26 ENCOUNTER — PES CALL (OUTPATIENT)
Dept: ADMINISTRATIVE | Facility: CLINIC | Age: 84
End: 2019-04-26

## 2019-05-16 ENCOUNTER — PES CALL (OUTPATIENT)
Dept: ADMINISTRATIVE | Facility: CLINIC | Age: 84
End: 2019-05-16

## 2019-06-01 ENCOUNTER — PATIENT MESSAGE (OUTPATIENT)
Dept: PAIN MEDICINE | Facility: CLINIC | Age: 84
End: 2019-06-01

## 2019-06-03 DIAGNOSIS — M47.816 LUMBAR SPONDYLOSIS: ICD-10-CM

## 2019-06-03 DIAGNOSIS — M48.062 SPINAL STENOSIS OF LUMBAR REGION WITH NEUROGENIC CLAUDICATION: Primary | ICD-10-CM

## 2019-06-03 DIAGNOSIS — M51.36 DDD (DEGENERATIVE DISC DISEASE), LUMBAR: ICD-10-CM

## 2019-06-03 RX ORDER — MELOXICAM 7.5 MG/1
7.5 TABLET ORAL DAILY PRN
Qty: 30 TABLET | Refills: 2 | Status: SHIPPED | OUTPATIENT
Start: 2019-06-03 | End: 2019-09-01

## 2019-06-10 ENCOUNTER — OFFICE VISIT (OUTPATIENT)
Dept: OPHTHALMOLOGY | Facility: CLINIC | Age: 84
End: 2019-06-10
Payer: MEDICARE

## 2019-06-10 ENCOUNTER — CLINICAL SUPPORT (OUTPATIENT)
Dept: OPHTHALMOLOGY | Facility: CLINIC | Age: 84
End: 2019-06-10
Payer: MEDICARE

## 2019-06-10 DIAGNOSIS — H40.1122 PRIMARY OPEN ANGLE GLAUCOMA OF LEFT EYE, MODERATE STAGE: ICD-10-CM

## 2019-06-10 DIAGNOSIS — H40.1413 PSEUDOEXFOLIATIVE GLAUCOMA, RIGHT EYE, SEVERE STAGE: Primary | ICD-10-CM

## 2019-06-10 DIAGNOSIS — Z96.89 HISTORY OF GLAUCOMA TUBE SHUNT PROCEDURE: ICD-10-CM

## 2019-06-10 DIAGNOSIS — H35.3132 NONEXUDATIVE AGE-RELATED MACULAR DEGENERATION, BILATERAL, INTERMEDIATE DRY STAGE: ICD-10-CM

## 2019-06-10 DIAGNOSIS — H35.3231 EXUDATIVE AGE-RELATED MACULAR DEGENERATION, BILATERAL, WITH ACTIVE CHOROIDAL NEOVASCULARIZATION: ICD-10-CM

## 2019-06-10 DIAGNOSIS — Z96.1 PSEUDOPHAKIA OF BOTH EYES: ICD-10-CM

## 2019-06-10 DIAGNOSIS — H21.561 AFFERENT PUPILLARY DEFECT, RIGHT: ICD-10-CM

## 2019-06-10 DIAGNOSIS — H43.813 POSTERIOR VITREOUS DETACHMENT, BOTH EYES: ICD-10-CM

## 2019-06-10 PROCEDURE — 92133 POSTERIOR SEGMENT OCT OPTIC NERVE(OCULAR COHERENCE TOMOGRAPHY) - OU - BOTH EYES: ICD-10-PCS | Mod: 26,S$PBB,, | Performed by: OPHTHALMOLOGY

## 2019-06-10 PROCEDURE — 92083 HUMPHREY VISUAL FIELD - OU - BOTH EYES: ICD-10-PCS | Mod: 26,S$PBB,, | Performed by: OPHTHALMOLOGY

## 2019-06-10 PROCEDURE — 92014 PR EYE EXAM, EST PATIENT,COMPREHESV: ICD-10-PCS | Mod: S$PBB,,, | Performed by: OPHTHALMOLOGY

## 2019-06-10 PROCEDURE — 99999 PR PBB SHADOW E&M-EST. PATIENT-LVL II: CPT | Mod: PBBFAC,,, | Performed by: OPHTHALMOLOGY

## 2019-06-10 PROCEDURE — 92083 EXTENDED VISUAL FIELD XM: CPT | Mod: PBBFAC | Performed by: OPHTHALMOLOGY

## 2019-06-10 PROCEDURE — 99212 OFFICE O/P EST SF 10 MIN: CPT | Mod: PBBFAC,25 | Performed by: OPHTHALMOLOGY

## 2019-06-10 PROCEDURE — 92014 COMPRE OPH EXAM EST PT 1/>: CPT | Mod: S$PBB,,, | Performed by: OPHTHALMOLOGY

## 2019-06-10 PROCEDURE — 92133 CPTRZD OPH DX IMG PST SGM ON: CPT | Mod: PBBFAC | Performed by: OPHTHALMOLOGY

## 2019-06-10 PROCEDURE — 99999 PR PBB SHADOW E&M-EST. PATIENT-LVL II: ICD-10-PCS | Mod: PBBFAC,,, | Performed by: OPHTHALMOLOGY

## 2019-06-10 NOTE — PROGRESS NOTES
OCT/HVF done ;rel/fix/poor od good os /eye movement ou/coop. Fair/chart checked for latex allergy.-[facility]

## 2019-06-10 NOTE — PROGRESS NOTES
HPI     DLS: 2/04/19    Pt here for HVF review;    Meds: Ophthalmic lubricant 1-2x daily prn ou    1. PsEx OD   2. POAG OS   3. PVD OU   4. Exudative ARMD OU   5. APD OD   6. WILLIAM   7. PCIOL OU   8. Ptosis BRIAN   9. Yag Cap OD   10. S/P ahmed od - 3/2/2016         Last edited by Briseida Mendoza on 6/10/2019 10:13 AM. (History)              Assessment /Plan     For exam results, see Encounter Report.    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 Open-angle glaucoma, moderate stage   PsEx - moderate stage OD // POAG - mild stage os    First HVF   2013   First photos   5/2014   Treatment / Drops started   combigan, travatan (started in [place] after [place])            Family history    + both parents        Glaucoma meds    Off all gtts  (use to use Combigan od , travatan od )         H/O adverse rxn to glaucoma drops    Stopped combigan 2/2 low BP // stopped PG's 2/2 wet armd // stopped dorzolamide 2/2 dermatitis (( allergic conj od 2/2 alphagan P and/or willard)         LASERS    SLT od 10/15/2015 - no response (IOP went up)         GLAUCOMA SURGERIES    Ahmed OD 3/2/2016        OTHER EYE SURGERIES    CE/IOL OU        CDR    0.9/0.4        Tbase    10-19/10 - 16          Tmax     19/16 (on gtts)            Ttarget    17/17             HVF    4 test 2013 to  5/2017 - SAD od // gen dep - new  Os                   SWITCH TO 24-2 STIM V OD - 1 TEST  5/2017 to 5/2017 - SAD         Gonio    +3-4 OU        CCT    519/528        OCT    3 test 2014 to 2019 - RNFL - dec G/TI, bord TSod // wnl os        HRT    5 test 2014 to 2019 - MR -  Dec TS, NS bord G, T, TI od // bord TI/NI. I os /// CDR 0.745 od // 0.55 os         Disc photos    2014 - OIS    - Ttoday  14 /14   (( s/p ahmed os and no gtts od)   - Test today: HVF / DFE / OCT    2. Posterior vitreous detachment, both eyes      3. Bilateral exudative age-related macular degeneration s/p avastin ou  Last avastin 11/24/2015 - ou - M[facility] //  -  saw mazzulla 12/7/2018      4 Ptosis OS  S/P lid lift ou   Still with mild ptosis OS     5. +APD OD - mild     6. WILLIAM  Uses systane      7.  Pseudophakia OU  Done in [place] after [place]      S/P yag cap od        Plan:  H/O red / irritated eys   Resolved off all gtts and IOP is still ok post ahmed   IOP ok  od off all gtts - had ahmed od 3/2/2016 - IOP ok post op   Intolerant to all glaucoma gtts       PsExGl od - severe / POAG os - mild  - very assymetric c/d OU- mildly thin corneas- open on gonio- +family history  Patient was started on glaucoma treatment after [place]- went to [place] after [place]- now back here  Was seen by Dr. Blake ([facility] [place])  and Dr. Ontiveros and sent for further glaucoma eval  HVF show SAD OU however appears to have worsened OS- patient with significant ptosis OS- could be lid artifact?-   Optic disc OS does not appear to be as bad as HVF shows- looks very healthy    ++ APD od - old    Seeing [facility] - PED - s/p avastin x 2 ou - keep F/u appt for ? More avastin   VA os improving  + armd - see OCT done 4/7/2015 // PED w/ SR fluid ou   S/P last avastin 1/26/2016 (VA was poor od at 20/200E@6' and much better os at that visit)   Last visit with [facility] 12/7/2018 - is to F/U  in 6 months       S/P  ahmed implant od 3/2/2016   Intol to all gtts   Off PG's per Mazzulla  Intol to dorzolamide - dermatitis  Intol to alphagan - marked hyperemia  Intol to willard - hypermia   -intol to BB - low BP and HR     s/p GDD / ahmed od to try and control IOP od on 3/2/16 ++ PsExGl)   -Pressure doing well today at 16 OD    Currently on no drops      F/U 4 months - with iop CHECK - gonio     Saw Boudreax 8/2016 - no need for glasses     Keep F/u with beth - monitor armd - in good left eye

## 2019-06-20 ENCOUNTER — TELEPHONE (OUTPATIENT)
Dept: FAMILY MEDICINE | Facility: CLINIC | Age: 84
End: 2019-06-20

## 2019-06-20 DIAGNOSIS — I95.9 HYPOTENSION, UNSPECIFIED HYPOTENSION TYPE: ICD-10-CM

## 2019-06-20 NOTE — TELEPHONE ENCOUNTER
----- Message from Yudith Kidd sent at 6/20/2019  9:48 AM CDT -----  Pt. Needs refill on Midodrine 10mg tabs, pls call Jina [phone], pt is out as of today.Pls call patient if need more info.

## 2019-06-21 RX ORDER — MIDODRINE HYDROCHLORIDE 10 MG/1
10 TABLET ORAL 3 TIMES DAILY
Qty: 270 TABLET | Refills: 1 | Status: SHIPPED | OUTPATIENT
Start: 2019-06-21 | End: 2019-10-10 | Stop reason: SDUPTHER

## 2019-06-21 RX ORDER — MIDODRINE HYDROCHLORIDE 10 MG/1
TABLET ORAL
Qty: 270 TABLET | Refills: 0 | OUTPATIENT
Start: 2019-06-21

## 2019-06-26 ENCOUNTER — PES CALL (OUTPATIENT)
Dept: ADMINISTRATIVE | Facility: CLINIC | Age: 84
End: 2019-06-26

## 2019-07-01 ENCOUNTER — OFFICE VISIT (OUTPATIENT)
Dept: PAIN MEDICINE | Facility: CLINIC | Age: 84
End: 2019-07-01
Payer: MEDICARE

## 2019-07-01 ENCOUNTER — PATIENT OUTREACH (OUTPATIENT)
Dept: ADMINISTRATIVE | Facility: HOSPITAL | Age: 84
End: 2019-07-01

## 2019-07-01 VITALS
RESPIRATION RATE: 18 BRPM | SYSTOLIC BLOOD PRESSURE: 136 MMHG | BODY MASS INDEX: 27.43 KG/M2 | WEIGHT: 154.81 LBS | OXYGEN SATURATION: 97 % | HEART RATE: 75 BPM | DIASTOLIC BLOOD PRESSURE: 63 MMHG | HEIGHT: 63 IN

## 2019-07-01 DIAGNOSIS — M51.36 DDD (DEGENERATIVE DISC DISEASE), LUMBAR: Primary | ICD-10-CM

## 2019-07-01 DIAGNOSIS — M54.16 LUMBAR RADICULOPATHY: ICD-10-CM

## 2019-07-01 DIAGNOSIS — M47.816 LUMBAR SPONDYLOSIS: ICD-10-CM

## 2019-07-01 DIAGNOSIS — M48.062 SPINAL STENOSIS OF LUMBAR REGION WITH NEUROGENIC CLAUDICATION: ICD-10-CM

## 2019-07-01 PROCEDURE — 99999 PR PBB SHADOW E&M-EST. PATIENT-LVL III: CPT | Mod: PBBFAC,,, | Performed by: PAIN MEDICINE

## 2019-07-01 PROCEDURE — 99214 OFFICE O/P EST MOD 30 MIN: CPT | Mod: S$PBB,,, | Performed by: PAIN MEDICINE

## 2019-07-01 PROCEDURE — 99214 PR OFFICE/OUTPT VISIT, EST, LEVL IV, 30-39 MIN: ICD-10-PCS | Mod: S$PBB,,, | Performed by: PAIN MEDICINE

## 2019-07-01 PROCEDURE — 99999 PR PBB SHADOW E&M-EST. PATIENT-LVL III: ICD-10-PCS | Mod: PBBFAC,,, | Performed by: PAIN MEDICINE

## 2019-07-01 PROCEDURE — 99213 OFFICE O/P EST LOW 20 MIN: CPT | Mod: PBBFAC,PN | Performed by: PAIN MEDICINE

## 2019-07-01 RX ORDER — GABAPENTIN 300 MG/1
600 CAPSULE ORAL 3 TIMES DAILY
Qty: 180 CAPSULE | Refills: 11 | Status: SHIPPED | OUTPATIENT
Start: 2019-07-01 | End: 2020-08-11 | Stop reason: SDUPTHER

## 2019-07-01 NOTE — PROGRESS NOTES
Subjective:     Patient ID: Elda Cui is a 84 y.o. female    Chief Complaint: Follow-up (Lower back pain)      Referred by: No ref. provider found      HPI:    Interval History (7/1/19):  She returns today for follow up.  She reports that back pain has returned.  She denies any changes in the quality or location of this pain. She feels that his pain may be worsened intensity than previously.  She is interested in a repeat epidural steroid injection if appropriate.      Interval History (4/11/19):  She returns today for follow up.  She reports that L4-5 interlaminar epidural steroid injection has been helpful for the low back and lower extremity pain.  She reports 100% relief immediately following the procedure for a few days.  The degree of relief has since slightly decreased and she is currently reporting 80% relief.  She denies any new or worsening symptoms.      Interval History (3/25/19):  She returns today for follow up and MRI review.  She reports that her pain is unchanged in quality and location since last encounter.  Outside records show that patient underwent multiple right sacroiliac joint steroid injections.  Patient states that these did provide mild to moderately foot for a very short period of time. She also underwent a right lumbar RFA in December of 2018.  This provided greater than 80% relief but also for a very short time.      Initial Encounter (3/11/19):  Elda Cui is a 84 y.o. female who presents today with chronic right-sided low back pain. This pain has been present for about 3 years.  No specific inciting event or injury noted.  Pain is located in the right lumbosacral region and radiates to right buttock and posterior thigh.  It does not cross the knee.  She denies any associated numbness, tingling, weakness or bowel or bladder dysfunction associated with the pain. The pain is not present while at rest.  She can only stand or walk for short periods of time  before the pain becomes quite bothersome.  The pain resolves within a few minutes after sitting.  She has been previously treated by Dr. Petersen and has undergone multiple interventional procedures.  These include but may not be limited to right L4-5 and L5-S1 facet injections, right sacroiliac joint injection, as well as right lumbar are phase.  Patient states that these procedures did provide some relief initially but not for very long time.  We do not have complete records regarding her previous treatments at this time.   This pain is described in detail below.    Physical Therapy:  Yes.  Not effective    Non-pharmacologic Treatment:  Rest helps         · TENS?  No    Pain Medications:         · Currently taking:  Nothing    · Has tried in the past:  Tylenol, NSAIDs    · Has not tried:  Opioids, Muscle relaxants, TCAs, SNRIs, anticonvulsants, topical creams    Blood thinners:  None    Interventional Therapies:    Previous right sacroiliac joint steroid injections and right lumbar RFA performed by Dr. Petersen.   3/28/19 - L4-5 interlaminar epidural steroid injection - 80% relief    Relevant Surgeries:  None    Affecting sleep?  Yes    Affecting daily activities? yes    Depressive symptoms? no          · SI/HI? No    Work status: Retired    Pain Scores:    Best:       2/10  Worst:     8/10  Usually:   7/10  Today:    10/10    Review of Systems   Constitutional: Negative for activity change, appetite change, chills, fatigue, fever and unexpected weight change.   HENT: Negative for hearing loss.    Eyes: Negative for visual disturbance.   Respiratory: Negative for chest tightness and shortness of breath.    Cardiovascular: Negative for chest pain.   Gastrointestinal: Negative for abdominal pain, constipation, diarrhea, nausea and vomiting.   Genitourinary: Negative for difficulty urinating.   Musculoskeletal: Positive for back pain, gait problem and myalgias. Negative for neck pain.   Skin: Negative for rash.    Neurological: Negative for dizziness, weakness, light-headedness, numbness and headaches.   Psychiatric/Behavioral: Positive for sleep disturbance. Negative for hallucinations and suicidal ideas. The patient is not nervous/anxious.        Past Medical History:   Diagnosis Date    Allergy     Bilateral nonexudative age-related macular degeneration 5/2/2014    Blood transfusion     Glaucoma     Hyperlipidemia     Joint pain     Lumbar spondylosis 3/25/2019    Memory loss     Pseudophakia of both eyes 5/20/2014    Squamous Cell Carcinoma 6/13/13    in situ, excised from L cheek    Syncope 1/7/2014    Ulcerative colitis        Past Surgical History:   Procedure Laterality Date    AHMED GLAUCOMA IMPLANT Right 3/2/16        APPENDECTOMY      CATARACT EXTRACTION W/  INTRAOCULAR LENS IMPLANT Bilateral n/a    Done in [place]    CHOLECYSTECTOMY      COLON SURGERY      COLONOSCOPY  [facility]s  [facility] ([facility])    Benign polyps removed.    COSMETIC SURGERY      CT COLOGRAPHY  8/31/2007   ([facility])    Sigmoid diverticulosis.    EYE SURGERY  2011    FLEXIBLE SIGMOIDOSCOPY  8/31/2007  [facility] ([facility])    Unable to complete, had CT colography.    GALLBLADDER SURGERY      Injection, Steroid, Epidural N/A 3/28/2019    Performed by Anders Henry Jr., [facility] at [facility]    INSERTION-IMPLANT-GLAUCOMA/AHMED Right 3/2/2016    Performed by Charlene Leger MD at [facility] OR [address] [place] [address]    NASAL SEPTUM SURGERY      TONSILLECTOMY      TOTAL ABDOMINAL HYSTERECTOMY W/ BILATERAL SALPINGOOPHORECTOMY         Social History     Socioeconomic History    Marital status:      Spouse name: Not on file    Number of children: Not on file    Years of education: Not on file    Highest education level: Not on file   Occupational History    Not on file   Social Needs    Financial resource strain: Not on file    Food insecurity:     Worry: Not on file     Inability: Not on file    Transportation  "needs:     Medical: Not on file     Non-medical: Not on file   Tobacco Use    Smoking status: Former Smoker     Last attempt to quit: 10/2/1974     Years since quittin.7    Smokeless tobacco: Never Used   Substance and Sexual Activity    Alcohol use: Yes     Alcohol/week: 0.0 oz     Comment: Social    Drug use: No    Sexual activity: Yes     Partners: Male     Birth control/protection: Surgical   Lifestyle    Physical activity:     Days per week: Not on file     Minutes per session: Not on file    Stress: Not on file   Relationships    Social connections:     Talks on phone: Not on file     Gets together: Not on file     Attends Restorationist service: Not on file     Active member of club or organization: Not on file     Attends meetings of clubs or organizations: Not on file     Relationship status: Not on file   Other Topics Concern    Are you pregnant or think you may be? No    Breast-feeding No   Social History Narrative    Not on file       Review of patient's allergies indicates:   Allergen Reactions    Doxycycline Nausea And Vomiting       Current Outpatient Medications on File Prior to Visit   Medication Sig Dispense Refill    donepezil (ARICEPT) 10 MG tablet Take 1 tablet (10 mg total) by mouth every evening. 30 tablet 11    glucosamine-chondroitin 500-400 mg tablet Take 1 tablet by mouth 3 (three) times daily.      meloxicam (MOBIC) 7.5 MG tablet Take 1 tablet (7.5 mg total) by mouth daily as needed for Pain. 30 tablet 2    memantine (NAMENDA XR) 28 mg CSpX TAKE 1 CAPSULE(28 MG) BY MOUTH EVERY DAY 30 capsule 0    midodrine (PROAMATINE) 10 MG tablet Take 1 tablet (10 mg total) by mouth 3 (three) times daily. 270 tablet 1     No current facility-administered medications on file prior to visit.        Objective:      /63   Pulse 75   Resp 18   Ht 5' 3" (1.6 m)   Wt 70.2 kg (154 lb 12.8 oz)   SpO2 97%   BMI 27.42 kg/m²     Exam:  GEN:  Well developed, well nourished.  No acute " distress.   HEENT:  No trauma.  Mucous membranes moist.  Nares patent bilaterally.  PSYCH: Normal affect. Thought content appropriate.  CHEST:  Breathing symmetric.  No audible wheezing.  ABD: Soft, non-distended.  SKIN:  Warm, pink, dry.  No rash on exposed areas.    EXT:  No cyanosis, clubbing, or edema.  No color change or changes in nail or hair growth.  NEURO/MUSCULOSKELETAL:  Fully alert, oriented, and appropriate. Speech normal mahnaz. No cranial nerve deficits.   Gait:  Normal.  No focal motor deficits.       Imaging:  Narrative     EXAMINATION:  MRI LUMBAR SPINE WITHOUT CONTRAST    CLINICAL HISTORY:  lumbar spinal stenosis; Other intervertebral disc degeneration, lumbar region    TECHNIQUE:  Multiplanar, multisequence MR images were acquired from the thoracolumbar junction to the sacrum without the administration of contrast.    COMPARISON:  None.    FINDINGS:  The distal cord/conus demonstrates normal size and signal.  No evidence of osteomyelitis, marrow replacement process, or acute fracture.  Probable small left renal cyst.  No paraspinal masses.    At T12-L1, there is a small right paracentral disc protrusion.  No spinal canal stenosis.    At L1-2, there is mild disc bulging and facet hypertrophy, resulting in mild spinal canal stenosis.  No significant neural foraminal narrowing.    At L2-3, there is asymmetric disc bulging to the left with facet hypertrophy, resulting in mild spinal canal stenosis, moderate left and mild right-sided neural foraminal narrowing.    At L3-4, there is moderate disc bulging and facet hypertrophy, resulting in mild spinal canal stenosis, and moderate left and mild right-sided neural foraminal narrowing.    At L4-5, there is prominent facet arthropathy.  There is mild disc bulging.  This results in moderate left and mild right-sided neural foraminal narrowing.  No spinal canal stenosis.    At L5-S1, there is there is prominent degenerative disc disease, noting disc height  loss and sub endplate marrow edema.  There is asymmetric disc bulging to the right.  This results in severe right and moderate left-sided neural foraminal narrowing.  No spinal canal stenosis.   Impression       Lumbar spondylosis, resulting in mild spinal canal stenosis from L1-2 thru L3-4, and moderate/ severe neural foraminal narrowing from L2-3 through L5-S1, as above.      Electronically signed by: Chris Farias MD  Date: 03/15/2019  Time: 10:29    Encounter     View Encounter                   Assessment:       Encounter Diagnoses   Name Primary?    DDD (degenerative disc disease), lumbar Yes    Lumbar spondylosis     Spinal stenosis of lumbar region with neurogenic claudication          Plan:       Elda was seen today for follow-up.    Diagnoses and all orders for this visit:    DDD (degenerative disc disease), lumbar  -     gabapentin (NEURONTIN) 300 MG capsule; Take 2 capsules (600 mg total) by mouth 3 (three) times daily.    Lumbar spondylosis  -     gabapentin (NEURONTIN) 300 MG capsule; Take 2 capsules (600 mg total) by mouth 3 (three) times daily.    Spinal stenosis of lumbar region with neurogenic claudication  -     gabapentin (NEURONTIN) 300 MG capsule; Take 2 capsules (600 mg total) by mouth 3 (three) times daily.        Elda Cui is a 84 y.o. female with chronic right-sided low back pain.  Possible etiologies include spinal stenosis and facet/sacroiliac joint pathology. Very good relief following interlaminar epidural steroid injection.    1.  Schedule for L4-5 interlaminar epidural steroid injection.  2.  Start gabapentin 300 mg q.h.s..  Gradually increase to 600 mg t.i.d. as tolerated/needed.  Patient was given instructions on how to do this.  3.  Return to clinic 2 weeks after procedure discuss results.

## 2019-07-02 NOTE — PROGRESS NOTES
Ms. Cui will be scheduled for L4-5 SHARDA on 07/24/2019.  Reviewed the pre-procedure instructions listed below with her- copy also provided.  Instructed patient to notify clinic if she begins feeling ill, runs a fever, is prescribed antibiotics, and/or has any outpatient procedures within the two weeks leading up to this procedure.  Instructed patient to report to [facility], 2nd Floor Endoscopy Registration Desk.  All questions answered- patient verbalized understanding.     Day of Procedure  - Ensure you have obtained an arrival time from the Pain Management Staff  o Procedure Area will call 1-3 days in advance with your arrival time.  Please check any voicemails received.  o If you arrive past your scheduled procedure time, you may be asked to reschedule your procedure.  - Ensure you have a  with you to remain present throughout your procedure for your safety.  o If you arrive without a responsible adult to stay with you and drive you home, you may be asked to reschedule your procedure.  - Take all of your prescribed medications (exceptions noted below) with a small amount of water  - This is NOT a fasting procedure, you may have a light meal before coming.  - Wear comfortable clothing (loose fitting pants).  - You may wear glasses, dentures, contact lenses, and/or hearing aids. Please remove all jewelry and metal hairpins.  - Notify the nurse during the intake process if you are allergic to any medications, if you are diabetic, or if you are not feeling well  - Contact the Pain Management Clinic with the following:  o A fever greater than 100° (degrees)   o Feel ill, have any type of infection, or are taking antibiotics now or within the two (2) weeks leading up to this procedure  o Have any outpatient procedures within the two (2) weeks leading up to this procedure (colonoscopy, major dental work, etc.)    IF you are taking blood thinners: Only upon receiving clearance and notification  from the Pain Management Department  7 Days Prior to Your Procedure:  - Stop taking Plavix/Clopridogrel, Effient/Prasugel, ASA  5 Days Prior to Your Procedure:  - Stop taking Coumadin/Warfarin.  An INR may be drawn prior to your procedure.  If labs are required, you will need to arrive earlier than your scheduled arrival time.  - Stop taking Pradaxa/Dabigatra,  Brilinta/ Ticagrelor  3 Days Prior to Your Procedure:  - Stop taking Xarelto/Rivaroxaban, Eliquis/Apixaban, Aggrenox/Dipyridamole, Reopro/Abciximab

## 2019-07-15 ENCOUNTER — OFFICE VISIT (OUTPATIENT)
Dept: FAMILY MEDICINE | Facility: CLINIC | Age: 84
End: 2019-07-15
Payer: MEDICARE

## 2019-07-15 VITALS
OXYGEN SATURATION: 95 % | BODY MASS INDEX: 27.03 KG/M2 | HEART RATE: 67 BPM | TEMPERATURE: 98 F | RESPIRATION RATE: 16 BRPM | WEIGHT: 152.56 LBS | DIASTOLIC BLOOD PRESSURE: 76 MMHG | HEIGHT: 63 IN | SYSTOLIC BLOOD PRESSURE: 132 MMHG

## 2019-07-15 DIAGNOSIS — E78.5 HYPERLIPIDEMIA, UNSPECIFIED HYPERLIPIDEMIA TYPE: Primary | ICD-10-CM

## 2019-07-15 PROCEDURE — 99999 PR PBB SHADOW E&M-EST. PATIENT-LVL IV: ICD-10-PCS | Mod: PBBFAC,,, | Performed by: FAMILY MEDICINE

## 2019-07-15 PROCEDURE — 99214 OFFICE O/P EST MOD 30 MIN: CPT | Mod: S$PBB,,, | Performed by: FAMILY MEDICINE

## 2019-07-15 PROCEDURE — 99999 PR PBB SHADOW E&M-EST. PATIENT-LVL IV: CPT | Mod: PBBFAC,,, | Performed by: FAMILY MEDICINE

## 2019-07-15 PROCEDURE — 99214 OFFICE O/P EST MOD 30 MIN: CPT | Mod: PBBFAC,PO | Performed by: FAMILY MEDICINE

## 2019-07-15 PROCEDURE — 99214 PR OFFICE/OUTPT VISIT, EST, LEVL IV, 30-39 MIN: ICD-10-PCS | Mod: S$PBB,,, | Performed by: FAMILY MEDICINE

## 2019-07-15 NOTE — PROGRESS NOTES
Subjective:       Patient ID: Elda Cui     Chief Complaint: Hyperlipidemia      Bety Cui is a 84 y.o. female. Here for follow up uncontrolled HLD.  Patient not on statin therapy.  She does watch her fat intake in diet.  She walks daily for 30 minutes.    Review of patient's allergies indicates:   Allergen Reactions    Doxycycline Nausea And Vomiting       Current Outpatient Medications:     donepezil (ARICEPT) 10 MG tablet, Take 1 tablet (10 mg total) by mouth every evening., Disp: 30 tablet, Rfl: 11    gabapentin (NEURONTIN) 300 MG capsule, Take 2 capsules (600 mg total) by mouth 3 (three) times daily., Disp: 180 capsule, Rfl: 11    meloxicam (MOBIC) 7.5 MG tablet, Take 1 tablet (7.5 mg total) by mouth daily as needed for Pain., Disp: 30 tablet, Rfl: 2    memantine (NAMENDA XR) 28 mg CSpX, TAKE 1 CAPSULE(28 MG) BY MOUTH EVERY DAY, Disp: 30 capsule, Rfl: 0    midodrine (PROAMATINE) 10 MG tablet, Take 1 tablet (10 mg total) by mouth 3 (three) times daily., Disp: 270 tablet, Rfl: 1    Past Medical History:   Diagnosis Date    Allergy     Bilateral nonexudative age-related macular degeneration 5/2/2014    Blood transfusion     Glaucoma     Hyperlipidemia     Joint pain     Lumbar spondylosis 3/25/2019    Memory loss     Pseudophakia of both eyes 5/20/2014    Squamous Cell Carcinoma 6/13/13    in situ, excised from L cheek    Syncope 1/7/2014    Ulcerative colitis      Review of Systems   Respiratory: Negative for shortness of breath.    Cardiovascular: Negative for chest pain.   Musculoskeletal: Positive for back pain.       Objective:      Physical Exam   Constitutional: She appears well-developed and well-nourished.   HENT:   Head: Normocephalic and atraumatic.   Neck: Normal range of motion. Neck supple.   Cardiovascular: Normal rate and regular rhythm.   Pulmonary/Chest: Effort normal and breath sounds normal.   Abdominal: Soft. Bowel sounds are normal. There  is no tenderness.   Musculoskeletal: She exhibits no edema or deformity.   Neurological: She is alert.   Skin: Skin is warm and dry.   Psychiatric: She has a normal mood and affect.      Assessment:       1. Hyperlipidemia, unspecified hyperlipidemia type        Plan:       Elda was seen today for hyperlipidemia.    Diagnoses and all orders for this visit:    Hyperlipidemia, unspecified hyperlipidemia type  -     Lipid panel; Future  Recommend low cholesterol diet, increased exercise, and omega fatty 3 acids (fish oil tabs) over the counter to lower cholesterol.

## 2019-07-16 ENCOUNTER — LAB VISIT (OUTPATIENT)
Dept: LAB | Facility: HOSPITAL | Age: 84
End: 2019-07-16
Attending: FAMILY MEDICINE
Payer: MEDICARE

## 2019-07-16 DIAGNOSIS — E78.5 HYPERLIPIDEMIA, UNSPECIFIED HYPERLIPIDEMIA TYPE: ICD-10-CM

## 2019-07-16 LAB
ALBUMIN SERPL BCP-MCNC: 4 G/DL (ref 3.5–5.2)
ALP SERPL-CCNC: 72 U/L (ref 55–135)
ALT SERPL W/O P-5'-P-CCNC: 11 U/L (ref 10–44)
ANION GAP SERPL CALC-SCNC: 9 MMOL/L (ref 8–16)
AST SERPL-CCNC: 18 U/L (ref 10–40)
BILIRUB SERPL-MCNC: 0.3 MG/DL (ref 0.1–1)
BUN SERPL-MCNC: 18 MG/DL (ref 8–23)
CALCIUM SERPL-MCNC: 10.5 MG/DL (ref 8.7–10.5)
CHLORIDE SERPL-SCNC: 100 MMOL/L (ref 95–110)
CHOLEST SERPL-MCNC: 338 MG/DL (ref 120–199)
CHOLEST/HDLC SERPL: 6.8 {RATIO} (ref 2–5)
CO2 SERPL-SCNC: 31 MMOL/L (ref 23–29)
CREAT SERPL-MCNC: 0.9 MG/DL (ref 0.5–1.4)
EST. GFR  (AFRICAN AMERICAN): >60 ML/MIN/1.73 M^2
EST. GFR  (NON AFRICAN AMERICAN): 58.9 ML/MIN/1.73 M^2
GLUCOSE SERPL-MCNC: 105 MG/DL (ref 70–110)
HDLC SERPL-MCNC: 50 MG/DL (ref 40–75)
HDLC SERPL: 14.8 % (ref 20–50)
LDLC SERPL CALC-MCNC: 215.8 MG/DL (ref 63–159)
NONHDLC SERPL-MCNC: 288 MG/DL
POTASSIUM SERPL-SCNC: 4.5 MMOL/L (ref 3.5–5.1)
PROT SERPL-MCNC: 7.5 G/DL (ref 6–8.4)
SODIUM SERPL-SCNC: 140 MMOL/L (ref 136–145)
TRIGL SERPL-MCNC: 361 MG/DL (ref 30–150)

## 2019-07-16 PROCEDURE — 80053 COMPREHEN METABOLIC PANEL: CPT

## 2019-07-16 PROCEDURE — 80061 LIPID PANEL: CPT

## 2019-07-16 PROCEDURE — 36415 COLL VENOUS BLD VENIPUNCTURE: CPT | Mod: PO

## 2019-07-18 ENCOUNTER — TELEPHONE (OUTPATIENT)
Dept: PAIN MEDICINE | Facility: CLINIC | Age: 84
End: 2019-07-18

## 2019-07-18 NOTE — TELEPHONE ENCOUNTER
Message left with review of instructions, as well as arrival time of 1300.  Information also sent to [employer].

## 2019-07-22 ENCOUNTER — OFFICE VISIT (OUTPATIENT)
Dept: OPHTHALMOLOGY | Facility: CLINIC | Age: 84
End: 2019-07-22
Payer: MEDICARE

## 2019-07-22 DIAGNOSIS — H43.813 POSTERIOR VITREOUS DETACHMENT, BOTH EYES: ICD-10-CM

## 2019-07-22 DIAGNOSIS — H35.3132 NONEXUDATIVE AGE-RELATED MACULAR DEGENERATION, BILATERAL, INTERMEDIATE DRY STAGE: ICD-10-CM

## 2019-07-22 DIAGNOSIS — H35.3211 EXUDATIVE AGE-RELATED MACULAR DEGENERATION OF RIGHT EYE WITH ACTIVE CHOROIDAL NEOVASCULARIZATION: Primary | ICD-10-CM

## 2019-07-22 PROCEDURE — 67028 INJECTION EYE DRUG: CPT | Mod: PBBFAC,RT | Performed by: OPHTHALMOLOGY

## 2019-07-22 PROCEDURE — 99999 PR PBB SHADOW E&M-EST. PATIENT-LVL III: ICD-10-PCS | Mod: PBBFAC,,, | Performed by: OPHTHALMOLOGY

## 2019-07-22 PROCEDURE — 99213 OFFICE O/P EST LOW 20 MIN: CPT | Mod: PBBFAC,25 | Performed by: OPHTHALMOLOGY

## 2019-07-22 PROCEDURE — 92134 POSTERIOR SEGMENT OCT RETINA (OCULAR COHERENCE TOMOGRAPHY)-BOTH EYES: ICD-10-PCS | Mod: 26,S$PBB,, | Performed by: OPHTHALMOLOGY

## 2019-07-22 PROCEDURE — 67028 INJECTION EYE DRUG: CPT | Mod: S$PBB,RT,, | Performed by: OPHTHALMOLOGY

## 2019-07-22 PROCEDURE — 92014 PR EYE EXAM, EST PATIENT,COMPREHESV: ICD-10-PCS | Mod: 25,S$PBB,, | Performed by: OPHTHALMOLOGY

## 2019-07-22 PROCEDURE — 99999 PR PBB SHADOW E&M-EST. PATIENT-LVL III: CPT | Mod: PBBFAC,,, | Performed by: OPHTHALMOLOGY

## 2019-07-22 PROCEDURE — C9257 BEVACIZUMAB INJECTION: HCPCS | Performed by: OPHTHALMOLOGY

## 2019-07-22 PROCEDURE — 92134 CPTRZ OPH DX IMG PST SGM RTA: CPT | Mod: PBBFAC | Performed by: OPHTHALMOLOGY

## 2019-07-22 PROCEDURE — 67028 PR INJECT INTRAVITREAL PHARMCOLOGIC: ICD-10-PCS | Mod: S$PBB,RT,, | Performed by: OPHTHALMOLOGY

## 2019-07-22 PROCEDURE — 92014 COMPRE OPH EXAM EST PT 1/>: CPT | Mod: 25,S$PBB,, | Performed by: OPHTHALMOLOGY

## 2019-07-22 RX ADMIN — BEVACIZUMAB 1.25 MG: 100 INJECTION, SOLUTION INTRAVENOUS at 12:07

## 2019-07-22 NOTE — PATIENT INSTRUCTIONS

## 2019-07-22 NOTE — PROGRESS NOTES
HPI     Concerns About Ocular Health      Additional comments: Macular              Comments     Pt here for 6 mo f/u.  DLS:12/07/2018 Dr. Macias.    Pt states no change in vision since last visit.  No eye pain, flashes or floaters.    Eye meds:  Ophthalmic lubricant 1-2x daily prn OU          OCT - Central PED's OU  OD - increase in trace SRF and IRF  , OS - stable no SRF    Previous FA - OD - Late CME leakage  OS - minimal late PED leakage    1. AMD OU  - History of drusenoid PED's  - 4/15 - developed foveal SRF OU  - OCT today stable  - S/p Avastin OD x 10, OS x 9     - Also with subfoveal RPE loss - may not improve with injections  - AREDS/AG  - CME component OD - DC travatan (06/2015)   7/19 - small increase in fluid OD    Avastin OD    2. PCIOL OU    3. Floaters OU    4. POAG   - Off all drops per Dr. Leger, continue f/u as scheduled   - S/p Ahmed valve OD on 3/02/16    5. WILLIAM   - AT 3-4x/day      1 months OCT      Risks, benefits, and alternatives to treatment discussed in detail with the patient.  The patient voiced understanding and wished to proceed with the procedure    Injection Procedure Note:  Diagnosis: Wet AMD OD    Patient Identified and Time Out complete  Topical Proparacaine and Betadine.  Inject Avastin OD at 6:00 @ 3.5-4mm posterior to limbus  Post Operative Dx: Same  Complications: None  Follow up as above.

## 2019-07-23 ENCOUNTER — TELEPHONE (OUTPATIENT)
Dept: FAMILY MEDICINE | Facility: CLINIC | Age: 84
End: 2019-07-23

## 2019-07-23 NOTE — TELEPHONE ENCOUNTER
----- Message from Cindi Hendricks MD sent at 7/23/2019  9:15 AM CDT -----  Cholesterol panel very high, higher than previous year.  Please verify if patient would like to start medication.

## 2019-07-23 NOTE — TELEPHONE ENCOUNTER
Pt returned call; informed of results and recommendations; pt ok with starting medication for cholesterol

## 2019-07-24 ENCOUNTER — HOSPITAL ENCOUNTER (OUTPATIENT)
Facility: HOSPITAL | Age: 84
Discharge: HOME | End: 2019-07-24
Attending: PAIN MEDICINE | Admitting: PAIN MEDICINE
Payer: MEDICARE

## 2019-07-24 ENCOUNTER — HOSPITAL ENCOUNTER (OUTPATIENT)
Dept: RADIOLOGY | Facility: HOSPITAL | Age: 84
Discharge: HOME | End: 2019-07-24
Attending: PAIN MEDICINE
Payer: MEDICARE

## 2019-07-24 VITALS
HEART RATE: 66 BPM | OXYGEN SATURATION: 98 % | BODY MASS INDEX: 27.29 KG/M2 | TEMPERATURE: 98 F | SYSTOLIC BLOOD PRESSURE: 158 MMHG | RESPIRATION RATE: 18 BRPM | WEIGHT: 154 LBS | DIASTOLIC BLOOD PRESSURE: 78 MMHG | HEIGHT: 63 IN

## 2019-07-24 DIAGNOSIS — M51.36 DDD (DEGENERATIVE DISC DISEASE), LUMBAR: ICD-10-CM

## 2019-07-24 DIAGNOSIS — M51.36 DDD (DEGENERATIVE DISC DISEASE), LUMBAR: Primary | ICD-10-CM

## 2019-07-24 PROCEDURE — 25500020 PHARM REV CODE 255: Performed by: PAIN MEDICINE

## 2019-07-24 PROCEDURE — 62323 PR INJ LUMBAR/SACRAL, W/IMAGING GUIDANCE: ICD-10-PCS | Mod: ,,, | Performed by: PAIN MEDICINE

## 2019-07-24 PROCEDURE — 25000003 PHARM REV CODE 250: Performed by: PAIN MEDICINE

## 2019-07-24 PROCEDURE — 62323 NJX INTERLAMINAR LMBR/SAC: CPT | Mod: ,,, | Performed by: PAIN MEDICINE

## 2019-07-24 PROCEDURE — 63600175 PHARM REV CODE 636 W HCPCS: Performed by: PAIN MEDICINE

## 2019-07-24 PROCEDURE — 62323 NJX INTERLAMINAR LMBR/SAC: CPT | Performed by: PAIN MEDICINE

## 2019-07-24 PROCEDURE — 64493 INJ PARAVERT F JNT L/S 1 LEV: CPT | Performed by: PAIN MEDICINE

## 2019-07-24 RX ORDER — LIDOCAINE HYDROCHLORIDE 20 MG/ML
10 INJECTION, SOLUTION INFILTRATION; PERINEURAL ONCE
Status: COMPLETED | OUTPATIENT
Start: 2019-07-24 | End: 2019-07-24

## 2019-07-24 RX ORDER — DEXAMETHASONE SODIUM PHOSPHATE 10 MG/ML
INJECTION INTRAMUSCULAR; INTRAVENOUS
Status: DISCONTINUED
Start: 2019-07-24 | End: 2019-07-24 | Stop reason: HOSPADM

## 2019-07-24 RX ORDER — LIDOCAINE HYDROCHLORIDE 10 MG/ML
INJECTION, SOLUTION EPIDURAL; INFILTRATION; INTRACAUDAL; PERINEURAL
Status: DISCONTINUED
Start: 2019-07-24 | End: 2019-07-24 | Stop reason: HOSPADM

## 2019-07-24 RX ORDER — ALPRAZOLAM 0.5 MG/1
1 TABLET, ORALLY DISINTEGRATING ORAL ONCE AS NEEDED
Status: COMPLETED | OUTPATIENT
Start: 2019-07-24 | End: 2019-07-24

## 2019-07-24 RX ORDER — DEXAMETHASONE SODIUM PHOSPHATE 10 MG/ML
10 INJECTION INTRAMUSCULAR; INTRAVENOUS ONCE
Status: COMPLETED | OUTPATIENT
Start: 2019-07-24 | End: 2019-07-24

## 2019-07-24 RX ORDER — ALPRAZOLAM 0.5 MG/1
TABLET, ORALLY DISINTEGRATING ORAL
Status: DISCONTINUED
Start: 2019-07-24 | End: 2019-07-24 | Stop reason: HOSPADM

## 2019-07-24 RX ORDER — LIDOCAINE HYDROCHLORIDE 10 MG/ML
1 INJECTION, SOLUTION EPIDURAL; INFILTRATION; INTRACAUDAL; PERINEURAL ONCE
Status: COMPLETED | OUTPATIENT
Start: 2019-07-24 | End: 2019-07-24

## 2019-07-24 RX ORDER — LIDOCAINE HYDROCHLORIDE 20 MG/ML
INJECTION, SOLUTION INFILTRATION; PERINEURAL
Status: DISCONTINUED
Start: 2019-07-24 | End: 2019-07-24 | Stop reason: HOSPADM

## 2019-07-24 RX ADMIN — ALPRAZOLAM 0.5 MG: 0.5 TABLET, ORALLY DISINTEGRATING ORAL at 01:07

## 2019-07-24 NOTE — OP NOTE
Lumbar Interlaminar Epidural Steroid Injection under Fluoroscopic Guidance.  Time-out taken to identify patient and procedure side prior to starting the procedure.   I attest that I have reviewed the patient's home medications prior to the procedure and no contraindication have been identified. I  re-evaluated the patient after the patient was positioned for the procedure in the procedure room immediately before the procedural time-out. The vital signs are current and represent the current state of the patient which has not significantly changed since the preprocedure assessment.                                                               Date of Service: 07/24/2019    PCP: Cindi Hendricks MD    Referring Physician:    Procedure:  L4-5 Interlaminar epidural steroid injection under fluoroscopic guidance.    Reasons for procedure: DDD (degenerative disc disease), lumbar [M51.36]  Lumbar radiculopathy [M54.16]     Physician: Anders Henry MD  ASSISTANTS: None    Medications injected: Preservative-free dexamethasone 10mg with 2mL of sterile Xylocaine-MPF 1% and 2ml of sterile preservative-free normal saline.    Local anesthetic injected:    Xylocaine 2% 9ml with Sodium Bicarbonate 3ml.   Sedation Medications: None    Estimated blood loss:  none.    Complications:  none.    Technique:  With the patient laying in a prone position, the area was prepped and draped in the usual sterile fashion using ChloraPrep and a fenestrated drape.  Local anesthetic was given using a 27-gauge needle by raising a wheal and going down to the hub of the needle.  A 3.5 inch 20-gauge Touhy needle was introduced under fluoroscopic guidance.  It met the lamina of the posterior element. The needle was then hinged above the lamina.  Loss of resistance technique was employed while advancing the needle.  Once in the desired position, contrast dye Omnipaque was injected to confirm placement and there was no vascular runoff.  Digital  subtraction was employed to confirm that there was no vascular runoff.  The medication was then injected slowly.  The patient tolerated the procedure well.      Pain before the procedure: 0/10    Pain after the procedure: 0/10    The patient was monitored after the procedure.   They were given post-procedure and discharge instructions to follow at home.  The patient was discharged in a stable condition.

## 2019-07-24 NOTE — H&P
Subjective:     Patient ID: Elda Cui is a 84 y.o. female    Chief Complaint: Low back pain      Referred by: Anders Henry Jr*      HPI:      Elda Cui is a 84 y.o. female who presents today for L4-5 ILESI. She denies any changes in the quality or location of her pain since last encounter.   This pain is described in detail below.        Review of Systems   Constitutional: Negative for activity change, appetite change, chills, fatigue, fever and unexpected weight change.   HENT: Negative for hearing loss.    Eyes: Negative for visual disturbance.   Respiratory: Negative for chest tightness and shortness of breath.    Cardiovascular: Negative for chest pain.   Gastrointestinal: Negative for abdominal pain, constipation, diarrhea, nausea and vomiting.   Genitourinary: Negative for difficulty urinating.   Musculoskeletal: Positive for back pain, gait problem and myalgias. Negative for neck pain.   Skin: Negative for rash.   Neurological: Negative for dizziness, weakness, light-headedness, numbness and headaches.   Psychiatric/Behavioral: Positive for sleep disturbance. Negative for hallucinations and suicidal ideas. The patient is not nervous/anxious.        Past Medical History:   Diagnosis Date    Allergy     Bilateral nonexudative age-related macular degeneration 5/2/2014    Blood transfusion     Glaucoma     Hyperlipidemia     Joint pain     Lumbar spondylosis 3/25/2019    Memory loss     Pseudophakia of both eyes 5/20/2014    Squamous Cell Carcinoma 6/13/13    in situ, excised from L cheek    Syncope 1/7/2014    Ulcerative colitis        Past Surgical History:   Procedure Laterality Date    AHMED GLAUCOMA IMPLANT Right 3/2/16        APPENDECTOMY      CATARACT EXTRACTION W/  INTRAOCULAR LENS IMPLANT Bilateral n/a    Done in [place]    CHOLECYSTECTOMY      COLON SURGERY      COLONOSCOPY  1990s  [facility] ([facility])    Benign polyps removed.     COSMETIC SURGERY      [facility]    Sigmoid diverticulosis.    EYE SURGERY      FLEXIBLE SIGMOIDOSCOPY  2007  [place] [facility]    Unable to complete, had CT colography.    GALLBLADDER SURGERY      Injection, Steroid, Epidural N/A 3/28/2019    Performed by Anders Henry Jr., [facility] at [facility] ENDO    INSERTION-IMPLANT-GLAUCOMA/AHMED Right 3/2/2016    Performed by Charlene Leger MD at [facility] OR [address] [place] [address]    NASAL SEPTUM SURGERY      TONSILLECTOMY      TOTAL ABDOMINAL HYSTERECTOMY W/ BILATERAL SALPINGOOPHORECTOMY         Social History     Socioeconomic History    Marital status:      Spouse name: Not on file    Number of children: Not on file    Years of education: Not on file    Highest education level: Not on file   Occupational History    Not on file   Social Needs    Financial resource strain: Not on file    Food insecurity:     Worry: Not on file     Inability: Not on file    Transportation needs:     Medical: Not on file     Non-medical: Not on file   Tobacco Use    Smoking status: Former Smoker     Last attempt to quit: 10/2/1974     Years since quittin.8    Smokeless tobacco: Never Used   Substance and Sexual Activity    Alcohol use: Yes     Alcohol/week: 0.0 oz     Comment: Social    Drug use: No    Sexual activity: Yes     Partners: Male     Birth control/protection: Surgical   Lifestyle    Physical activity:     Days per week: Not on file     Minutes per session: Not on file    Stress: Not on file   Relationships    Social connections:     Talks on phone: Not on file     Gets together: Not on file     Attends Congregation service: Not on file     Active member of club or organization: Not on file     Attends meetings of clubs or organizations: Not on file     Relationship status: Not on file   Other Topics Concern    Are you pregnant or think you may be? No    Breast-feeding No   Social History Narrative    Not on file  "      Review of patient's allergies indicates:   Allergen Reactions    Doxycycline Nausea And Vomiting       No current facility-administered medications on file prior to encounter.      Current Outpatient Medications on File Prior to Encounter   Medication Sig Dispense Refill    donepezil (ARICEPT) 10 MG tablet Take 1 tablet (10 mg total) by mouth every evening. 30 tablet 11    gabapentin (NEURONTIN) 300 MG capsule Take 2 capsules (600 mg total) by mouth 3 (three) times daily. 180 capsule 11    memantine (NAMENDA XR) 28 mg CSpX TAKE 1 CAPSULE(28 MG) BY MOUTH EVERY DAY 30 capsule 0    midodrine (PROAMATINE) 10 MG tablet Take 1 tablet (10 mg total) by mouth 3 (three) times daily. 270 tablet 1    meloxicam (MOBIC) 7.5 MG tablet Take 1 tablet (7.5 mg total) by mouth daily as needed for Pain. 30 tablet 2       Objective:      /64 (BP Location: Left arm, Patient Position: Lying)   Pulse (!) 59   Temp 98.4 °F (36.9 °C) (Oral)   Resp 18   Ht 5' 3" (1.6 m)   Wt 69.9 kg (154 lb)   SpO2 98%   Breastfeeding? No   BMI 27.28 kg/m²     Exam:  AAOx3 NAD  Mood and affect normal  Memory and language intact  Breathing Non labored        Assessment:       Lumbar DDD      Plan:         Elda Cui is a 84 y.o. female with lumbar DDD.    Proceed with SHARDA as planned.    "

## 2019-07-25 RX ORDER — ROSUVASTATIN CALCIUM 10 MG/1
10 TABLET, COATED ORAL DAILY
Qty: 90 TABLET | Refills: 1 | Status: SHIPPED | OUTPATIENT
Start: 2019-07-25 | End: 2020-01-13

## 2019-08-06 ENCOUNTER — OFFICE VISIT (OUTPATIENT)
Dept: PAIN MEDICINE | Facility: CLINIC | Age: 84
End: 2019-08-06
Payer: MEDICARE

## 2019-08-06 VITALS
DIASTOLIC BLOOD PRESSURE: 56 MMHG | SYSTOLIC BLOOD PRESSURE: 122 MMHG | HEART RATE: 77 BPM | HEIGHT: 63 IN | BODY MASS INDEX: 26.81 KG/M2 | OXYGEN SATURATION: 95 % | WEIGHT: 151.31 LBS

## 2019-08-06 DIAGNOSIS — M48.062 SPINAL STENOSIS OF LUMBAR REGION WITH NEUROGENIC CLAUDICATION: ICD-10-CM

## 2019-08-06 DIAGNOSIS — M51.36 DDD (DEGENERATIVE DISC DISEASE), LUMBAR: Primary | ICD-10-CM

## 2019-08-06 DIAGNOSIS — M47.816 LUMBAR SPONDYLOSIS: ICD-10-CM

## 2019-08-06 PROCEDURE — 99213 OFFICE O/P EST LOW 20 MIN: CPT | Mod: PBBFAC,PN | Performed by: PAIN MEDICINE

## 2019-08-06 PROCEDURE — 99213 OFFICE O/P EST LOW 20 MIN: CPT | Mod: S$PBB,,, | Performed by: PAIN MEDICINE

## 2019-08-06 PROCEDURE — 99999 PR PBB SHADOW E&M-EST. PATIENT-LVL III: ICD-10-PCS | Mod: PBBFAC,,, | Performed by: PAIN MEDICINE

## 2019-08-06 PROCEDURE — 99999 PR PBB SHADOW E&M-EST. PATIENT-LVL III: CPT | Mod: PBBFAC,,, | Performed by: PAIN MEDICINE

## 2019-08-06 PROCEDURE — 99213 PR OFFICE/OUTPT VISIT, EST, LEVL III, 20-29 MIN: ICD-10-PCS | Mod: S$PBB,,, | Performed by: PAIN MEDICINE

## 2019-08-06 NOTE — PROGRESS NOTES
Subjective:     Patient ID: Elda Cui is a 84 y.o. female    Chief Complaint: Follow-up      Referred by: No ref. provider found      HPI:    Interval History (8/6/19):  She returns today for follow up.  She reports that L4-5 interlaminar epidural steroid injection has been helpful for the low back and lower extremity pain. She reports about 50% relief of her pain following this procedure. She states that this is an adequate amount of relief and she does not feel that further interventions are needed at this time.      Interval History (7/1/19):  She returns today for follow up.  She reports that back pain has returned.  She denies any changes in the quality or location of this pain. She feels that his pain may be worsened intensity than previously.  She is interested in a repeat epidural steroid injection if appropriate.      Interval History (4/11/19):  She returns today for follow up.  She reports that L4-5 interlaminar epidural steroid injection has been helpful for the low back and lower extremity pain.  She reports 100% relief immediately following the procedure for a few days.  The degree of relief has since slightly decreased and she is currently reporting 80% relief.  She denies any new or worsening symptoms.      Interval History (3/25/19):  She returns today for follow up and MRI review.  She reports that her pain is unchanged in quality and location since last encounter.  Outside records show that patient underwent multiple right sacroiliac joint steroid injections.  Patient states that these did provide mild to moderately foot for a very short period of time. She also underwent a right lumbar RFA in December of 2018.  This provided greater than 80% relief but also for a very short time.      Initial Encounter (3/11/19):  Elda Cui is a 84 y.o. female who presents today with chronic right-sided low back pain. This pain has been present for about 3 years.  No specific  inciting event or injury noted.  Pain is located in the right lumbosacral region and radiates to right buttock and posterior thigh.  It does not cross the knee.  She denies any associated numbness, tingling, weakness or bowel or bladder dysfunction associated with the pain. The pain is not present while at rest.  She can only stand or walk for short periods of time before the pain becomes quite bothersome.  The pain resolves within a few minutes after sitting.  She has been previously treated by Dr. Petersen and has undergone multiple interventional procedures.  These include but may not be limited to right L4-5 and L5-S1 facet injections, right sacroiliac joint injection, as well as right lumbar are phase.  Patient states that these procedures did provide some relief initially but not for very long time.  We do not have complete records regarding her previous treatments at this time.   This pain is described in detail below.    Physical Therapy:  Yes.  Not effective    Non-pharmacologic Treatment:  Rest helps         · TENS?  No    Pain Medications:         · Currently taking:  Nothing    · Has tried in the past:  Tylenol, NSAIDs    · Has not tried:  Opioids, Muscle relaxants, TCAs, SNRIs, anticonvulsants, topical creams    Blood thinners:  None    Interventional Therapies:    Previous right sacroiliac joint steroid injections and right lumbar RFA performed by Dr. Petersen.   3/28/19 - L4-5 interlaminar epidural steroid injection - 80% relief  7/24/19 - L4-5 interlaminar epidural steroid injection - 50% relief    Relevant Surgeries:  None    Affecting sleep?  Yes    Affecting daily activities? yes    Depressive symptoms? no          · SI/HI? No    Work status: Retired    Pain Scores:    Best:       2/10  Worst:     8/10  Usually:   7/10  Today:    4/10    Review of Systems   Constitutional: Negative for activity change, appetite change, chills, fatigue, fever and unexpected weight change.   HENT: Negative for hearing loss.     Eyes: Negative for visual disturbance.   Respiratory: Negative for chest tightness and shortness of breath.    Cardiovascular: Negative for chest pain.   Gastrointestinal: Negative for abdominal pain, constipation, diarrhea, nausea and vomiting.   Genitourinary: Negative for difficulty urinating.   Musculoskeletal: Positive for back pain, gait problem and myalgias. Negative for neck pain.   Skin: Negative for rash.   Neurological: Negative for dizziness, weakness, light-headedness, numbness and headaches.   Psychiatric/Behavioral: Positive for sleep disturbance. Negative for hallucinations and suicidal ideas. The patient is not nervous/anxious.        Past Medical History:   Diagnosis Date    Allergy     Bilateral nonexudative age-related macular degeneration 5/2/2014    Blood transfusion     Glaucoma     Hyperlipidemia     Joint pain     Lumbar spondylosis 3/25/2019    Memory loss     Pseudophakia of both eyes 5/20/2014    Squamous Cell Carcinoma 6/13/13    in situ, excised from L cheek    Syncope 1/7/2014    Ulcerative colitis        Past Surgical History:   Procedure Laterality Date    AHMED GLAUCOMA IMPLANT Right 3/2/16        APPENDECTOMY      CATARACT EXTRACTION W/  INTRAOCULAR LENS IMPLANT Bilateral n/a    Done in [place]    CHOLECYSTECTOMY      COLON SURGERY      [facility]OS[facility]  [place] [facility]    Benign polyps removed.    COSMETIC SURGERY      CT COLOGRAPHY  8/31/2007   ([facility])    Sigmoid diverticulosis.    EYE SURGERY  2011    FLEXIBLE SIGMOIDOSCOPY  8/31/2007  [facility]    Unable to complete, had CT colography.    GALLBLADDER SURGERY      Injection, Steroid, Epidural N/A 7/24/2019    Performed by Anders Henry Jr[facility] at [facility] ENDO    Injection, Steroid, Epidural N/A 3/28/2019    Performed by Anders [facility] at [facility] ENDO    INSERTION-IMPLANT-GLAUCOMA/AHMED Right 3/2/2016    [facility]formed by [facility] at [facility] OR  [facility]    NASAL SEPTUM SURGERY      TONSILLECTOMY      TOTAL ABDOMINAL HYSTERECTOMY W/ BILATERAL SALPINGOOPHORECTOMY         Social History     Socioeconomic History    Marital status:      Spouse name: Not on file    Number of children: Not on file    Years of education: Not on file    Highest education level: Not on file   Occupational History    Not on file   Social Needs    Financial resource strain: Not on file    Food insecurity:     Worry: Not on file     Inability: Not on file    Transportation needs:     Medical: Not on file     Non-medical: Not on file   Tobacco Use    Smoking status: Former Smoker     Last attempt to quit: 10/2/1974     Years since quittin.8    Smokeless tobacco: Never Used   Substance and Sexual Activity    Alcohol use: Yes     Alcohol/week: 0.0 oz     Comment: Social    Drug use: No    Sexual activity: Yes     Partners: Male     Birth control/protection: Surgical   Lifestyle    Physical activity:     Days per week: Not on file     Minutes per session: Not on file    Stress: Not on file   Relationships    Social connections:     Talks on phone: Not on file     Gets together: Not on file     Attends Yarsanism service: Not on file     Active member of club or organization: Not on file     Attends meetings of clubs or organizations: Not on file     Relationship status: Not on file   Other Topics Concern    Are you pregnant or think you may be? No    Breast-feeding No   Social History Narrative    Not on file       Review of patient's allergies indicates:   Allergen Reactions    Doxycycline Nausea And Vomiting       Current Outpatient Medications on File Prior to Visit   Medication Sig Dispense Refill    donepezil (ARICEPT) 10 MG tablet Take 1 tablet (10 mg total) by mouth every evening. 30 tablet 11    gabapentin (NEURONTIN) 300 MG capsule Take 2 capsules (600 mg total) by mouth 3 (three) times daily. 180 capsule 11    memantine (NAMENDA XR) 28 mg CSpX  "TAKE 1 CAPSULE(28 MG) BY MOUTH EVERY DAY 30 capsule 0    midodrine (PROAMATINE) 10 MG tablet Take 1 tablet (10 mg total) by mouth 3 (three) times daily. 270 tablet 1    rosuvastatin (CRESTOR) 10 MG tablet Take 1 tablet (10 mg total) by mouth once daily. 90 tablet 1    meloxicam (MOBIC) 7.5 MG tablet Take 1 tablet (7.5 mg total) by mouth daily as needed for Pain. 30 tablet 2     No current facility-administered medications on file prior to visit.        Objective:      BP (!) 122/56   Pulse 77   Ht 5' 3" (1.6 m)   Wt 68.6 kg (151 lb 4.8 oz)   SpO2 95%   BMI 26.80 kg/m²     Exam:  GEN:  Well developed, well nourished.  No acute distress.   HEENT:  No trauma.  Mucous membranes moist.  Nares patent bilaterally.  PSYCH: Normal affect. Thought content appropriate.  CHEST:  Breathing symmetric.  No audible wheezing.  ABD: Soft, non-distended.  SKIN:  Warm, pink, dry.  No rash on exposed areas.    EXT:  No cyanosis, clubbing, or edema.  No color change or changes in nail or hair growth.  NEURO/MUSCULOSKELETAL:  Fully alert, oriented, and appropriate. Speech normal mahnaz. No cranial nerve deficits.   Gait:  Normal.  No focal motor deficits.       Imaging:  Narrative     EXAMINATION:  MRI LUMBAR SPINE WITHOUT CONTRAST    CLINICAL HISTORY:  lumbar spinal stenosis; Other intervertebral disc degeneration, lumbar region    TECHNIQUE:  Multiplanar, multisequence MR images were acquired from the thoracolumbar junction to the sacrum without the administration of contrast.    COMPARISON:  None.    FINDINGS:  The distal cord/conus demonstrates normal size and signal.  No evidence of osteomyelitis, marrow replacement process, or acute fracture.  Probable small left renal cyst.  No paraspinal masses.    At T12-L1, there is a small right paracentral disc protrusion.  No spinal canal stenosis.    At L1-2, there is mild disc bulging and facet hypertrophy, resulting in mild spinal canal stenosis.  No significant neural foraminal " narrowing.    At L2-3, there is asymmetric disc bulging to the left with facet hypertrophy, resulting in mild spinal canal stenosis, moderate left and mild right-sided neural foraminal narrowing.    At L3-4, there is moderate disc bulging and facet hypertrophy, resulting in mild spinal canal stenosis, and moderate left and mild right-sided neural foraminal narrowing.    At L4-5, there is prominent facet arthropathy.  There is mild disc bulging.  This results in moderate left and mild right-sided neural foraminal narrowing.  No spinal canal stenosis.    At L5-S1, there is there is prominent degenerative disc disease, noting disc height loss and sub endplate marrow edema.  There is asymmetric disc bulging to the right.  This results in severe right and moderate left-sided neural foraminal narrowing.  No spinal canal stenosis.   Impression       Lumbar spondylosis, resulting in mild spinal canal stenosis from L1-2 thru L3-4, and moderate/ severe neural foraminal narrowing from L2-3 through L5-S1, as above.      Electronically signed by: Chris Farias MD  Date: 03/15/2019  Time: 10:29    Encounter     View Encounter                   Assessment:       Encounter Diagnoses   Name Primary?    DDD (degenerative disc disease), lumbar Yes    Lumbar spondylosis     Spinal stenosis of lumbar region with neurogenic claudication          Plan:       Elda was seen today for follow-up.    Diagnoses and all orders for this visit:    DDD (degenerative disc disease), lumbar    Lumbar spondylosis    Spinal stenosis of lumbar region with neurogenic claudication        Elda Cui is a 84 y.o. female with chronic right-sided low back pain.  Possible etiologies include spinal stenosis and facet/sacroiliac joint pathology.  Getting good relief with interlaminar epidural steroid injections.    1.  No further interventions at this time. Can repeat epidural steroid injections in the future if needed.  2.  Return to clinic  as needed.  If epidural steroid injection are not providing adequate relief in the future we may consider spinal cord stimulator trial.

## 2019-08-15 ENCOUNTER — OFFICE VISIT (OUTPATIENT)
Dept: FAMILY MEDICINE | Facility: CLINIC | Age: 84
End: 2019-08-15
Payer: MEDICARE

## 2019-08-15 VITALS
HEIGHT: 63 IN | TEMPERATURE: 98 F | BODY MASS INDEX: 26.8 KG/M2 | HEART RATE: 68 BPM | WEIGHT: 151.25 LBS | SYSTOLIC BLOOD PRESSURE: 112 MMHG | DIASTOLIC BLOOD PRESSURE: 67 MMHG | OXYGEN SATURATION: 95 % | RESPIRATION RATE: 17 BRPM

## 2019-08-15 DIAGNOSIS — N30.01 ACUTE CYSTITIS WITH HEMATURIA: Primary | ICD-10-CM

## 2019-08-15 LAB
BILIRUB SERPL-MCNC: ABNORMAL MG/DL
BILIRUB UR QL STRIP: NEGATIVE
BLOOD URINE, POC: ABNORMAL
CLARITY UR REFRACT.AUTO: CLEAR
COLOR UR AUTO: YELLOW
COLOR, POC UA: YELLOW
GLUCOSE UR QL STRIP: NEGATIVE
GLUCOSE UR QL STRIP: NORMAL
HGB UR QL STRIP: NEGATIVE
KETONES UR QL STRIP: ABNORMAL
KETONES UR QL STRIP: NEGATIVE
LEUKOCYTE ESTERASE UR QL STRIP: ABNORMAL
LEUKOCYTE ESTERASE URINE, POC: ABNORMAL
MICROSCOPIC COMMENT: NORMAL
NITRITE UR QL STRIP: NEGATIVE
NITRITE, POC UA: ABNORMAL
PH UR STRIP: 6 [PH] (ref 5–8)
PH, POC UA: 6
PROT UR QL STRIP: NEGATIVE
PROTEIN, POC: ABNORMAL
RBC #/AREA URNS AUTO: 1 /HPF (ref 0–4)
SP GR UR STRIP: 1.01 (ref 1–1.03)
SPECIFIC GRAVITY, POC UA: 1.01
SQUAMOUS #/AREA URNS AUTO: 0 /HPF
URN SPEC COLLECT METH UR: ABNORMAL
UROBILINOGEN, POC UA: NORMAL
WBC #/AREA URNS AUTO: 5 /HPF (ref 0–5)

## 2019-08-15 PROCEDURE — 99999 PR PBB SHADOW E&M-EST. PATIENT-LVL III: ICD-10-PCS | Mod: PBBFAC,,, | Performed by: FAMILY MEDICINE

## 2019-08-15 PROCEDURE — 99214 OFFICE O/P EST MOD 30 MIN: CPT | Mod: S$PBB,,, | Performed by: FAMILY MEDICINE

## 2019-08-15 PROCEDURE — 99213 OFFICE O/P EST LOW 20 MIN: CPT | Mod: PBBFAC,PO | Performed by: FAMILY MEDICINE

## 2019-08-15 PROCEDURE — 99999 PR PBB SHADOW E&M-EST. PATIENT-LVL III: CPT | Mod: PBBFAC,,, | Performed by: FAMILY MEDICINE

## 2019-08-15 PROCEDURE — 81001 URINALYSIS AUTO W/SCOPE: CPT | Mod: PBBFAC,PO | Performed by: FAMILY MEDICINE

## 2019-08-15 PROCEDURE — 81001 URINALYSIS AUTO W/SCOPE: CPT

## 2019-08-15 PROCEDURE — 87086 URINE CULTURE/COLONY COUNT: CPT

## 2019-08-15 PROCEDURE — 99214 PR OFFICE/OUTPT VISIT, EST, LEVL IV, 30-39 MIN: ICD-10-PCS | Mod: S$PBB,,, | Performed by: FAMILY MEDICINE

## 2019-08-15 RX ORDER — NITROFURANTOIN (MACROCRYSTALS) 100 MG/1
100 CAPSULE ORAL EVERY 12 HOURS
Qty: 14 CAPSULE | Refills: 0 | Status: SHIPPED | OUTPATIENT
Start: 2019-08-15 | End: 2019-08-22

## 2019-08-15 NOTE — PROGRESS NOTES
Subjective:       Patient ID: Elda Cui     Chief Complaint: Urinary Tract Infection (one week /ongoing); Establish Care; and Urinary Retention (one week)      Bety Cui is a 84 y.o. female. Presents with urinary frequency and incontinence x 1 week.  Nocturia x 2.    Review of patient's allergies indicates:   Allergen Reactions    Doxycycline Nausea And Vomiting       Current Outpatient Medications:     donepezil (ARICEPT) 10 MG tablet, Take 1 tablet (10 mg total) by mouth every evening., Disp: 30 tablet, Rfl: 11    gabapentin (NEURONTIN) 300 MG capsule, Take 2 capsules (600 mg total) by mouth 3 (three) times daily., Disp: 180 capsule, Rfl: 11    meloxicam (MOBIC) 7.5 MG tablet, Take 1 tablet (7.5 mg total) by mouth daily as needed for Pain., Disp: 30 tablet, Rfl: 2    memantine (NAMENDA XR) 28 mg CSpX, TAKE 1 CAPSULE(28 MG) BY MOUTH EVERY DAY, Disp: 30 capsule, Rfl: 0    midodrine (PROAMATINE) 10 MG tablet, Take 1 tablet (10 mg total) by mouth 3 (three) times daily., Disp: 270 tablet, Rfl: 1    rosuvastatin (CRESTOR) 10 MG tablet, Take 1 tablet (10 mg total) by mouth once daily., Disp: 90 tablet, Rfl: 1    nitrofurantoin (MACRODANTIN) 100 MG capsule, Take 1 capsule (100 mg total) by mouth every 12 (twelve) hours. for 7 days, Disp: 14 capsule, Rfl: 0    Past Medical History:   Diagnosis Date    Allergy     Bilateral nonexudative age-related macular degeneration 5/2/2014    Blood transfusion     Glaucoma     Hyperlipidemia     Joint pain     Lumbar spondylosis 3/25/2019    Memory loss     Pseudophakia of both eyes 5/20/2014    Squamous Cell Carcinoma 6/13/13    in situ, excised from L cheek    Syncope 1/7/2014    Ulcerative colitis      Review of Systems   Constitutional: Positive for fever.   Genitourinary: Negative for dysuria and hematuria.   Psychiatric/Behavioral: Positive for behavioral problems.       Objective:      Physical Exam   Constitutional: She  appears well-developed and well-nourished.   HENT:   Head: Normocephalic and atraumatic.   Neck: Normal range of motion. Neck supple.   Cardiovascular: Normal rate and regular rhythm.   Pulmonary/Chest: Effort normal and breath sounds normal.   Abdominal: Soft. Bowel sounds are normal. There is no tenderness.   Musculoskeletal: She exhibits no edema or deformity.   Neurological: She is alert.   Skin: Skin is warm and dry.   Psychiatric: She has a normal mood and affect.       Results for orders placed or performed in visit on 08/15/19   POCT urinalysis, dipstick or tablet reag   Result Value Ref Range    Color, UA yellow     Spec Grav UA 1.010     pH, UA 6     WBC, UA pos++     Nitrite, UA neg     Protein trace     Glucose, UA normal     Ketones, UA neg     Urobilinogen, UA normal     Bilirubin neg     Blood, UA trace      Assessment:       1. Acute cystitis with hematuria        Plan:       Elda was seen today for urinary tract infection, establish care and urinary retention.    Diagnoses and all orders for this visit:    Acute cystitis with hematuria  -     Urine culture  -     Urinalysis  -     nitrofurantoin (MACRODANTIN) 100 MG capsule; Take 1 capsule (100 mg total) by mouth every 12 (twelve) hours. for 7 days

## 2019-08-16 LAB
BACTERIA UR CULT: NORMAL
BACTERIA UR CULT: NORMAL

## 2019-08-17 ENCOUNTER — NURSE TRIAGE (OUTPATIENT)
Dept: ADMINISTRATIVE | Facility: CLINIC | Age: 84
End: 2019-08-17

## 2019-08-17 NOTE — TELEPHONE ENCOUNTER
Reason for Disposition   Message left on identified voice mail    Additional Information   Negative: Caller is angry or rude (e.g., hangs up, verbally abusive, yelling)   Negative: Caller hangs up   Negative: Caller has already spoken with the PCP and has no further questions.   Negative: Caller has already spoken with another triager and has no further questions.   Negative: Caller has already spoken with another triager or PCP AND has further questions AND triager able to answer questions.   Negative: No answer.  First attempt to contact caller.  Follow-up call scheduled within 15 minutes.   Negative: Busy signal.  First attempt to contact caller.  Follow-up call scheduled within 15 minutes.    Protocols used: NO CONTACT OR DUPLICATE CONTACT CALL-LICHA-  LM x2 at 256pm at [phone]

## 2019-08-22 ENCOUNTER — TELEPHONE (OUTPATIENT)
Dept: FAMILY MEDICINE | Facility: CLINIC | Age: 84
End: 2019-08-22

## 2019-08-22 DIAGNOSIS — N30.01 ACUTE CYSTITIS WITH HEMATURIA: Primary | ICD-10-CM

## 2019-08-22 NOTE — TELEPHONE ENCOUNTER
----- Message from Cindi Hendricks MD sent at 8/21/2019  6:22 PM CDT -----  Inform patient urine culture negative for UTI.  Please have her to follow up if urinary symptoms persist.

## 2019-08-27 ENCOUNTER — PES CALL (OUTPATIENT)
Dept: ADMINISTRATIVE | Facility: CLINIC | Age: 84
End: 2019-08-27

## 2019-08-28 NOTE — TELEPHONE ENCOUNTER
Pt returned call; informed of results; states she is still having same urinary symptoms as discussed at [facility] on 8/15/19; please advise

## 2019-09-06 DIAGNOSIS — F03.90 DEMENTIA WITHOUT BEHAVIORAL DISTURBANCE, UNSPECIFIED DEMENTIA TYPE: ICD-10-CM

## 2019-09-06 RX ORDER — DONEPEZIL HYDROCHLORIDE 10 MG/1
TABLET, FILM COATED ORAL
Qty: 30 TABLET | Refills: 0 | Status: SHIPPED | OUTPATIENT
Start: 2019-09-06 | End: 2019-10-07 | Stop reason: SDUPTHER

## 2019-09-16 ENCOUNTER — OFFICE VISIT (OUTPATIENT)
Dept: UROLOGY | Facility: CLINIC | Age: 84
End: 2019-09-16
Payer: MEDICARE

## 2019-09-16 VITALS
BODY MASS INDEX: 26.75 KG/M2 | SYSTOLIC BLOOD PRESSURE: 110 MMHG | DIASTOLIC BLOOD PRESSURE: 70 MMHG | HEIGHT: 63 IN | WEIGHT: 151 LBS

## 2019-09-16 DIAGNOSIS — R35.1 NOCTURIA: Primary | ICD-10-CM

## 2019-09-16 DIAGNOSIS — R39.15 URINARY URGENCY: ICD-10-CM

## 2019-09-16 DIAGNOSIS — R35.0 URINARY FREQUENCY: ICD-10-CM

## 2019-09-16 LAB
BILIRUB SERPL-MCNC: NORMAL MG/DL
BLOOD URINE, POC: NORMAL
COLOR, POC UA: NORMAL
GLUCOSE UR QL STRIP: NORMAL
KETONES UR QL STRIP: NORMAL
LEUKOCYTE ESTERASE URINE, POC: NORMAL
NITRITE, POC UA: NORMAL
PH, POC UA: 6
PROTEIN, POC: NORMAL
SPECIFIC GRAVITY, POC UA: 1015
UROBILINOGEN, POC UA: NORMAL

## 2019-09-16 PROCEDURE — 99214 OFFICE O/P EST MOD 30 MIN: CPT | Mod: S$PBB,,, | Performed by: NURSE PRACTITIONER

## 2019-09-16 PROCEDURE — 51798 US URINE CAPACITY MEASURE: CPT | Mod: PBBFAC | Performed by: NURSE PRACTITIONER

## 2019-09-16 PROCEDURE — 81001 URINALYSIS AUTO W/SCOPE: CPT | Mod: PBBFAC | Performed by: NURSE PRACTITIONER

## 2019-09-16 PROCEDURE — 99999 PR PBB SHADOW E&M-EST. PATIENT-LVL III: CPT | Mod: PBBFAC,,, | Performed by: NURSE PRACTITIONER

## 2019-09-16 PROCEDURE — 99999 PR PBB SHADOW E&M-EST. PATIENT-LVL III: ICD-10-PCS | Mod: PBBFAC,,, | Performed by: NURSE PRACTITIONER

## 2019-09-16 PROCEDURE — 99214 PR OFFICE/OUTPT VISIT, EST, LEVL IV, 30-39 MIN: ICD-10-PCS | Mod: S$PBB,,, | Performed by: NURSE PRACTITIONER

## 2019-09-16 PROCEDURE — 87086 URINE CULTURE/COLONY COUNT: CPT

## 2019-09-16 PROCEDURE — 99213 OFFICE O/P EST LOW 20 MIN: CPT | Mod: PBBFAC | Performed by: NURSE PRACTITIONER

## 2019-09-16 NOTE — LETTER
September 16, 2019      Adele Rivera PA-C  [address] [facility]  [place] [address]           [facility]  [address] [facility] [address]  [place] [address]  Phone: [phone]  Fax: [phone]          Patient: Elda Cui   MR Number: [id number]   YOB: 1935   Date of Visit: 9/16/2019       Dear Adele Rivera:    Thank you for referring Elda Cui to me for evaluation. Attached you will find relevant portions of my assessment and plan of care.    If you have questions, please do not hesitate to call me. I look forward to following Elda Cui along with you.    Sincerely,    Doris Loya, THO    Enclosure  CC:  No Recipients    If you would like to receive this communication electronically, please contact [email or web address] or [phone] to request more information on [facility] access.    For providers and/or their staff who would like to refer a patient to [facility], please contact us through our one-stop-shop provider referral line, [facility], at [phone].    If you feel you have received this communication in error or would no longer like to receive these types of communications, please e-mail [email or web address]

## 2019-09-16 NOTE — PROGRESS NOTES
Subjective:       Patient ID: Elda Cui is a 84 y.o. female who is a new patient was referred by Adele Rivera PA-C for evaluation of acute cystitis    Chief Complaint:   Chief Complaint   Patient presents with    Other     Pt. states she is getting up several times during the night to urinate.. states no pain or burning or pressure .. she said just alot of frequency at night      Urinary Frequency/Nocturia  Patient reports issues with urinary frequency/nocturia for several months. She voids q 3-4 hours during the daytime. Nocturia (x 3-4) most bothersome. She also reports occasional UI but does not wear pads. Denies issues with constipation. No previous history of bladder surgeries. Denies vaginal bulge. Denies LE edema or snoring    She reports daily intake of coffee and alcohol with dinner. She was treated with Macrobid for presumed UTI by PCP about 1 month ago. UCx at that time showed contamination    PVR (bladder scan) today - 18 ml    ACTIVE MEDICAL ISSUES:  Patient Active Problem List   Diagnosis    Hyperlipidemia    Memory loss    Posterior vitreous detachment, both eyes    Primary open angle glaucoma of left eye    Exudative age-related macular degeneration of right eye with active choroidal neovascularization    Nonexudative age-related macular degeneration, bilateral, intermediate dry stage    Spinal stenosis of lumbar region with neurogenic claudication    Lumbar spondylosis    DDD (degenerative disc disease), lumbar    Lumbar degenerative disc disease       PAST MEDICAL HISTORY  Past Medical History:   Diagnosis Date    Allergy     Bilateral nonexudative age-related macular degeneration 5/2/2014    Blood transfusion     Glaucoma     Hyperlipidemia     Joint pain     Lumbar spondylosis 3/25/2019    Memory loss     Pseudophakia of both eyes 5/20/2014    Squamous Cell Carcinoma 6/13/13    in situ, excised from L cheek    Syncope 1/7/2014    Ulcerative colitis         PAST SURGICAL HISTORY:  Past Surgical History:   Procedure Laterality Date    AHMED GLAUCOMA IMPLANT Right 3/2/16        APPENDECTOMY      CATARACT EXTRACTION W/  INTRAOCULAR LENS IMPLANT Bilateral n/a    Done in [place]    CHOLECYSTECTOMY      COLON SURGERY      COLONOSCOPY    [place] (Doc)    Benign polyps removed.    COSMETIC SURGERY      CT COL[facility]GRAPHY  2007   ([facility])    Sigmoid diverticulosis.    EYE SURGERY      FLEXIBLE [facility]MOIDOSCOPY  [facility] ([facility]    Unable to complete, had CT colography.    GALLBLADDER SURGERY      Injection, Steroid, Epidural N/A 2019    Performed [facility] Jr[facility]    Injection, Steroid, Epidural N/A 3/28/2019    [facility] at [facility]    INSERTION-IMPLANT-GLAUCOMA/AHMED Right 3/2/2016    [address]    NASAL SEPTUM SURGERY      TONSILLECTOMY      TOTAL ABDOMINAL HYSTERECTOMY W/ BILATERAL SALPINGOOPHORECTOMY         SOCIAL HISTORY:  Social History     Tobacco Use    Smoking status: Former Smoker     Last attempt to quit: 10/2/1974     Years since quittin.9    Smokeless tobacco: Never Used   Substance Use Topics    Alcohol use: Yes     Alcohol/week: 0.0 oz     Comment: Social    Drug use: No       FAMILY HISTORY:  Family History   Problem Relation Age of Onset    Arthritis Mother     Macular degeneration Mother     Glaucoma Mother     Cataracts Mother     Cancer Mother         Colorectal    Cataracts Father     COPD Father     Cancer Sister         Breast    Arthritis Sister     No Known Problems Daughter     Stroke Sister     Cataracts Maternal Grandmother     Cataracts Maternal Grandfather     Cataracts Paternal Grandmother     Cataracts Paternal Grandfather     No Known Problems Brother     No Known Problems Maternal Aunt     No Known Problems Maternal Uncle     No Known  Problems Paternal Aunt     No Known Problems Paternal Uncle     Amblyopia Neg Hx     Blindness Neg Hx     Diabetes Neg Hx     Hypertension Neg Hx     Retinal detachment Neg Hx     Strabismus Neg Hx     Thyroid disease Neg Hx     Melanoma Neg Hx        ALLERGIES AND MEDICATIONS: updated and reviewed.  Review of patient's allergies indicates:   Allergen Reactions    Doxycycline Nausea And Vomiting     Current Outpatient Medications   Medication Sig    donepezil (ARICEPT) 10 MG tablet TAKE 1 TABLET(10 MG) BY MOUTH EVERY EVENING    gabapentin (NEURONTIN) 300 MG capsule Take 2 capsules (600 mg total) by mouth 3 (three) times daily.    memantine (NAMENDA XR) 28 mg CSpX TAKE 1 CAPSULE(28 MG) BY MOUTH EVERY DAY    midodrine (PROAMATINE) 10 MG tablet Take 1 tablet (10 mg total) by mouth 3 (three) times daily.    rosuvastatin (CRESTOR) 10 MG tablet Take 1 tablet (10 mg total) by mouth once daily.     No current facility-administered medications for this visit.        Review of Systems   Constitutional: Negative for activity change, chills, fatigue, fever and unexpected weight change.   Eyes: Negative for discharge, redness and visual disturbance.   Respiratory: Negative for cough, shortness of breath and wheezing.    Cardiovascular: Negative for chest pain and leg swelling.   Gastrointestinal: Negative for abdominal distention, abdominal pain, constipation, diarrhea, nausea and vomiting.   Genitourinary: Positive for frequency and urgency. Negative for decreased urine volume, difficulty urinating, dysuria, flank pain, hematuria, pelvic pain, vaginal bleeding, vaginal discharge and vaginal pain.   Musculoskeletal: Negative for arthralgias, joint swelling and myalgias.   Skin: Negative for color change and rash.   Neurological: Negative for dizziness and light-headedness.   Psychiatric/Behavioral: Negative for behavioral problems and confusion. The patient is not nervous/anxious.        Objective:      Vitals:     "09/16/19 1359   BP: 110/70   Weight: 68.5 kg (151 lb)   Height: 5' 3" (1.6 m)     Physical Exam   Constitutional: She is oriented to person, place, and time. She appears well-developed.   HENT:   Head: Normocephalic and atraumatic.   Nose: Nose normal.   Eyes: Conjunctivae are normal. Right eye exhibits no discharge. Left eye exhibits no discharge.   Neck: Normal range of motion. Neck supple. No tracheal deviation present. No thyromegaly present.   Cardiovascular: Normal rate and regular rhythm.    Pulmonary/Chest: Effort normal. No respiratory distress. She has no wheezes.   Abdominal: Soft. She exhibits no distension. There is no hepatosplenomegaly. There is no tenderness. There is no CVA tenderness. No hernia.   Genitourinary:   Genitourinary Comments: Patient declined exam   Musculoskeletal: Normal range of motion. She exhibits no edema.   Neurological: She is alert and oriented to person, place, and time.   Skin: Skin is warm and dry. No rash noted. No erythema.     Psychiatric: She has a normal mood and affect. Her behavior is normal. Judgment normal.       Urine dipstick shows ++LE.    Assessment:       1. Nocturia    2. Urinary frequency    3. Urinary urgency          Plan:       1. Nocturia  -Discussed avoiding/limiting bladder irritants. List provided  -Limit evening fluids  -Bladder diary  -She may need an anticholinergic vs Myrbetriq in the future--she declined medication for now  - POCT urinalysis, dipstick or tablet reag    2. Urinary frequency  -PVR acceptable today  - POCT Bladder Scan  - Urine culture    3. Urinary urgency  -Discussed avoiding/limiting bladder irritants. List provided  - Urine culture            Follow up in about 4 weeks (around 10/14/2019) for Follow up.  "

## 2019-09-18 LAB — BACTERIA UR CULT: NORMAL

## 2019-10-02 NOTE — PROGRESS NOTES
HPI     DLS: 6/10/19    Pt here for 4 month check;     Meds:   Ophthalmic lubricant  prn OU     1. PsEx OD   2. POAG OS   3. PVD OU   4. Exudative ARMD OU   5. APD OD   6. WILLIAM   7. PCIOL OU   8. Ptosis BRIAN   9. Yag Cap OD   10. S/P ahmed od - 3/2/2016     Last edited by Briseida Mendoza on 10/7/2019  9:53 AM. (History)              Assessment /Plan     For exam results, see Encounter Report.    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 Open-angle glaucoma, moderate stage   PsEx - moderate stage OD // POAG - mild stage os    First HVF   2013   First photos   5/2014   Treatment / Drops started   combigan, travatan (started in [place] after [place])            Family history    + both parents        Glaucoma meds    Off all gtts  (use to use Combigan od , travatan od )         H/O adverse rxn to glaucoma drops    Stopped combigan 2/2 low BP // stopped PG's 2/2 wet armd // stopped dorzolamide 2/2 dermatitis (( allergic conj od 2/2 alphagan P and/or willard)         LASERS    SLT od 10/15/2015 - no response (IOP went up)         GLAUCOMA SURGERIES    Ahmed OD 3/2/2016        OTHER EYE SURGERIES    CE/IOL OU        CDR    0.9/0.4        Tbase    10-19/10 - 16          Tmax    19/16 (on gtts)            Ttarget    17/17             HVF    4 test 2013 to  5/2017 - SAD od // gen dep - new  Os                    SWITCH TO 24-2 STIM V OD - 1 TEST  5/2017 to 5/2017 - SAD         Gonio    +3-4 OU        CCT    519/528        OCT    3 test 2014 to 2019 - RNFL - dec G/TI, bord TSod // wnl os        HRT    5 test 2014 to 2019 - MR -  Dec TS, NS bord G, T, TI od // bord TI/NI. I os /// CDR 0.745 od // 0.55 os         Disc photos    2014 - OIS    - Ttoday  15 /14   (( s/p ahmed os and no gtts od)   - Test today:- gonio    2. Posterior vitreous detachment, both eyes      3. Bilateral exudative age-related macular degeneration s/p avastin ou  Last avastin 11/24/2015 - ou - [place] //  -  saw cristala 12/7/2018      4 Ptosis OS  S/P lid lift ou   Still with mild ptosis OS     5. +APD OD - mild     6. WILLIAM  Uses systane      7.  Pseudophakia OU  Done in [place] after [place]      S/P yag cap od        Plan:  H/O red / irritated eys   Resolved off all gtts and IOP is still ok post ahmed   IOP ok  od off all gtts - had ahmed od 3/2/2016 - IOP ok post op   Intolerant to all glaucoma gtts       PsExGl od - severe / POAG os - mild  - very assymetric c/d OU- mildly thin corneas- open on gonio- +family history  Patient was started on glaucoma treatment after [place]- went to [place] after [place]- now back here  Was seen by Dr. Blake ([facility])  and Dr. Ontiveros and sent for further glaucoma eval  HVF show SAD OU however appears to have worsened OS- patient with significant ptosis OS- could be lid artifact?-   Optic disc OS does not appear to be as bad as HVF shows- looks very healthy    ++ APD od - old    Seeing [facility] - PED - s/p avastin x 2 ou - keep F/u appt for ? More avastin   VA os improving  + armd - see OCT done 4/7/2015 // PED w/ SR fluid ou   S/P last avastin 1/26/2016 (VA was poor od at 20/200E@6' and much better os at that visit)   Last visit with [facility] 12/7/2018 - is to F/U in 6 months       S/P  ahmed implant od 3/2/2016   Intol to all gtts   Off PG's per [facility]  Intol to dorzolamide - dermatitis  Intol to  alphagan - marked hyperemia  Intol to willard - hypermia   -intol to BB - low BP and HR     s/p GDD / ahmed od to try and control IOP od on 3/2/16 ++ PsExGl)   -Pressure doing well today at 16 OD    Currently on no drops      F/U 4 months - with iop CHECK - HRT     Saw Boudreax 8/2016 - no need for glasses     Keep F/u with beth - monitor armd - in good left eye

## 2019-10-07 ENCOUNTER — OFFICE VISIT (OUTPATIENT)
Dept: OPHTHALMOLOGY | Facility: CLINIC | Age: 84
End: 2019-10-07
Payer: MEDICARE

## 2019-10-07 DIAGNOSIS — F03.90 DEMENTIA WITHOUT BEHAVIORAL DISTURBANCE, UNSPECIFIED DEMENTIA TYPE: ICD-10-CM

## 2019-10-07 DIAGNOSIS — H40.1413 PSEUDOEXFOLIATIVE GLAUCOMA, RIGHT EYE, SEVERE STAGE: Primary | ICD-10-CM

## 2019-10-07 DIAGNOSIS — H40.1122 PRIMARY OPEN ANGLE GLAUCOMA OF LEFT EYE, MODERATE STAGE: ICD-10-CM

## 2019-10-07 DIAGNOSIS — H21.561 AFFERENT PUPILLARY DEFECT, RIGHT: ICD-10-CM

## 2019-10-07 DIAGNOSIS — H35.3231 EXUDATIVE AGE-RELATED MACULAR DEGENERATION, BILATERAL, WITH ACTIVE CHOROIDAL NEOVASCULARIZATION: ICD-10-CM

## 2019-10-07 DIAGNOSIS — Z96.89 HISTORY OF GLAUCOMA TUBE SHUNT PROCEDURE: ICD-10-CM

## 2019-10-07 DIAGNOSIS — Z96.1 PSEUDOPHAKIA OF BOTH EYES: ICD-10-CM

## 2019-10-07 PROCEDURE — 99999 PR PBB SHADOW E&M-EST. PATIENT-LVL II: ICD-10-PCS | Mod: PBBFAC,,, | Performed by: OPHTHALMOLOGY

## 2019-10-07 PROCEDURE — 99212 OFFICE O/P EST SF 10 MIN: CPT | Mod: PBBFAC,25 | Performed by: OPHTHALMOLOGY

## 2019-10-07 PROCEDURE — 99999 PR PBB SHADOW E&M-EST. PATIENT-LVL II: CPT | Mod: PBBFAC,,, | Performed by: OPHTHALMOLOGY

## 2019-10-07 PROCEDURE — 92012 PR EYE EXAM, EST PATIENT,INTERMED: ICD-10-PCS | Mod: S$PBB,,, | Performed by: OPHTHALMOLOGY

## 2019-10-07 PROCEDURE — 92012 INTRM OPH EXAM EST PATIENT: CPT | Mod: S$PBB,,, | Performed by: OPHTHALMOLOGY

## 2019-10-07 PROCEDURE — 92020 GONIOSCOPY: CPT | Mod: PBBFAC | Performed by: OPHTHALMOLOGY

## 2019-10-07 PROCEDURE — 92020 PR SPECIAL EYE EVAL,GONIOSCOPY: ICD-10-PCS | Mod: S$PBB,,, | Performed by: OPHTHALMOLOGY

## 2019-10-07 PROCEDURE — 92020 GONIOSCOPY: CPT | Mod: S$PBB,,, | Performed by: OPHTHALMOLOGY

## 2019-10-08 DIAGNOSIS — F03.90 DEMENTIA WITHOUT BEHAVIORAL DISTURBANCE, UNSPECIFIED DEMENTIA TYPE: ICD-10-CM

## 2019-10-08 RX ORDER — DONEPEZIL HYDROCHLORIDE 10 MG/1
TABLET, FILM COATED ORAL
Qty: 30 TABLET | Refills: 0 | Status: SHIPPED | OUTPATIENT
Start: 2019-10-08 | End: 2019-10-08 | Stop reason: SDUPTHER

## 2019-10-08 RX ORDER — MEMANTINE HYDROCHLORIDE 28 MG/1
CAPSULE, EXTENDED RELEASE ORAL
Qty: 30 CAPSULE | Refills: 8 | Status: SHIPPED | OUTPATIENT
Start: 2019-10-08 | End: 2019-10-14 | Stop reason: SDUPTHER

## 2019-10-08 RX ORDER — DONEPEZIL HYDROCHLORIDE 10 MG/1
10 TABLET, FILM COATED ORAL DAILY
Qty: 30 TABLET | Refills: 8 | Status: SHIPPED | OUTPATIENT
Start: 2019-10-08 | End: 2019-10-14 | Stop reason: SDUPTHER

## 2019-10-08 RX ORDER — MEMANTINE HYDROCHLORIDE 28 MG/1
CAPSULE, EXTENDED RELEASE ORAL
Qty: 30 CAPSULE | Refills: 0 | Status: SHIPPED | OUTPATIENT
Start: 2019-10-08 | End: 2019-10-08 | Stop reason: SDUPTHER

## 2019-10-09 DIAGNOSIS — I95.9 HYPOTENSION, UNSPECIFIED HYPOTENSION TYPE: ICD-10-CM

## 2019-10-10 RX ORDER — MIDODRINE HYDROCHLORIDE 10 MG/1
10 TABLET ORAL 3 TIMES DAILY
Qty: 270 TABLET | Refills: 1 | Status: SHIPPED | OUTPATIENT
Start: 2019-10-10

## 2019-10-13 ENCOUNTER — PATIENT MESSAGE (OUTPATIENT)
Dept: UROLOGY | Facility: CLINIC | Age: 84
End: 2019-10-13

## 2019-10-14 ENCOUNTER — TELEPHONE (OUTPATIENT)
Dept: FAMILY MEDICINE | Facility: CLINIC | Age: 84
End: 2019-10-14

## 2019-10-14 DIAGNOSIS — F03.90 DEMENTIA WITHOUT BEHAVIORAL DISTURBANCE, UNSPECIFIED DEMENTIA TYPE: ICD-10-CM

## 2019-10-14 RX ORDER — MEMANTINE HYDROCHLORIDE 28 MG/1
CAPSULE, EXTENDED RELEASE ORAL
Qty: 30 CAPSULE | Refills: 2 | Status: SHIPPED | OUTPATIENT
Start: 2019-10-14 | End: 2020-11-09

## 2019-10-14 RX ORDER — DONEPEZIL HYDROCHLORIDE 10 MG/1
10 TABLET, FILM COATED ORAL DAILY
Qty: 30 TABLET | Refills: 2 | Status: SHIPPED | OUTPATIENT
Start: 2019-10-14

## 2019-10-14 NOTE — TELEPHONE ENCOUNTER
Daughter states her parents are moving into assisted living facility in [place] and is needing letter stating they are capable of performing ADL's without any restrictions; also needing med list faxed to [phone] attn Sophie Dunaway

## 2019-10-14 NOTE — TELEPHONE ENCOUNTER
----- Message from Annabelle Kumar sent at 10/14/2019  1:43 PM CDT -----  Contact: gabrielle Marcum [phone]  Type: Patient Call Back    Who called: gabrielle Marcum     What is the request in detail: Calling to find out if a medication list and a letter can be sent to an [facility]. [facility] in [place], [place], where the patient will be relocating. Daughter lives in [place], and parents will be moving. If Dr Hendricks need to call the [facility], the ph number is [phone]. Daughter is calling to find out what parents need to do to get this done. Please call.     Would the patient rather a call back or a response via [facility]? Call back    Best call back number: [phone]

## 2019-11-07 ENCOUNTER — HEALTH MAINTENANCE LETTER (OUTPATIENT)
Age: 84
End: 2019-11-07

## 2019-11-27 ENCOUNTER — TELEPHONE (OUTPATIENT)
Dept: FAMILY MEDICINE | Facility: CLINIC | Age: 84
End: 2019-11-27

## 2019-11-27 NOTE — TELEPHONE ENCOUNTER
----- Message from Avani Stroud sent at 11/27/2019  3:13 PM CST -----  Contact: Kallie/ Daughter/  [phone]  Type: Patient Call Back    Who called:  Kallie/ Daughter    What is the request in detail:  Patient is moving into an assist living and the facility is needing doctors orders and notes from 3 to 6 months.  Thank you    Would the patient rather a call back or a response via [facility]? Call back    Best call back number:  [phone]    Please fax to [phone]  [facility]

## 2019-11-27 NOTE — TELEPHONE ENCOUNTER
Patient daughter notified LOS from facility needed to send records for patient, Kallie verbalized understanding

## 2019-12-05 ENCOUNTER — OFFICE VISIT - HEALTHEAST (OUTPATIENT)
Dept: GERIATRICS | Facility: CLINIC | Age: 84
End: 2019-12-05

## 2019-12-05 ENCOUNTER — AMBULATORY - HEALTHEAST (OUTPATIENT)
Dept: GERIATRICS | Facility: CLINIC | Age: 84
End: 2019-12-05

## 2019-12-05 DIAGNOSIS — G89.29 CHRONIC BILATERAL LOW BACK PAIN WITHOUT SCIATICA: ICD-10-CM

## 2019-12-05 DIAGNOSIS — M79.604 CHRONIC PAIN OF RIGHT LOWER EXTREMITY: ICD-10-CM

## 2019-12-05 DIAGNOSIS — M54.50 CHRONIC BILATERAL LOW BACK PAIN WITHOUT SCIATICA: ICD-10-CM

## 2019-12-05 DIAGNOSIS — I95.9 HYPOTENSION, UNSPECIFIED HYPOTENSION TYPE: ICD-10-CM

## 2019-12-05 DIAGNOSIS — G89.29 CHRONIC PAIN OF RIGHT LOWER EXTREMITY: ICD-10-CM

## 2019-12-05 DIAGNOSIS — G62.9 NEUROPATHY: ICD-10-CM

## 2019-12-05 DIAGNOSIS — R41.89 COGNITIVE IMPAIRMENT: ICD-10-CM

## 2019-12-05 DIAGNOSIS — E78.5 HYPERLIPIDEMIA, UNSPECIFIED HYPERLIPIDEMIA TYPE: ICD-10-CM

## 2020-01-02 ENCOUNTER — OFFICE VISIT - HEALTHEAST (OUTPATIENT)
Dept: GERIATRICS | Facility: CLINIC | Age: 85
End: 2020-01-02

## 2020-01-02 DIAGNOSIS — S82.891D CLOSED FRACTURE OF BOTH ANKLES WITH ROUTINE HEALING, SUBSEQUENT ENCOUNTER: ICD-10-CM

## 2020-01-02 DIAGNOSIS — R55 VASOVAGAL SYNCOPE: ICD-10-CM

## 2020-01-02 DIAGNOSIS — R41.89 COGNITIVE IMPAIRMENT: ICD-10-CM

## 2020-01-02 DIAGNOSIS — G62.9 NEUROPATHY: ICD-10-CM

## 2020-01-02 DIAGNOSIS — S82.892D CLOSED FRACTURE OF BOTH ANKLES WITH ROUTINE HEALING, SUBSEQUENT ENCOUNTER: ICD-10-CM

## 2020-01-07 ENCOUNTER — OFFICE VISIT - HEALTHEAST (OUTPATIENT)
Dept: GERIATRICS | Facility: CLINIC | Age: 85
End: 2020-01-07

## 2020-01-07 DIAGNOSIS — S82.891D CLOSED FRACTURE OF BOTH ANKLES WITH ROUTINE HEALING, SUBSEQUENT ENCOUNTER: ICD-10-CM

## 2020-01-07 DIAGNOSIS — G62.9 NEUROPATHY: ICD-10-CM

## 2020-01-07 DIAGNOSIS — R41.89 COGNITIVE IMPAIRMENT: ICD-10-CM

## 2020-01-07 DIAGNOSIS — I95.9 HYPOTENSION, UNSPECIFIED HYPOTENSION TYPE: ICD-10-CM

## 2020-01-07 DIAGNOSIS — S82.892D CLOSED FRACTURE OF BOTH ANKLES WITH ROUTINE HEALING, SUBSEQUENT ENCOUNTER: ICD-10-CM

## 2020-01-09 ENCOUNTER — OFFICE VISIT - HEALTHEAST (OUTPATIENT)
Dept: GERIATRICS | Facility: CLINIC | Age: 85
End: 2020-01-09

## 2020-01-09 DIAGNOSIS — R41.89 COGNITIVE IMPAIRMENT: ICD-10-CM

## 2020-01-09 DIAGNOSIS — S82.891D CLOSED FRACTURE OF BOTH ANKLES WITH ROUTINE HEALING, SUBSEQUENT ENCOUNTER: ICD-10-CM

## 2020-01-09 DIAGNOSIS — I95.9 HYPOTENSION, UNSPECIFIED HYPOTENSION TYPE: ICD-10-CM

## 2020-01-09 DIAGNOSIS — G62.9 NEUROPATHY: ICD-10-CM

## 2020-01-09 DIAGNOSIS — S82.892D CLOSED FRACTURE OF BOTH ANKLES WITH ROUTINE HEALING, SUBSEQUENT ENCOUNTER: ICD-10-CM

## 2020-01-13 RX ORDER — ROSUVASTATIN CALCIUM 10 MG/1
TABLET, COATED ORAL
Qty: 90 TABLET | Refills: 1 | Status: SHIPPED | OUTPATIENT
Start: 2020-01-13

## 2020-01-14 ENCOUNTER — OFFICE VISIT - HEALTHEAST (OUTPATIENT)
Dept: GERIATRICS | Facility: CLINIC | Age: 85
End: 2020-01-14

## 2020-01-14 DIAGNOSIS — G62.9 NEUROPATHY: ICD-10-CM

## 2020-01-14 DIAGNOSIS — R41.89 COGNITIVE IMPAIRMENT: ICD-10-CM

## 2020-01-14 DIAGNOSIS — S82.892D CLOSED FRACTURE OF BOTH ANKLES WITH ROUTINE HEALING, SUBSEQUENT ENCOUNTER: ICD-10-CM

## 2020-01-14 DIAGNOSIS — I95.9 HYPOTENSION, UNSPECIFIED HYPOTENSION TYPE: ICD-10-CM

## 2020-01-14 DIAGNOSIS — S82.891D CLOSED FRACTURE OF BOTH ANKLES WITH ROUTINE HEALING, SUBSEQUENT ENCOUNTER: ICD-10-CM

## 2020-01-16 ENCOUNTER — OFFICE VISIT - HEALTHEAST (OUTPATIENT)
Dept: GERIATRICS | Facility: CLINIC | Age: 85
End: 2020-01-16

## 2020-01-16 DIAGNOSIS — S82.892D CLOSED FRACTURE OF BOTH ANKLES WITH ROUTINE HEALING, SUBSEQUENT ENCOUNTER: ICD-10-CM

## 2020-01-16 DIAGNOSIS — G62.9 NEUROPATHY: ICD-10-CM

## 2020-01-16 DIAGNOSIS — R53.81 PHYSICAL DEBILITY: ICD-10-CM

## 2020-01-16 DIAGNOSIS — I95.9 HYPOTENSION, UNSPECIFIED HYPOTENSION TYPE: ICD-10-CM

## 2020-01-16 DIAGNOSIS — S82.891D CLOSED FRACTURE OF BOTH ANKLES WITH ROUTINE HEALING, SUBSEQUENT ENCOUNTER: ICD-10-CM

## 2020-01-21 ENCOUNTER — OFFICE VISIT - HEALTHEAST (OUTPATIENT)
Dept: GERIATRICS | Facility: CLINIC | Age: 85
End: 2020-01-21

## 2020-01-21 DIAGNOSIS — R41.89 COGNITIVE IMPAIRMENT: ICD-10-CM

## 2020-01-21 DIAGNOSIS — I95.9 HYPOTENSION, UNSPECIFIED HYPOTENSION TYPE: ICD-10-CM

## 2020-01-21 DIAGNOSIS — M54.50 CHRONIC BILATERAL LOW BACK PAIN WITHOUT SCIATICA: ICD-10-CM

## 2020-01-21 DIAGNOSIS — S82.892D CLOSED FRACTURE OF BOTH ANKLES WITH ROUTINE HEALING, SUBSEQUENT ENCOUNTER: ICD-10-CM

## 2020-01-21 DIAGNOSIS — G89.29 CHRONIC BILATERAL LOW BACK PAIN WITHOUT SCIATICA: ICD-10-CM

## 2020-01-21 DIAGNOSIS — S82.891D CLOSED FRACTURE OF BOTH ANKLES WITH ROUTINE HEALING, SUBSEQUENT ENCOUNTER: ICD-10-CM

## 2020-01-28 ENCOUNTER — OFFICE VISIT - HEALTHEAST (OUTPATIENT)
Dept: GERIATRICS | Facility: CLINIC | Age: 85
End: 2020-01-28

## 2020-01-28 DIAGNOSIS — G62.9 NEUROPATHY: ICD-10-CM

## 2020-01-28 DIAGNOSIS — R41.89 COGNITIVE IMPAIRMENT: ICD-10-CM

## 2020-01-28 DIAGNOSIS — I95.9 HYPOTENSION, UNSPECIFIED HYPOTENSION TYPE: ICD-10-CM

## 2020-01-28 DIAGNOSIS — S82.891D CLOSED FRACTURE OF BOTH ANKLES WITH ROUTINE HEALING, SUBSEQUENT ENCOUNTER: ICD-10-CM

## 2020-01-28 DIAGNOSIS — S82.892D CLOSED FRACTURE OF BOTH ANKLES WITH ROUTINE HEALING, SUBSEQUENT ENCOUNTER: ICD-10-CM

## 2020-01-30 ENCOUNTER — OFFICE VISIT - HEALTHEAST (OUTPATIENT)
Dept: GERIATRICS | Facility: CLINIC | Age: 85
End: 2020-01-30

## 2020-01-30 DIAGNOSIS — G62.9 NEUROPATHY: ICD-10-CM

## 2020-01-30 DIAGNOSIS — S82.892D CLOSED FRACTURE OF BOTH ANKLES WITH ROUTINE HEALING, SUBSEQUENT ENCOUNTER: ICD-10-CM

## 2020-01-30 DIAGNOSIS — R41.89 COGNITIVE IMPAIRMENT: ICD-10-CM

## 2020-01-30 DIAGNOSIS — S82.891D CLOSED FRACTURE OF BOTH ANKLES WITH ROUTINE HEALING, SUBSEQUENT ENCOUNTER: ICD-10-CM

## 2020-01-30 DIAGNOSIS — I95.9 HYPOTENSION, UNSPECIFIED HYPOTENSION TYPE: ICD-10-CM

## 2020-02-04 ENCOUNTER — OFFICE VISIT - HEALTHEAST (OUTPATIENT)
Dept: GERIATRICS | Facility: CLINIC | Age: 85
End: 2020-02-04

## 2020-02-04 DIAGNOSIS — I95.9 HYPOTENSION, UNSPECIFIED HYPOTENSION TYPE: ICD-10-CM

## 2020-02-04 DIAGNOSIS — R41.89 COGNITIVE IMPAIRMENT: ICD-10-CM

## 2020-02-04 DIAGNOSIS — S82.891D CLOSED FRACTURE OF BOTH ANKLES WITH ROUTINE HEALING, SUBSEQUENT ENCOUNTER: ICD-10-CM

## 2020-02-04 DIAGNOSIS — S82.892D CLOSED FRACTURE OF BOTH ANKLES WITH ROUTINE HEALING, SUBSEQUENT ENCOUNTER: ICD-10-CM

## 2020-02-04 DIAGNOSIS — G62.9 NEUROPATHY: ICD-10-CM

## 2020-02-06 ENCOUNTER — OFFICE VISIT - HEALTHEAST (OUTPATIENT)
Dept: GERIATRICS | Facility: CLINIC | Age: 85
End: 2020-02-06

## 2020-02-06 DIAGNOSIS — G62.9 NEUROPATHY: ICD-10-CM

## 2020-02-06 DIAGNOSIS — R41.89 COGNITIVE IMPAIRMENT: ICD-10-CM

## 2020-02-06 DIAGNOSIS — S82.891D CLOSED FRACTURE OF BOTH ANKLES WITH ROUTINE HEALING, SUBSEQUENT ENCOUNTER: ICD-10-CM

## 2020-02-06 DIAGNOSIS — I95.9 HYPOTENSION, UNSPECIFIED HYPOTENSION TYPE: ICD-10-CM

## 2020-02-06 DIAGNOSIS — S82.892D CLOSED FRACTURE OF BOTH ANKLES WITH ROUTINE HEALING, SUBSEQUENT ENCOUNTER: ICD-10-CM

## 2020-02-11 ENCOUNTER — OFFICE VISIT - HEALTHEAST (OUTPATIENT)
Dept: GERIATRICS | Facility: CLINIC | Age: 85
End: 2020-02-11

## 2020-02-11 DIAGNOSIS — I95.9 HYPOTENSION, UNSPECIFIED HYPOTENSION TYPE: ICD-10-CM

## 2020-02-11 DIAGNOSIS — S82.892D CLOSED FRACTURE OF BOTH ANKLES WITH ROUTINE HEALING, SUBSEQUENT ENCOUNTER: ICD-10-CM

## 2020-02-11 DIAGNOSIS — G62.9 NEUROPATHY: ICD-10-CM

## 2020-02-11 DIAGNOSIS — S82.891D CLOSED FRACTURE OF BOTH ANKLES WITH ROUTINE HEALING, SUBSEQUENT ENCOUNTER: ICD-10-CM

## 2020-02-11 DIAGNOSIS — R41.89 COGNITIVE IMPAIRMENT: ICD-10-CM

## 2020-02-12 ENCOUNTER — RECORDS - HEALTHEAST (OUTPATIENT)
Dept: LAB | Facility: CLINIC | Age: 85
End: 2020-02-12

## 2020-02-12 LAB
CHOLEST SERPL-MCNC: 132 MG/DL
FASTING STATUS PATIENT QL REPORTED: ABNORMAL
HDLC SERPL-MCNC: 39 MG/DL
LDLC SERPL CALC-MCNC: 56 MG/DL
TRIGL SERPL-MCNC: 185 MG/DL

## 2020-02-13 ENCOUNTER — OFFICE VISIT - HEALTHEAST (OUTPATIENT)
Dept: GERIATRICS | Facility: CLINIC | Age: 85
End: 2020-02-13

## 2020-02-13 DIAGNOSIS — S82.892D CLOSED FRACTURE OF BOTH ANKLES WITH ROUTINE HEALING, SUBSEQUENT ENCOUNTER: ICD-10-CM

## 2020-02-13 DIAGNOSIS — S82.891D CLOSED FRACTURE OF BOTH ANKLES WITH ROUTINE HEALING, SUBSEQUENT ENCOUNTER: ICD-10-CM

## 2020-02-13 DIAGNOSIS — R55 VASOVAGAL SYNCOPE: ICD-10-CM

## 2020-02-13 DIAGNOSIS — G62.9 NEUROPATHY: ICD-10-CM

## 2020-02-13 DIAGNOSIS — R41.89 COGNITIVE IMPAIRMENT: ICD-10-CM

## 2020-02-17 ENCOUNTER — HEALTH MAINTENANCE LETTER (OUTPATIENT)
Age: 85
End: 2020-02-17

## 2020-02-18 ENCOUNTER — OFFICE VISIT - HEALTHEAST (OUTPATIENT)
Dept: GERIATRICS | Facility: CLINIC | Age: 85
End: 2020-02-18

## 2020-02-18 DIAGNOSIS — R41.89 COGNITIVE IMPAIRMENT: ICD-10-CM

## 2020-02-18 DIAGNOSIS — R55 VASOVAGAL SYNCOPE: ICD-10-CM

## 2020-02-18 DIAGNOSIS — S82.891D CLOSED FRACTURE OF BOTH ANKLES WITH ROUTINE HEALING, SUBSEQUENT ENCOUNTER: ICD-10-CM

## 2020-02-18 DIAGNOSIS — S82.892D CLOSED FRACTURE OF BOTH ANKLES WITH ROUTINE HEALING, SUBSEQUENT ENCOUNTER: ICD-10-CM

## 2020-02-18 DIAGNOSIS — I95.9 HYPOTENSION, UNSPECIFIED HYPOTENSION TYPE: ICD-10-CM

## 2020-02-20 ENCOUNTER — OFFICE VISIT - HEALTHEAST (OUTPATIENT)
Dept: GERIATRICS | Facility: CLINIC | Age: 85
End: 2020-02-20

## 2020-02-20 DIAGNOSIS — S82.891D CLOSED FRACTURE OF BOTH ANKLES WITH ROUTINE HEALING, SUBSEQUENT ENCOUNTER: ICD-10-CM

## 2020-02-20 DIAGNOSIS — R41.89 COGNITIVE IMPAIRMENT: ICD-10-CM

## 2020-02-20 DIAGNOSIS — S82.892D CLOSED FRACTURE OF BOTH ANKLES WITH ROUTINE HEALING, SUBSEQUENT ENCOUNTER: ICD-10-CM

## 2020-02-20 DIAGNOSIS — I95.9 HYPOTENSION, UNSPECIFIED HYPOTENSION TYPE: ICD-10-CM

## 2020-02-20 DIAGNOSIS — G89.29 CHRONIC BILATERAL LOW BACK PAIN WITHOUT SCIATICA: ICD-10-CM

## 2020-02-20 DIAGNOSIS — M54.50 CHRONIC BILATERAL LOW BACK PAIN WITHOUT SCIATICA: ICD-10-CM

## 2020-02-25 ENCOUNTER — OFFICE VISIT - HEALTHEAST (OUTPATIENT)
Dept: GERIATRICS | Facility: CLINIC | Age: 85
End: 2020-02-25

## 2020-02-25 DIAGNOSIS — S82.892D CLOSED FRACTURE OF BOTH ANKLES WITH ROUTINE HEALING, SUBSEQUENT ENCOUNTER: ICD-10-CM

## 2020-02-25 DIAGNOSIS — G62.9 NEUROPATHY: ICD-10-CM

## 2020-02-25 DIAGNOSIS — S82.891D CLOSED FRACTURE OF BOTH ANKLES WITH ROUTINE HEALING, SUBSEQUENT ENCOUNTER: ICD-10-CM

## 2020-02-25 DIAGNOSIS — R41.89 COGNITIVE IMPAIRMENT: ICD-10-CM

## 2020-02-25 DIAGNOSIS — I95.9 HYPOTENSION, UNSPECIFIED HYPOTENSION TYPE: ICD-10-CM

## 2020-02-26 ENCOUNTER — COMMUNICATION - HEALTHEAST (OUTPATIENT)
Dept: GERIATRICS | Facility: CLINIC | Age: 85
End: 2020-02-26

## 2020-02-27 ENCOUNTER — OFFICE VISIT - HEALTHEAST (OUTPATIENT)
Dept: GERIATRICS | Facility: CLINIC | Age: 85
End: 2020-02-27

## 2020-02-27 DIAGNOSIS — M54.50 CHRONIC BILATERAL LOW BACK PAIN WITHOUT SCIATICA: ICD-10-CM

## 2020-02-27 DIAGNOSIS — S82.891D CLOSED FRACTURE OF BOTH ANKLES WITH ROUTINE HEALING, SUBSEQUENT ENCOUNTER: ICD-10-CM

## 2020-02-27 DIAGNOSIS — G62.9 NEUROPATHY: ICD-10-CM

## 2020-02-27 DIAGNOSIS — S82.892D CLOSED FRACTURE OF BOTH ANKLES WITH ROUTINE HEALING, SUBSEQUENT ENCOUNTER: ICD-10-CM

## 2020-02-27 DIAGNOSIS — I95.9 HYPOTENSION, UNSPECIFIED HYPOTENSION TYPE: ICD-10-CM

## 2020-02-27 DIAGNOSIS — G89.29 CHRONIC BILATERAL LOW BACK PAIN WITHOUT SCIATICA: ICD-10-CM

## 2020-03-03 ENCOUNTER — OFFICE VISIT - HEALTHEAST (OUTPATIENT)
Dept: GERIATRICS | Facility: CLINIC | Age: 85
End: 2020-03-03

## 2020-03-03 DIAGNOSIS — S82.892D CLOSED FRACTURE OF BOTH ANKLES WITH ROUTINE HEALING, SUBSEQUENT ENCOUNTER: ICD-10-CM

## 2020-03-03 DIAGNOSIS — G62.9 NEUROPATHY: ICD-10-CM

## 2020-03-03 DIAGNOSIS — R41.89 COGNITIVE IMPAIRMENT: ICD-10-CM

## 2020-03-03 DIAGNOSIS — I95.9 HYPOTENSION, UNSPECIFIED HYPOTENSION TYPE: ICD-10-CM

## 2020-03-03 DIAGNOSIS — S82.891D CLOSED FRACTURE OF BOTH ANKLES WITH ROUTINE HEALING, SUBSEQUENT ENCOUNTER: ICD-10-CM

## 2020-03-05 ENCOUNTER — COMMUNICATION - HEALTHEAST (OUTPATIENT)
Dept: TELEHEALTH | Facility: CLINIC | Age: 85
End: 2020-03-05

## 2020-03-05 ENCOUNTER — OFFICE VISIT - HEALTHEAST (OUTPATIENT)
Dept: GERIATRICS | Facility: CLINIC | Age: 85
End: 2020-03-05

## 2020-03-05 DIAGNOSIS — R41.89 COGNITIVE IMPAIRMENT: ICD-10-CM

## 2020-03-05 DIAGNOSIS — I95.9 HYPOTENSION, UNSPECIFIED HYPOTENSION TYPE: ICD-10-CM

## 2020-03-05 DIAGNOSIS — S82.892D CLOSED FRACTURE OF BOTH ANKLES WITH ROUTINE HEALING, SUBSEQUENT ENCOUNTER: ICD-10-CM

## 2020-03-05 DIAGNOSIS — S82.891D CLOSED FRACTURE OF BOTH ANKLES WITH ROUTINE HEALING, SUBSEQUENT ENCOUNTER: ICD-10-CM

## 2020-03-05 DIAGNOSIS — G62.9 NEUROPATHY: ICD-10-CM

## 2020-03-10 ENCOUNTER — OFFICE VISIT - HEALTHEAST (OUTPATIENT)
Dept: GERIATRICS | Facility: CLINIC | Age: 85
End: 2020-03-10

## 2020-03-10 DIAGNOSIS — S82.892D CLOSED FRACTURE OF BOTH ANKLES WITH ROUTINE HEALING, SUBSEQUENT ENCOUNTER: ICD-10-CM

## 2020-03-10 DIAGNOSIS — G62.9 NEUROPATHY: ICD-10-CM

## 2020-03-10 DIAGNOSIS — R41.89 COGNITIVE IMPAIRMENT: ICD-10-CM

## 2020-03-10 DIAGNOSIS — I95.9 HYPOTENSION, UNSPECIFIED HYPOTENSION TYPE: ICD-10-CM

## 2020-03-10 DIAGNOSIS — S82.891D CLOSED FRACTURE OF BOTH ANKLES WITH ROUTINE HEALING, SUBSEQUENT ENCOUNTER: ICD-10-CM

## 2020-03-12 ENCOUNTER — OFFICE VISIT - HEALTHEAST (OUTPATIENT)
Dept: GERIATRICS | Facility: CLINIC | Age: 85
End: 2020-03-12

## 2020-03-12 DIAGNOSIS — R41.89 COGNITIVE IMPAIRMENT: ICD-10-CM

## 2020-03-12 DIAGNOSIS — S82.892D CLOSED FRACTURE OF BOTH ANKLES WITH ROUTINE HEALING, SUBSEQUENT ENCOUNTER: ICD-10-CM

## 2020-03-12 DIAGNOSIS — G62.9 NEUROPATHY: ICD-10-CM

## 2020-03-12 DIAGNOSIS — S82.891D CLOSED FRACTURE OF BOTH ANKLES WITH ROUTINE HEALING, SUBSEQUENT ENCOUNTER: ICD-10-CM

## 2020-03-12 DIAGNOSIS — I95.9 HYPOTENSION, UNSPECIFIED HYPOTENSION TYPE: ICD-10-CM

## 2020-03-17 ENCOUNTER — OFFICE VISIT - HEALTHEAST (OUTPATIENT)
Dept: GERIATRICS | Facility: CLINIC | Age: 85
End: 2020-03-17

## 2020-03-17 DIAGNOSIS — M54.50 CHRONIC BILATERAL LOW BACK PAIN WITHOUT SCIATICA: ICD-10-CM

## 2020-03-17 DIAGNOSIS — R41.89 COGNITIVE IMPAIRMENT: ICD-10-CM

## 2020-03-17 DIAGNOSIS — G62.9 NEUROPATHY: ICD-10-CM

## 2020-03-17 DIAGNOSIS — S82.891D CLOSED FRACTURE OF BOTH ANKLES WITH ROUTINE HEALING, SUBSEQUENT ENCOUNTER: ICD-10-CM

## 2020-03-17 DIAGNOSIS — S82.892D CLOSED FRACTURE OF BOTH ANKLES WITH ROUTINE HEALING, SUBSEQUENT ENCOUNTER: ICD-10-CM

## 2020-03-17 DIAGNOSIS — G89.29 CHRONIC BILATERAL LOW BACK PAIN WITHOUT SCIATICA: ICD-10-CM

## 2020-03-18 ENCOUNTER — AMBULATORY - HEALTHEAST (OUTPATIENT)
Dept: GERIATRICS | Facility: CLINIC | Age: 85
End: 2020-03-18

## 2020-03-18 ENCOUNTER — COMMUNICATION - HEALTHEAST (OUTPATIENT)
Dept: INTERNAL MEDICINE | Facility: CLINIC | Age: 85
End: 2020-03-18

## 2020-03-23 ENCOUNTER — COMMUNICATION - HEALTHEAST (OUTPATIENT)
Dept: GERIATRICS | Facility: CLINIC | Age: 85
End: 2020-03-23

## 2020-03-23 ENCOUNTER — OFFICE VISIT - HEALTHEAST (OUTPATIENT)
Dept: INTERNAL MEDICINE | Facility: CLINIC | Age: 85
End: 2020-03-23

## 2020-03-23 DIAGNOSIS — S82.892D CLOSED FRACTURE OF BOTH ANKLES WITH ROUTINE HEALING, SUBSEQUENT ENCOUNTER: ICD-10-CM

## 2020-03-23 DIAGNOSIS — M54.50 CHRONIC BILATERAL LOW BACK PAIN WITHOUT SCIATICA: ICD-10-CM

## 2020-03-23 DIAGNOSIS — G89.29 CHRONIC BILATERAL LOW BACK PAIN WITHOUT SCIATICA: ICD-10-CM

## 2020-03-23 DIAGNOSIS — E78.5 HYPERLIPIDEMIA, UNSPECIFIED HYPERLIPIDEMIA TYPE: ICD-10-CM

## 2020-03-23 DIAGNOSIS — S82.891D CLOSED FRACTURE OF BOTH ANKLES WITH ROUTINE HEALING, SUBSEQUENT ENCOUNTER: ICD-10-CM

## 2020-03-23 DIAGNOSIS — R41.89 COGNITIVE IMPAIRMENT: ICD-10-CM

## 2020-03-23 DIAGNOSIS — R32: ICD-10-CM

## 2020-03-23 DIAGNOSIS — R55 VASOVAGAL SYNCOPE: ICD-10-CM

## 2020-03-30 ENCOUNTER — RECORDS - HEALTHEAST (OUTPATIENT)
Dept: ADMINISTRATIVE | Facility: OTHER | Age: 85
End: 2020-03-30

## 2020-04-01 ENCOUNTER — COMMUNICATION - HEALTHEAST (OUTPATIENT)
Dept: INTERNAL MEDICINE | Facility: CLINIC | Age: 85
End: 2020-04-01

## 2020-04-03 ENCOUNTER — RECORDS - HEALTHEAST (OUTPATIENT)
Dept: ADMINISTRATIVE | Facility: OTHER | Age: 85
End: 2020-04-03

## 2020-04-16 ENCOUNTER — AMBULATORY - HEALTHEAST (OUTPATIENT)
Dept: OTHER | Facility: CLINIC | Age: 85
End: 2020-04-16

## 2020-08-11 ENCOUNTER — TELEPHONE (OUTPATIENT)
Dept: PAIN MEDICINE | Facility: CLINIC | Age: 85
End: 2020-08-11

## 2020-08-11 ENCOUNTER — PATIENT OUTREACH (OUTPATIENT)
Dept: ADMINISTRATIVE | Facility: HOSPITAL | Age: 85
End: 2020-08-11

## 2020-08-11 DIAGNOSIS — M51.36 DDD (DEGENERATIVE DISC DISEASE), LUMBAR: ICD-10-CM

## 2020-08-11 DIAGNOSIS — M48.062 SPINAL STENOSIS OF LUMBAR REGION WITH NEUROGENIC CLAUDICATION: ICD-10-CM

## 2020-08-11 DIAGNOSIS — M47.816 LUMBAR SPONDYLOSIS: ICD-10-CM

## 2020-08-11 RX ORDER — GABAPENTIN 300 MG/1
600 CAPSULE ORAL 3 TIMES DAILY
Qty: 180 CAPSULE | Refills: 11 | Status: SHIPPED | OUTPATIENT
Start: 2020-08-11 | End: 2021-08-11

## 2020-08-11 NOTE — TELEPHONE ENCOUNTER
Called pt to schedule OV with Dr. Henry and also Dr. Hendricks . mellissa Bowling for pt to rt call .

## 2020-08-11 NOTE — TELEPHONE ENCOUNTER
----- Message from Anders Henry Jr., MD sent at 8/11/2020  8:47 AM CDT -----  Please let patient know thatI have refilled her gabapentin.  It does not seem that she has seen her primary care physician or myself for about 1 year.  Would best to have her evaluated by myself or her primary care physician if we are going to continue this medication.  Her primary care physician can refill this medication in the future if there is no other reason for her to follow up with me.

## 2020-08-11 NOTE — TELEPHONE ENCOUNTER
Called pt to schedule f/u with Dr. Henry and her pcp . Na from either contacts listed . Left another  for pt to rt call .

## 2020-08-19 ENCOUNTER — PATIENT OUTREACH (OUTPATIENT)
Dept: ADMINISTRATIVE | Facility: HOSPITAL | Age: 85
End: 2020-08-19

## 2020-11-14 ENCOUNTER — AMBULATORY - HEALTHEAST (OUTPATIENT)
Dept: NURSING | Facility: CLINIC | Age: 85
End: 2020-11-14

## 2020-11-29 ENCOUNTER — HEALTH MAINTENANCE LETTER (OUTPATIENT)
Age: 85
End: 2020-11-29

## 2020-12-07 ENCOUNTER — COMMUNICATION - HEALTHEAST (OUTPATIENT)
Dept: INTERNAL MEDICINE | Facility: CLINIC | Age: 85
End: 2020-12-07

## 2020-12-18 ENCOUNTER — COMMUNICATION - HEALTHEAST (OUTPATIENT)
Dept: INTERNAL MEDICINE | Facility: CLINIC | Age: 85
End: 2020-12-18

## 2020-12-18 DIAGNOSIS — M54.50 CHRONIC BILATERAL LOW BACK PAIN WITHOUT SCIATICA: ICD-10-CM

## 2020-12-18 DIAGNOSIS — G89.29 CHRONIC BILATERAL LOW BACK PAIN WITHOUT SCIATICA: ICD-10-CM

## 2021-01-04 ENCOUNTER — COMMUNICATION - HEALTHEAST (OUTPATIENT)
Dept: INTERNAL MEDICINE | Facility: CLINIC | Age: 86
End: 2021-01-04

## 2021-01-04 DIAGNOSIS — M54.50 CHRONIC BILATERAL LOW BACK PAIN WITHOUT SCIATICA: ICD-10-CM

## 2021-01-04 DIAGNOSIS — E78.5 HYPERLIPIDEMIA, UNSPECIFIED HYPERLIPIDEMIA TYPE: ICD-10-CM

## 2021-01-04 DIAGNOSIS — R41.89 COGNITIVE IMPAIRMENT: ICD-10-CM

## 2021-01-04 DIAGNOSIS — G89.29 CHRONIC BILATERAL LOW BACK PAIN WITHOUT SCIATICA: ICD-10-CM

## 2021-01-28 ENCOUNTER — COMMUNICATION - HEALTHEAST (OUTPATIENT)
Dept: INTERNAL MEDICINE | Facility: CLINIC | Age: 86
End: 2021-01-28

## 2021-02-19 ENCOUNTER — AMBULATORY - HEALTHEAST (OUTPATIENT)
Dept: NURSING | Facility: CLINIC | Age: 86
End: 2021-02-19

## 2021-03-08 ENCOUNTER — COMMUNICATION - HEALTHEAST (OUTPATIENT)
Dept: INTERNAL MEDICINE | Facility: CLINIC | Age: 86
End: 2021-03-08

## 2021-03-09 ENCOUNTER — OFFICE VISIT - HEALTHEAST (OUTPATIENT)
Dept: INTERNAL MEDICINE | Facility: CLINIC | Age: 86
End: 2021-03-09

## 2021-03-09 ENCOUNTER — AMBULATORY - HEALTHEAST (OUTPATIENT)
Dept: SCHEDULING | Facility: CLINIC | Age: 86
End: 2021-03-09

## 2021-03-09 DIAGNOSIS — S82.891D CLOSED FRACTURE OF BOTH ANKLES WITH ROUTINE HEALING, SUBSEQUENT ENCOUNTER: ICD-10-CM

## 2021-03-09 DIAGNOSIS — G89.29 CHRONIC BILATERAL LOW BACK PAIN WITHOUT SCIATICA: ICD-10-CM

## 2021-03-09 DIAGNOSIS — M81.0 AGE-RELATED OSTEOPOROSIS WITHOUT CURRENT PATHOLOGICAL FRACTURE: ICD-10-CM

## 2021-03-09 DIAGNOSIS — R32: ICD-10-CM

## 2021-03-09 DIAGNOSIS — E55.9 VITAMIN D INSUFFICIENCY: ICD-10-CM

## 2021-03-09 DIAGNOSIS — M54.50 CHRONIC BILATERAL LOW BACK PAIN WITHOUT SCIATICA: ICD-10-CM

## 2021-03-09 DIAGNOSIS — G30.9 ALZHEIMER'S DEMENTIA WITHOUT BEHAVIORAL DISTURBANCE, UNSPECIFIED TIMING OF DEMENTIA ONSET: ICD-10-CM

## 2021-03-09 DIAGNOSIS — F02.80 ALZHEIMER'S DEMENTIA WITHOUT BEHAVIORAL DISTURBANCE, UNSPECIFIED TIMING OF DEMENTIA ONSET: ICD-10-CM

## 2021-03-09 DIAGNOSIS — S82.892D CLOSED FRACTURE OF BOTH ANKLES WITH ROUTINE HEALING, SUBSEQUENT ENCOUNTER: ICD-10-CM

## 2021-03-09 LAB
ALBUMIN SERPL-MCNC: 4.3 G/DL (ref 3.5–5)
ALP SERPL-CCNC: 78 U/L (ref 45–120)
ALT SERPL W P-5'-P-CCNC: 16 U/L (ref 0–45)
ANION GAP SERPL CALCULATED.3IONS-SCNC: 7 MMOL/L (ref 5–18)
AST SERPL W P-5'-P-CCNC: 22 U/L (ref 0–40)
BASOPHILS # BLD AUTO: 0 THOU/UL (ref 0–0.2)
BASOPHILS NFR BLD AUTO: 0 % (ref 0–2)
BILIRUB SERPL-MCNC: 0.3 MG/DL (ref 0–1)
BUN SERPL-MCNC: 19 MG/DL (ref 8–28)
CALCIUM SERPL-MCNC: 9.4 MG/DL (ref 8.5–10.5)
CHLORIDE BLD-SCNC: 103 MMOL/L (ref 98–107)
CO2 SERPL-SCNC: 30 MMOL/L (ref 22–31)
CREAT SERPL-MCNC: 0.81 MG/DL (ref 0.6–1.1)
EOSINOPHIL # BLD AUTO: 0.1 THOU/UL (ref 0–0.4)
EOSINOPHIL NFR BLD AUTO: 1 % (ref 0–6)
ERYTHROCYTE [DISTWIDTH] IN BLOOD BY AUTOMATED COUNT: 12.5 % (ref 11–14.5)
GFR SERPL CREATININE-BSD FRML MDRD: >60 ML/MIN/1.73M2
GLUCOSE BLD-MCNC: 92 MG/DL (ref 70–125)
HCT VFR BLD AUTO: 40.4 % (ref 35–47)
HGB BLD-MCNC: 12.9 G/DL (ref 12–16)
IMM GRANULOCYTES # BLD: 0 THOU/UL
IMM GRANULOCYTES NFR BLD: 0 %
LYMPHOCYTES # BLD AUTO: 1.4 THOU/UL (ref 0.8–4.4)
LYMPHOCYTES NFR BLD AUTO: 23 % (ref 20–40)
MCH RBC QN AUTO: 29.1 PG (ref 27–34)
MCHC RBC AUTO-ENTMCNC: 31.9 G/DL (ref 32–36)
MCV RBC AUTO: 91 FL (ref 80–100)
MONOCYTES # BLD AUTO: 0.5 THOU/UL (ref 0–0.9)
MONOCYTES NFR BLD AUTO: 8 % (ref 2–10)
NEUTROPHILS # BLD AUTO: 4 THOU/UL (ref 2–7.7)
NEUTROPHILS NFR BLD AUTO: 67 % (ref 50–70)
PLATELET # BLD AUTO: 218 THOU/UL (ref 140–440)
PMV BLD AUTO: 8.9 FL (ref 7–10)
POTASSIUM BLD-SCNC: 4.5 MMOL/L (ref 3.5–5)
PROT SERPL-MCNC: 7.2 G/DL (ref 6–8)
RBC # BLD AUTO: 4.43 MILL/UL (ref 3.8–5.4)
SODIUM SERPL-SCNC: 140 MMOL/L (ref 136–145)
TSH SERPL DL<=0.005 MIU/L-ACNC: 1.35 UIU/ML (ref 0.3–5)
WBC: 6 THOU/UL (ref 4–11)

## 2021-03-10 LAB
25(OH)D3 SERPL-MCNC: 21.9 NG/ML (ref 30–80)
25(OH)D3 SERPL-MCNC: 21.9 NG/ML (ref 30–80)

## 2021-03-12 ENCOUNTER — AMBULATORY - HEALTHEAST (OUTPATIENT)
Dept: NURSING | Facility: CLINIC | Age: 86
End: 2021-03-12

## 2021-03-12 LAB — METHYLMALONATE SERPL-SCNC: 0.26 UMOL/L (ref 0–0.4)

## 2021-05-01 ENCOUNTER — HEALTH MAINTENANCE LETTER (OUTPATIENT)
Age: 86
End: 2021-05-01

## 2021-05-27 VITALS
TEMPERATURE: 97.8 F | DIASTOLIC BLOOD PRESSURE: 59 MMHG | HEART RATE: 71 BPM | RESPIRATION RATE: 16 BRPM | OXYGEN SATURATION: 96 % | SYSTOLIC BLOOD PRESSURE: 109 MMHG

## 2021-06-02 ENCOUNTER — COMMUNICATION - HEALTHEAST (OUTPATIENT)
Dept: INTERNAL MEDICINE | Facility: CLINIC | Age: 86
End: 2021-06-02

## 2021-06-02 DIAGNOSIS — G30.9 ALZHEIMER'S DEMENTIA WITHOUT BEHAVIORAL DISTURBANCE, UNSPECIFIED TIMING OF DEMENTIA ONSET: ICD-10-CM

## 2021-06-02 DIAGNOSIS — F02.80 ALZHEIMER'S DEMENTIA WITHOUT BEHAVIORAL DISTURBANCE, UNSPECIFIED TIMING OF DEMENTIA ONSET: ICD-10-CM

## 2021-06-03 ENCOUNTER — COMMUNICATION - HEALTHEAST (OUTPATIENT)
Dept: INTERNAL MEDICINE | Facility: CLINIC | Age: 86
End: 2021-06-03

## 2021-06-03 DIAGNOSIS — G30.9 ALZHEIMER'S DEMENTIA WITHOUT BEHAVIORAL DISTURBANCE, UNSPECIFIED TIMING OF DEMENTIA ONSET: ICD-10-CM

## 2021-06-03 DIAGNOSIS — F02.80 ALZHEIMER'S DEMENTIA WITHOUT BEHAVIORAL DISTURBANCE, UNSPECIFIED TIMING OF DEMENTIA ONSET: ICD-10-CM

## 2021-06-03 DIAGNOSIS — M54.50 CHRONIC BILATERAL LOW BACK PAIN WITHOUT SCIATICA: ICD-10-CM

## 2021-06-03 DIAGNOSIS — G89.29 CHRONIC BILATERAL LOW BACK PAIN WITHOUT SCIATICA: ICD-10-CM

## 2021-06-04 NOTE — PROGRESS NOTES
[facility]      Facility:    [facility] [[id number]]  Code Status: DNR      Chief Complaint/Reason for Visit:  Chief Complaint   Patient presents with     Problem Visit     asked to see       HPI:   Kat is a 84 y.o. female who who I had the opportunity to revisit with secondary to her hospital admission December 29, 2019 through January 1, 2020 secondary to syncope with multiple falls.  The left ankle x-ray did show a nondisplaced acute transverse fracture distal meta physis left fibula just inferior to the tibiofibular syndesmotic joint which remains intact.  Normal alignment.  Soft tissue swelling.  The right ankle x-ray did show a nondisplaced transverse fracture distal meta physis right fibula just inferior to the right tibiofibular syndesmotic joint.  Soft tissue swelling.  Nonsurgical.  Cam boots and nonweightbearing.  The syncope did have an unclear etiology it did sound like vasovagal no history of cardiac disease.  She was admitted to cardiac telemetry the serial troponins were negative.  She apparently had 2 episodes of syncope that were unwitnessed in the early morning of her admission.  Her  did hear a fall and found his wife on the floor.  No seizure-like activity was noted.  Then second episode occurred after breakfast which was witnessed by the spouse.  The patient does not recall any events.  No other new health changes and no other new medication changes.  The laboratory studies on December 2019 show a sodium of 140 potassium 4.5 BUN of 16 creatinine 1.01 albumin 3.9 unremarkable liver enzymes magnesium 2.1.  White cells 8.9 hemoglobin 12.5 and platelets 197.  She currently is in the transitional care unit.  I remember her from my previous admission to the assisted living facility in the beginning of December.  While on the transitional care and her blood pressures have been ranging 119-132 systolically.  She has been afebrile and also on  room air.  In talking with her she does feel like she is doing okay.  She denies any pain.  Her appetite has been good.  Denies any bowel or bladder problems.  Had a chance to go over her hospitalization as well as her records and laboratory studies.    Past Medical History:  Past Medical History:   Diagnosis Date     Dementia (H)      Hypotension      Neuropathy            Surgical History:  No past surgical history on file.    Family History:   No family history on file.    Social History:    Social History     Socioeconomic History     Marital status:      Spouse name: Not on file     Number of children: Not on file     Years of education: Not on file     Highest education level: Not on file   Occupational History     Not on file   Social Needs     Financial resource strain: Not on file     Food insecurity:     Worry: Not on file     Inability: Not on file     Transportation needs:     Medical: Not on file     Non-medical: Not on file   Tobacco Use     Smoking status: Never Smoker     Smokeless tobacco: Never Used   Substance and Sexual Activity     Alcohol use: Not Currently     Drug use: Never     Sexual activity: Not Currently   Lifestyle     Physical activity:     Days per week: Not on file     Minutes per session: Not on file     Stress: Not on file   Relationships     Social connections:     Talks on phone: Not on file     Gets together: Not on file     Attends Rastafarian service: Not on file     Active member of club or organization: Not on file     Attends meetings of clubs or organizations: Not on file     Relationship status: Not on file     Intimate partner violence:     Fear of current or ex partner: Not on file     Emotionally abused: Not on file     Physically abused: Not on file     Forced sexual activity: Not on file   Other Topics Concern     Not on file   Social History Narrative     Not on file          Review of Systems  Currently she denies chills and fever coughing wheezing chest pain  dizziness or vertigo nausea vomiting diarrhea dysuria flulike symptoms headache or stiff neck.  History of cognitive impairment hyperlipidemia and syncopal episodes and most recently bilateral ankle fracture secondary to a fall.  Neuropathic pain.  There were no vitals filed for this visit.  Blood pressure 132/83 pulse 87 respiration 16 saturation room air 93%  Physical Exam  Head is normocephalic.  Conjunctiva is pink sclerae clear.  Neck is supple without adenopathy.  Lungs are clear throughout.  Cardiovascular is normal without murmurs.  No lower extremity edema.  Gastrointestinal slightly protuberant nontender nondistended.  Musculoskeletal moves upper extremities.  Bilateral lower extremities in Cam boots.  Positive CMS to her feet as well as popliteal pulses.  Psychiatric Pleasant affect.  Does have cognitive impairment.  Medication List:  Current Outpatient Medications   Medication Sig     acetaminophen (TYLENOL) 500 MG tablet Take 1 tablet (500 mg total) by mouth every 6 (six) hours as needed for pain or fever.     aspirin 81 mg chewable tablet Chew 1 tablet (81 mg total) 2 (two) times a day.     bisacodyl (DULCOLAX) 10 mg suppository Insert suppository rectally daily as needed for treatment of constipation.     donepezil (ARICEPT) 10 MG tablet Take 10 mg by mouth at bedtime.     gabapentin (NEURONTIN) 600 MG tablet Take 600 mg by mouth 3 (three) times a day.     magnesium hydroxide (MILK OF MAG) 400 mg/5 mL Susp suspension Take 30 mL by mouth daily as needed.     midodrine (PROAMATINE) 10 MG tablet Take 10 mg by mouth 3 (three) times a day with meals.     polyethylene glycol (MIRALAX) 17 gram packet Take 1 packet (17 g total) by mouth daily as needed.     polyvinyl alcohol (LIQUIFILM TEARS) 1.4 % ophthalmic solution Apply 1 drop to eye as needed for dry eyes.     rosuvastatin (CRESTOR) 10 MG tablet Take 10 mg by mouth daily.        Labs:  Lab Results   Component Value Date    WBC 7.5 12/30/2019    HGB 11.3  (L) 12/30/2019    HCT 39.1 12/29/2019    MCV 93 12/29/2019     12/29/2019     Results for orders placed or performed during the hospital encounter of 12/29/19   Basic metabolic panel   Result Value Ref Range    Sodium 140 136 - 145 mmol/L    Potassium 4.2 3.5 - 5.0 mmol/L    Chloride 107 98 - 107 mmol/L    CO2 25 22 - 31 mmol/L    Anion Gap, Calculation 8 5 - 18 mmol/L    Glucose 96 70 - 125 mg/dL    Calcium 9.1 8.5 - 10.5 mg/dL    BUN 19 8 - 28 mg/dL    Creatinine 0.87 0.60 - 1.10 mg/dL    GFR MDRD Af Amer >60 >60 mL/min/1.73m2    GFR MDRD Non Af Amer >60 >60 mL/min/1.73m2     Lab Results   Component Value Date    ALT 18 12/29/2019    AST 25 12/29/2019    ALKPHOS 78 12/29/2019    BILITOT 0.4 12/29/2019     Lab Results   Component Value Date    ALBUMIN 3.9 12/29/2019         Assessment:    ICD-10-CM    1. Closed fracture of both ankles with routine healing, subsequent encounter S82.891D     S82.892D    2. Neuropathy G62.9    3. Vasovagal syncope R55    4. Cognitive impairment R41.89        Plan:  At this point a minute have staff wash and dry her legs and put lotion on them twice a day because she is in the cam boots.  We will also try to find out when her follow-up with orthopedics has been scheduled.  Continue to monitor the blood pressures as well as her pain status.  She is not having any pain right now and she can have Tylenol as needed but no other narcotics have been ordered.  No laboratory studies are necessary.  Her labs in the hospital unremarkable.    For documentation purposes total visit 40 minutes which over 50% was spent with the patient going over her care her hospitalization her fractures vital signs medications as well as the stay on the transitional care unit and coordination of care efforts.      Electronically signed by: Michael Duane Johnson, CNP

## 2021-06-04 NOTE — PROGRESS NOTES
[facility]      Facility:    [facility] [[id number]]  Code Status: FULL CODE      Chief Complaint/Reason for Visit:  Chief Complaint   Patient presents with     H & P       HPI:   Kat is a 84 y.o. female who who I had the pleasure of visiting with secondary to admission into the assisted living facility third floor room 336.  She is residing with her .  They have recently moved into the facility.  They have had quite a good life.  They lived in all parts of the [place] they recently lived in [place] lived in [place] as well as spent some time in [place].  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  She does walk with a cane.  Has a history of chronic low back pain along with chronic right lower extremity pain along with neuropathy.  Seems to be pretty well controlled with the gabapentin.  Has a history of hypotension currently on Midrin.  Is not checking her blood pressures regularly and does not receive any other particular services in the care home so perhaps it would benefit her to get an automatic cuff to monitor her blood pressures.  Is being treated for dementia with Aricept and Namenda.  She is also on Crestor secondary to hyperlipidemia.  They state that she did have a cholesterol check about 2 months ago.  Once again there are no current records.  I did a chance to talk to staff including charge nurse and asked them to obtain those records.  Once again regarding her back she has had steroid injections in the past.  Also would like those records.  Right now does not need any injections but in the meantime we may want to check to see if who is in network so that if she needs an injection she is able to get in and obtain another injection.  Otherwise she states she has been in normal health.  Appetite good.  No colds or flus or other problems.    Past Medical History:  Past Medical History:   Diagnosis Date     Dementia (H)      Hypotension       Neuropathy            Surgical History:  No past surgical history on file.    Family History:   No family history on file.    Social History:    Social History     Socioeconomic History     Marital status:      Spouse name: Not on file     Number of children: Not on file     Years of education: Not on file     Highest education level: Not on file   Occupational History     Not on file   Social Needs     Financial resource strain: Not on file     Food insecurity:     Worry: Not on file     Inability: Not on file     Transportation needs:     Medical: Not on file     Non-medical: Not on file   Tobacco Use     Smoking status: Never Smoker     Smokeless tobacco: Never Used   Substance and Sexual Activity     Alcohol use: Not on file     Drug use: Not on file     Sexual activity: Not on file   Lifestyle     Physical activity:     Days per week: Not on file     Minutes per session: Not on file     Stress: Not on file   Relationships     Social connections:     Talks on phone: Not on file     Gets together: Not on file     Attends Zoroastrianism service: Not on file     Active member of club or organization: Not on file     Attends meetings of clubs or organizations: Not on file     Relationship status: Not on file     Intimate partner violence:     Fear of current or ex partner: Not on file     Emotionally abused: Not on file     Physically abused: Not on file     Forced sexual activity: Not on file   Other Topics Concern     Not on file   Social History Narrative     Not on file          Review of Systems  Currently she denies chills and fever coughing and wheezing chest pain dizziness or vertigo nausea vomiting diarrhea flulike symptoms unusual myalgias arthralgias rashes or headaches.  There were no vitals filed for this visit.  Heart rate 78 respirations 20.  Physical Exam  Head is normocephalic.  Conjunctiva is pink sclerae clear.  Neck is supple without adenopathy.  Lung sounds are clear throughout.   Cardiovascular is normal without murmurs.  Gastrointestinal protuberant nontender positive bowel sounds.  Musculoskeletal able to ambulate with her cane.  Does have chronic low back pain as well as chronic right leg pain as well as right leg weakness.  Neuropathy.  No obvious tendon points to her major joints.  Psychiatric: Pleasant affect.  Medication List:  Current Outpatient Medications   Medication Sig     donepezil (ARICEPT) 10 MG tablet Take 10 mg by mouth at bedtime.     gabapentin (NEURONTIN) 600 MG tablet Take 600 mg by mouth 3 (three) times a day.     memantine (NAMENDA XR) 28 mg CSpX Take 28 mg by mouth daily.     midodrine (PROAMATINE) 10 MG tablet Take 10 mg by mouth 3 (three) times a day with meals.     rosuvastatin (CRESTOR) 10 MG tablet Take 10 mg by mouth at bedtime.       Labs:  No results found for: WBC, HGB, HCT, MCV, PLT      Assessment:    ICD-10-CM    1. Chronic bilateral low back pain without sciatica M54.5     G89.29    2. Chronic pain of right lower extremity M79.604     G89.29    3. Hypotension, unspecified hypotension type I95.9    4. Cognitive impairment R41.89    5. Neuropathy G62.9    6. Hyperlipidemia, unspecified hyperlipidemia type E78.5        Plan:  At this time I am asking them as well as the charge nurse and staff to obtain other old medical records so that we can go through them and make sure not missing anything.  In the meantime they certainly can call and try to get the records as well.  Perhaps get a automatic blood pressure cuff to see what her blood pressures are doing since that she is on Midodrin.  Also her cholesterol apparently was checked about 2 months ago.  They do have family in the area.  They are in the assisted living facility but they will also be managing her own medications.  She did not have any other questions.    For documentation purposes total visit 45 minutes which over 50% was spent with the patient going over her history medications help her  emotional activities discussion of services in the assisted living facility and coordination of care.      Electronically signed by: Michael Duane Johnson, CNP

## 2021-06-05 VITALS
SYSTOLIC BLOOD PRESSURE: 98 MMHG | HEART RATE: 88 BPM | BODY MASS INDEX: 30.96 KG/M2 | DIASTOLIC BLOOD PRESSURE: 62 MMHG | TEMPERATURE: 99.1 F | WEIGHT: 169.25 LBS

## 2021-06-05 NOTE — PROGRESS NOTES
[facility]    Facility:   [facility] [[id number]]   Code Status: DNR      CHIEF COMPLAINT/REASON FOR VISIT:  Chief Complaint   Patient presents with     Follow Up     rehab, ankles       HISTORY:      HPI: Kat is a 84 y.o. female who had the opportunity to revisit with secondary to her hospitalization December 29 through January 1, 2020 secondary syncope and bilateral acute ankle fractures.  She did see orthopedics on January 29 she is doing well no pain decreased swelling continue nonweightbearing lower extremities continue to use ice may keep the boot or cam boot for range of motion exercises and follow-up again in about 4 weeks.  Therefore therapy is pretty limited at this time.  She has been normotensive and afebrile and also on room air.  Is on gabapentin for neuropathy and no pain issues.  Appetite is good.  She does have cognitive impairment. did need a chance to discuss her overall care her medications.  Denies any bowel or bladder issues.  She has been overall doing well did not have any particular complaints.    Past Medical History:   Diagnosis Date     Dementia (H)      Hypotension      Neuropathy              No family history on file.  Social History     Socioeconomic History     Marital status:      Spouse name: Not on file     Number of children: Not on file     Years of education: Not on file     Highest education level: Not on file   Occupational History     Not on file   Social Needs     Financial resource strain: Not on file     Food insecurity:     Worry: Not on file     Inability: Not on file     Transportation needs:     Medical: Not on file     Non-medical: Not on file   Tobacco Use     Smoking status: Never Smoker     Smokeless tobacco: Never Used   Substance and Sexual Activity     Alcohol use: Not Currently     Drug use: Never     Sexual activity: Not Currently   Lifestyle     Physical activity:     Days per week: Not on file      Minutes per session: Not on file     Stress: Not on file   Relationships     Social connections:     Talks on phone: Not on file     Gets together: Not on file     Attends Zoroastrian service: Not on file     Active member of club or organization: Not on file     Attends meetings of clubs or organizations: Not on file     Relationship status: Not on file     Intimate partner violence:     Fear of current or ex partner: Not on file     Emotionally abused: Not on file     Physically abused: Not on file     Forced sexual activity: Not on file   Other Topics Concern     Not on file   Social History Narrative     Not on file         Review of Systems  Currently she denies chills and fever coughing wheezing chest pain dizziness or vertigo nausea vomiting diarrhea dysuria flulike symptoms headache or stiff neck.  History of cognitive impairment hyperlipidemia and syncopal episodes and most recently bilateral ankle fracture secondary to a fall.  Neuropathic pain.    Current Outpatient Medications   Medication Sig     acetaminophen (TYLENOL) 500 MG tablet Take 1 tablet (500 mg total) by mouth every 6 (six) hours as needed for pain or fever.     aspirin 81 mg chewable tablet Chew 1 tablet (81 mg total) 2 (two) times a day.     bisacodyl (DULCOLAX) 10 mg suppository Insert suppository rectally daily as needed for treatment of constipation.     donepezil (ARICEPT) 10 MG tablet Take 10 mg by mouth at bedtime.     gabapentin (NEURONTIN) 600 MG tablet Take 700 mg by mouth 2 (two) times a day.      magnesium hydroxide (MILK OF MAG) 400 mg/5 mL Susp suspension Take 30 mL by mouth daily as needed.     polyethylene glycol (MIRALAX) 17 gram packet Take 1 packet (17 g total) by mouth daily as needed.     polyvinyl alcohol (LIQUIFILM TEARS) 1.4 % ophthalmic solution Apply 1 drop to eye as needed for dry eyes.     rosuvastatin (CRESTOR) 10 MG tablet Take 10 mg by mouth daily.        There were no vitals filed for this visit.  Blood pressure  102/61 pulse 64 respirations 16 temperature 98.1  Physical Exam  Head is normocephalic.     Lungs are clear throughout.  Cardiovascular is normal without murmurs.  No lower extremity edema.  Gastrointestinal nondistended.  Musculoskeletal moves upper extremities.  Bilateral lower extremities in Cam boots.  Positive CMS to her feet as well as popliteal pulses.  Psychiatric Pleasant affect.  Does have cognitive impairment.              LABS:   Lab Results   Component Value Date    WBC 7.5 12/30/2019    HGB 11.3 (L) 12/30/2019    HCT 39.1 12/29/2019    MCV 93 12/29/2019     12/29/2019         ASSESSMENT:      ICD-10-CM    1. Closed fracture of both ankles with routine healing, subsequent encounter S82.891D     S82.892D    2. Cognitive impairment R41.89    3. Hypotension, unspecified hypotension type I95.9    4. Neuropathy G62.9        PLAN:    No other new changes.  Once again did see orthopedics on the 29th still nonweightbearing and follow-up in about 4 weeks.  Once again just discussed her overall care the rehabilitation process her vital signs.    Electronically signed by: Michael Duane Johnson, CNP

## 2021-06-05 NOTE — PROGRESS NOTES
[facility]    Facility:   [facility] [[id number]]   Code Status: DNR      CHIEF COMPLAINT/REASON FOR VISIT:  Chief Complaint   Patient presents with     Follow Up     rehab, ankles.       HISTORY:      HPI: Kat is a 84 y.o. female who is still in the transitional care unit secondary to her hospitalization December 29, 2019 through January 1, 2020 secondary to syncope falls and acute bilateral ankle fractures.  For her neuropathic pain she is on gabapentin 700 mg twice daily.  There is no reports of any pain and only one Tylenol on January 7 was given as needed.  No bowel or bladder problems.  She also has hypotension recently made some adjustments to her Midrin now at 5 mg twice daily most of the systolics are ranging 110-120 and has been asymptomatic.  Regarding her ankles good CMS she does have a cam boots on was supposed to see orthopedics on 13th has not been changed to the 15th.  Appetite good.  Did not have any other questions.  She does have cognitive impairment.    Past Medical History:   Diagnosis Date     Dementia (H)      Hypotension      Neuropathy              No family history on file.  Social History     Socioeconomic History     Marital status:      Spouse name: Not on file     Number of children: Not on file     Years of education: Not on file     Highest education level: Not on file   Occupational History     Not on file   Social Needs     Financial resource strain: Not on file     Food insecurity:     Worry: Not on file     Inability: Not on file     Transportation needs:     Medical: Not on file     Non-medical: Not on file   Tobacco Use     Smoking status: Never Smoker     Smokeless tobacco: Never Used   Substance and Sexual Activity     Alcohol use: Not Currently     Drug use: Never     Sexual activity: Not Currently   Lifestyle     Physical activity:     Days per week: Not on file     Minutes per session: Not on file     Stress: Not on  file   Relationships     Social connections:     Talks on phone: Not on file     Gets together: Not on file     Attends Church service: Not on file     Active member of club or organization: Not on file     Attends meetings of clubs or organizations: Not on file     Relationship status: Not on file     Intimate partner violence:     Fear of current or ex partner: Not on file     Emotionally abused: Not on file     Physically abused: Not on file     Forced sexual activity: Not on file   Other Topics Concern     Not on file   Social History Narrative     Not on file         Review of Systems  Currently she denies chills and fever coughing wheezing chest pain dizziness or vertigo nausea vomiting diarrhea dysuria flulike symptoms headache or stiff neck.  History of cognitive impairment hyperlipidemia and syncopal episodes and most recently bilateral ankle fracture secondary to a fall.  Neuropathic pain.  Current Outpatient Medications   Medication Sig     acetaminophen (TYLENOL) 500 MG tablet Take 1 tablet (500 mg total) by mouth every 6 (six) hours as needed for pain or fever.     aspirin 81 mg chewable tablet Chew 1 tablet (81 mg total) 2 (two) times a day.     bisacodyl (DULCOLAX) 10 mg suppository Insert suppository rectally daily as needed for treatment of constipation.     donepezil (ARICEPT) 10 MG tablet Take 10 mg by mouth at bedtime.     gabapentin (NEURONTIN) 600 MG tablet Take 700 mg by mouth 2 (two) times a day.      magnesium hydroxide (MILK OF MAG) 400 mg/5 mL Susp suspension Take 30 mL by mouth daily as needed.     midodrine (PROAMATINE) 10 MG tablet Take 5 mg by mouth 3 (three) times a day with meals.      polyethylene glycol (MIRALAX) 17 gram packet Take 1 packet (17 g total) by mouth daily as needed.     polyvinyl alcohol (LIQUIFILM TEARS) 1.4 % ophthalmic solution Apply 1 drop to eye as needed for dry eyes.     rosuvastatin (CRESTOR) 10 MG tablet Take 10 mg by mouth daily.        There were no  vitals filed for this visit.  Blood pressure 112/72 pulse 70 respirations 18 temperature 98.3  Physical Exam  Head is normocephalic.    Neck is supple without adenopathy.  Lungs are clear throughout.  Cardiovascular is normal without murmurs.  No lower extremity edema.  Gastrointestinal nondistended.  Musculoskeletal moves upper extremities.  Bilateral lower extremities in Cam boots.  Positive CMS to her feet as well as popliteal pulses.  Psychiatric Pleasant affect.  Does have cognitive impairment.       LABS:   Lab Results   Component Value Date    WBC 7.5 12/30/2019    HGB 11.3 (L) 12/30/2019    HCT 39.1 12/29/2019    MCV 93 12/29/2019     12/29/2019         ASSESSMENT:      ICD-10-CM    1. Hypotension, unspecified hypotension type I95.9    2. Neuropathy G62.9    3. Closed fracture of both ankles with routine healing, subsequent encounter S82.891D     S82.892D    4. Cognitive impairment R41.89        PLAN:    No further changes at this time.  Does see orthopedics on January 15.  Continue to manage and follow.        Electronically signed by: Michael Duane Johnson, CNP

## 2021-06-05 NOTE — PROGRESS NOTES
[facility]    Facility:   [facility] [[id number]]   Code Status: DNR      CHIEF COMPLAINT/REASON FOR VISIT:  Chief Complaint   Patient presents with     Follow Up     rehab, ankles.       HISTORY:      HPI: Kat is a 84 y.o. female was still in transitional care and secondary to her hospitalization December 29 through January 1, 2020 secondary to syncope and bilateral ankle fractures.  At this time very minimal rehabilitation due to nonweightbearing status.  Her next follow-up with orthopedics is February 24.  They can do some passive range of motion.  Not having any blood pressure issues has a history of hypotension.  Denies any pain is on gabapentin 700 mg twice a day for neuropathy.  Does have cognitive impairment is on Aricept.  Appetite is good.  Denies any colds or flus or other problems.    Past Medical History:   Diagnosis Date     Dementia (H)      Hypotension      Neuropathy              No family history on file.  Social History     Socioeconomic History     Marital status:      Spouse name: Not on file     Number of children: Not on file     Years of education: Not on file     Highest education level: Not on file   Occupational History     Not on file   Social Needs     Financial resource strain: Not on file     Food insecurity:     Worry: Not on file     Inability: Not on file     Transportation needs:     Medical: Not on file     Non-medical: Not on file   Tobacco Use     Smoking status: Never Smoker     Smokeless tobacco: Never Used   Substance and Sexual Activity     Alcohol use: Not Currently     Drug use: Never     Sexual activity: Not Currently   Lifestyle     Physical activity:     Days per week: Not on file     Minutes per session: Not on file     Stress: Not on file   Relationships     Social connections:     Talks on phone: Not on file     Gets together: Not on file     Attends Restorationist service: Not on file     Active member of club or  organization: Not on file     Attends meetings of clubs or organizations: Not on file     Relationship status: Not on file     Intimate partner violence:     Fear of current or ex partner: Not on file     Emotionally abused: Not on file     Physically abused: Not on file     Forced sexual activity: Not on file   Other Topics Concern     Not on file   Social History Narrative     Not on file         Review of Systems  Currently she denies chills and fever coughing wheezing chest pain dizziness or vertigo nausea vomiting diarrhea dysuria flulike symptoms headache or stiff neck.  History of cognitive impairment hyperlipidemia and syncopal episodes and most recently bilateral ankle fracture secondary to a fall.  Neuropathic pain.         Current Outpatient Medications:      acetaminophen (TYLENOL) 500 MG tablet, Take 1 tablet (500 mg total) by mouth every 6 (six) hours as needed for pain or fever., Disp: , Rfl: 0     aspirin 81 mg chewable tablet, Chew 1 tablet (81 mg total) 2 (two) times a day., Disp: , Rfl: 0     bisacodyl (DULCOLAX) 10 mg suppository, Insert suppository rectally daily as needed for treatment of constipation., Disp: , Rfl: 0     donepezil (ARICEPT) 10 MG tablet, Take 10 mg by mouth at bedtime., Disp: , Rfl:      gabapentin (NEURONTIN) 600 MG tablet, Take 700 mg by mouth 2 (two) times a day. , Disp: , Rfl:      magnesium hydroxide (MILK OF MAG) 400 mg/5 mL Susp suspension, Take 30 mL by mouth daily as needed., Disp: , Rfl: 0     polyethylene glycol (MIRALAX) 17 gram packet, Take 1 packet (17 g total) by mouth daily as needed., Disp: , Rfl: 0     polyvinyl alcohol (LIQUIFILM TEARS) 1.4 % ophthalmic solution, Apply 1 drop to eye as needed for dry eyes., Disp: , Rfl:      rosuvastatin (CRESTOR) 10 MG tablet, Take 10 mg by mouth daily. , Disp: , Rfl:     There were no vitals filed for this visit.  Blood pressure 102/68 pulse 72 respirations 18 saturation room air 95%  Physical Exam  Head is normocephalic.      Lungs are clear throughout.  Cardiovascular is normal without murmurs.  No lower extremity edema.  Gastrointestinal nondistended.  Musculoskeletal moves upper extremities.  Bilateral lower extremities in Cam boots.  Positive CMS to her feet as well as popliteal pulses.  Psychiatric Pleasant affect.  Does have cognitive impairment.           LABS:   Lab Results   Component Value Date    WBC 7.5 12/30/2019    HGB 11.3 (L) 12/30/2019    HCT 39.1 12/29/2019    MCV 93 12/29/2019     12/29/2019         ASSESSMENT:      ICD-10-CM    1. Closed fracture of both ankles with routine healing, subsequent encounter S82.891D     S82.892D    2. Cognitive impairment R41.89    3. Neuropathy G62.9    4. Hypotension, unspecified hypotension type I95.9        PLAN:    No other new changes with this particular visit.  She seems very comfortable.  Good CMS to the lower extremities.  No edema or other problems.  Continue to manage and follow.      Electronically signed by: Michael Duane Johnson, CNP

## 2021-06-05 NOTE — PROGRESS NOTES
[facility]    Facility:   [facility] [[id number]]   Code Status: DNR      CHIEF COMPLAINT/REASON FOR VISIT:  Chief Complaint   Patient presents with     Follow Up     rehab, ankles.        HISTORY:      HPI: Kat is a 84 y.o. female who I had the opportunity and pleasure to revisit with secondary to her hospitalization December 29, 2019 through January 1, 2020 secondary syncope falls and acute bilateral ankle fractures.  She does have a cam boots on.  Good CMS to her feet bilaterally.  Earlier this week did decrease the Midrin 5 mg 3 times a day rather than 10 mg 3 times a day due to low blood pressures the blood pressures did improve the used to be in the 90 range systolically and now they are at least 100-120 systolically.  Has been asymptomatic although does have cognitive impairment but overall has been able to participate in therapy and there are no reports of any hypotension or dizziness.  Also changed up her gabapentin to lowered it she used to be on 600 mg 3 times daily now she is on 700 mg twice daily.  She does have a regular diet.  No bowel or bladder problems.  Only taken one Tylenol since she has been here for pain.  Still working with therapy.  She is supposed to see orthopedics on the 13th.  According to the therapy group they want to keep her here least another 2 weeks because she still moderate assist with slide board transfer.    Past Medical History:   Diagnosis Date     Dementia (H)      Hypotension      Neuropathy              No family history on file.  Social History     Socioeconomic History     Marital status:      Spouse name: Not on file     Number of children: Not on file     Years of education: Not on file     Highest education level: Not on file   Occupational History     Not on file   Social Needs     Financial resource strain: Not on file     Food insecurity:     Worry: Not on file     Inability: Not on file     Transportation  needs:     Medical: Not on file     Non-medical: Not on file   Tobacco Use     Smoking status: Never Smoker     Smokeless tobacco: Never Used   Substance and Sexual Activity     Alcohol use: Not Currently     Drug use: Never     Sexual activity: Not Currently   Lifestyle     Physical activity:     Days per week: Not on file     Minutes per session: Not on file     Stress: Not on file   Relationships     Social connections:     Talks on phone: Not on file     Gets together: Not on file     Attends Muslim service: Not on file     Active member of club or organization: Not on file     Attends meetings of clubs or organizations: Not on file     Relationship status: Not on file     Intimate partner violence:     Fear of current or ex partner: Not on file     Emotionally abused: Not on file     Physically abused: Not on file     Forced sexual activity: Not on file   Other Topics Concern     Not on file   Social History Narrative     Not on file         Review of Systems  Currently she denies chills and fever coughing wheezing chest pain dizziness or vertigo nausea vomiting diarrhea dysuria flulike symptoms headache or stiff neck.  History of cognitive impairment hyperlipidemia and syncopal episodes and most recently bilateral ankle fracture secondary to a fall.  Neuropathic pain.  Current Outpatient Medications   Medication Sig     acetaminophen (TYLENOL) 500 MG tablet Take 1 tablet (500 mg total) by mouth every 6 (six) hours as needed for pain or fever.     aspirin 81 mg chewable tablet Chew 1 tablet (81 mg total) 2 (two) times a day.     bisacodyl (DULCOLAX) 10 mg suppository Insert suppository rectally daily as needed for treatment of constipation.     donepezil (ARICEPT) 10 MG tablet Take 10 mg by mouth at bedtime.     gabapentin (NEURONTIN) 600 MG tablet Take 700 mg by mouth 2 (two) times a day.      magnesium hydroxide (MILK OF MAG) 400 mg/5 mL Susp suspension Take 30 mL by mouth daily as needed.     midodrine  (PROAMATINE) 10 MG tablet Take 5 mg by mouth 3 (three) times a day with meals.      polyethylene glycol (MIRALAX) 17 gram packet Take 1 packet (17 g total) by mouth daily as needed.     polyvinyl alcohol (LIQUIFILM TEARS) 1.4 % ophthalmic solution Apply 1 drop to eye as needed for dry eyes.     rosuvastatin (CRESTOR) 10 MG tablet Take 10 mg by mouth daily.        There were no vitals filed for this visit.  Blood pressure 123/77 pulse 87 respirations 16 temperature 98.8 saturation room air 97%  Physical Exam  Head is normocephalic.    Neck is supple without adenopathy.  Lungs are clear throughout.  Cardiovascular is normal without murmurs.  No lower extremity edema.  Gastrointestinal slightly protuberant nontender nondistended.  Musculoskeletal moves upper extremities.  Bilateral lower extremities in Cam boots.  Positive CMS to her feet as well as popliteal pulses.  Psychiatric Pleasant affect.  Does have cognitive impairment.       LABS:   Lab Results   Component Value Date    WBC 7.5 12/30/2019    HGB 11.3 (L) 12/30/2019    HCT 39.1 12/29/2019    MCV 93 12/29/2019     12/29/2019     Results for orders placed or performed during the hospital encounter of 12/29/19   Basic metabolic panel   Result Value Ref Range    Sodium 140 136 - 145 mmol/L    Potassium 4.2 3.5 - 5.0 mmol/L    Chloride 107 98 - 107 mmol/L    CO2 25 22 - 31 mmol/L    Anion Gap, Calculation 8 5 - 18 mmol/L    Glucose 96 70 - 125 mg/dL    Calcium 9.1 8.5 - 10.5 mg/dL    BUN 19 8 - 28 mg/dL    Creatinine 0.87 0.60 - 1.10 mg/dL    GFR MDRD Af Amer >60 >60 mL/min/1.73m2    GFR MDRD Non Af Amer >60 >60 mL/min/1.73m2           ASSESSMENT:      ICD-10-CM    1. Closed fracture of both ankles with routine healing, subsequent encounter S82.891D     S82.892D    2. Hypotension, unspecified hypotension type I95.9    3. Neuropathy G62.9    4. Cognitive impairment R41.89        PLAN:      Blood pressures have improved.  No pain issues.  No neuropathy.  Has  been participating in therapy.  Does see orthopedics on the 13th.  Electronically signed by: Michael Duane Johnson, CNP

## 2021-06-05 NOTE — PROGRESS NOTES
[facility]    Facility:   [facility] [[id number]]   Code Status: DNR      CHIEF COMPLAINT/REASON FOR VISIT:  Chief Complaint   Patient presents with     Follow Up     rehab, ankles.       HISTORY:      HPI: Kat is a 84 y.o. female was still in the transitional care unit secondary to her hospitalization December 29 through January 1, 2020 secondary syncope as well as bilateral acute ankle fractures.  Not having any discomfort.  Did see orthopedics yesterday I do not have any other notes I do not know what kind of program that she is supposed to be on at this time.  According to her therapy note she is decreased therapy to 1 time a day C needs slide board transfers as well as cues and working on a toileting program.  Otherwise she has been normotensive and afebrile and also on room air.  No complaints of appetite changes belly problems flulike symptoms.  No neuropathy is on gabapentin.  Pleasant visit once again.    Past Medical History:   Diagnosis Date     Dementia (H)      Hypotension      Neuropathy              No family history on file.  Social History     Socioeconomic History     Marital status:      Spouse name: Not on file     Number of children: Not on file     Years of education: Not on file     Highest education level: Not on file   Occupational History     Not on file   Social Needs     Financial resource strain: Not on file     Food insecurity:     Worry: Not on file     Inability: Not on file     Transportation needs:     Medical: Not on file     Non-medical: Not on file   Tobacco Use     Smoking status: Never Smoker     Smokeless tobacco: Never Used   Substance and Sexual Activity     Alcohol use: Not Currently     Drug use: Never     Sexual activity: Not Currently   Lifestyle     Physical activity:     Days per week: Not on file     Minutes per session: Not on file     Stress: Not on file   Relationships     Social connections:     Talks on  phone: Not on file     Gets together: Not on file     Attends Catholic service: Not on file     Active member of club or organization: Not on file     Attends meetings of clubs or organizations: Not on file     Relationship status: Not on file     Intimate partner violence:     Fear of current or ex partner: Not on file     Emotionally abused: Not on file     Physically abused: Not on file     Forced sexual activity: Not on file   Other Topics Concern     Not on file   Social History Narrative     Not on file         Review of Systems  Currently she denies chills and fever coughing wheezing chest pain dizziness or vertigo nausea vomiting diarrhea dysuria flulike symptoms headache or stiff neck.  History of cognitive impairment hyperlipidemia and syncopal episodes and most recently bilateral ankle fracture secondary to a fall.  Neuropathic pain.  Current Outpatient Medications   Medication Sig     acetaminophen (TYLENOL) 500 MG tablet Take 1 tablet (500 mg total) by mouth every 6 (six) hours as needed for pain or fever.     aspirin 81 mg chewable tablet Chew 1 tablet (81 mg total) 2 (two) times a day.     bisacodyl (DULCOLAX) 10 mg suppository Insert suppository rectally daily as needed for treatment of constipation.     donepezil (ARICEPT) 10 MG tablet Take 10 mg by mouth at bedtime.     gabapentin (NEURONTIN) 600 MG tablet Take 700 mg by mouth 2 (two) times a day.      magnesium hydroxide (MILK OF MAG) 400 mg/5 mL Susp suspension Take 30 mL by mouth daily as needed.     polyethylene glycol (MIRALAX) 17 gram packet Take 1 packet (17 g total) by mouth daily as needed.     polyvinyl alcohol (LIQUIFILM TEARS) 1.4 % ophthalmic solution Apply 1 drop to eye as needed for dry eyes.     rosuvastatin (CRESTOR) 10 MG tablet Take 10 mg by mouth daily.        There were no vitals filed for this visit.  Blood pressure 113/70 pulse 64 respirations 16 temperature 97.7 saturation room air 96%  Physical Exam  Head is normocephalic.      Lungs are clear throughout.  Cardiovascular is normal without murmurs.  No lower extremity edema.  Gastrointestinal nondistended.  Musculoskeletal moves upper extremities.  Bilateral lower extremities in Cam boots.  Positive CMS to her feet as well as popliteal pulses.  Psychiatric Pleasant affect.  Does have cognitive impairment.            LABS:   Lab Results   Component Value Date    WBC 7.5 12/30/2019    HGB 11.3 (L) 12/30/2019    HCT 39.1 12/29/2019    MCV 93 12/29/2019     12/29/2019         ASSESSMENT:      ICD-10-CM    1. Neuropathy G62.9    2. Closed fracture of both ankles with routine healing, subsequent encounter S82.891D     S82.892D    3. Hypotension, unspecified hypotension type I95.9    4. Cognitive impairment R41.89        PLAN:    Exam wise she is doing fine.  She does have some cognitive impairment otherwise very pleasant visit today.  Continue to monitor her blood pressures and overall status as well as with therapy is trying to perform for her.  I am not sure at this point she is truly transitional care and appropriate perhaps going to long-term care until she is able to perform more therapy recommendations.      Electronically signed by: Michael Duane Johnson, CNP

## 2021-06-05 NOTE — PROGRESS NOTES
[facility]    Facility:   [facility] [[id number]]   Code Status: DNR      CHIEF COMPLAINT/REASON FOR VISIT:  Chief Complaint   Patient presents with     Follow Up     rehab, ankles.       HISTORY:      HPI: Kat is a 84 y.o. female who I had the pleasure revisiting with secondary to her hospitalization December 29 through January 1, 2020 secondary syncope and acute bilateral ankle fractures.  Her ankle seem to be doing quite well.  No pain issues no Tylenol as needed does have a chance see orthopedics tomorrow the 29th of her last visit was on the 13th.  Her neuropathy is well managed using gabapentin 700 mg twice daily.  She has been normotensive and afebrile.  No hypotensive issues.  Recently did discontinue the Midrin.  Most of the systolics have been less than 115.  For her cognition she is on Aricept.  Appetite good.  According to therapy they are limiting due to her ankles as well as nonweightbearing status he is minimal assist with slide board transfers.  Expect there is at upcoming care conference perhaps even going to long-term care prior to any potential discharge.    Past Medical History:   Diagnosis Date     Dementia (H)      Hypotension      Neuropathy              No family history on file.  Social History     Socioeconomic History     Marital status:      Spouse name: Not on file     Number of children: Not on file     Years of education: Not on file     Highest education level: Not on file   Occupational History     Not on file   Social Needs     Financial resource strain: Not on file     Food insecurity:     Worry: Not on file     Inability: Not on file     Transportation needs:     Medical: Not on file     Non-medical: Not on file   Tobacco Use     Smoking status: Never Smoker     Smokeless tobacco: Never Used   Substance and Sexual Activity     Alcohol use: Not Currently     Drug use: Never     Sexual activity: Not Currently   Lifestyle      Physical activity:     Days per week: Not on file     Minutes per session: Not on file     Stress: Not on file   Relationships     Social connections:     Talks on phone: Not on file     Gets together: Not on file     Attends Faith service: Not on file     Active member of club or organization: Not on file     Attends meetings of clubs or organizations: Not on file     Relationship status: Not on file     Intimate partner violence:     Fear of current or ex partner: Not on file     Emotionally abused: Not on file     Physically abused: Not on file     Forced sexual activity: Not on file   Other Topics Concern     Not on file   Social History Narrative     Not on file         Review of Systems  Currently she denies chills and fever coughing wheezing chest pain dizziness or vertigo nausea vomiting diarrhea dysuria flulike symptoms headache or stiff neck.  History of cognitive impairment hyperlipidemia and syncopal episodes and most recently bilateral ankle fracture secondary to a fall.  Neuropathic pain.         Current Outpatient Medications:      acetaminophen (TYLENOL) 500 MG tablet, Take 1 tablet (500 mg total) by mouth every 6 (six) hours as needed for pain or fever., Disp: , Rfl: 0     aspirin 81 mg chewable tablet, Chew 1 tablet (81 mg total) 2 (two) times a day., Disp: , Rfl: 0     bisacodyl (DULCOLAX) 10 mg suppository, Insert suppository rectally daily as needed for treatment of constipation., Disp: , Rfl: 0     donepezil (ARICEPT) 10 MG tablet, Take 10 mg by mouth at bedtime., Disp: , Rfl:      gabapentin (NEURONTIN) 600 MG tablet, Take 700 mg by mouth 2 (two) times a day. , Disp: , Rfl:      magnesium hydroxide (MILK OF MAG) 400 mg/5 mL Susp suspension, Take 30 mL by mouth daily as needed., Disp: , Rfl: 0     polyethylene glycol (MIRALAX) 17 gram packet, Take 1 packet (17 g total) by mouth daily as needed., Disp: , Rfl: 0     polyvinyl alcohol (LIQUIFILM TEARS) 1.4 % ophthalmic solution, Apply 1 drop  to eye as needed for dry eyes., Disp: , Rfl:      rosuvastatin (CRESTOR) 10 MG tablet, Take 10 mg by mouth daily. , Disp: , Rfl:     There were no vitals filed for this visit.  Blood pressure 115/67 pulse 69 respirations 18 temperature 97.9  Physical Exam  Head is normocephalic.     Lungs are clear throughout.  Cardiovascular is normal without murmurs.  No lower extremity edema.  Gastrointestinal nondistended.  Musculoskeletal moves upper extremities.  Bilateral lower extremities in Cam boots.  Positive CMS to her feet as well as popliteal pulses.  Psychiatric Pleasant affect.  Does have cognitive impairment.          LABS:   Lab Results   Component Value Date    WBC 7.5 12/30/2019    HGB 11.3 (L) 12/30/2019    HCT 39.1 12/29/2019    MCV 93 12/29/2019     12/29/2019     Results for orders placed or performed during the hospital encounter of 12/29/19   Basic metabolic panel   Result Value Ref Range    Sodium 140 136 - 145 mmol/L    Potassium 4.2 3.5 - 5.0 mmol/L    Chloride 107 98 - 107 mmol/L    CO2 25 22 - 31 mmol/L    Anion Gap, Calculation 8 5 - 18 mmol/L    Glucose 96 70 - 125 mg/dL    Calcium 9.1 8.5 - 10.5 mg/dL    BUN 19 8 - 28 mg/dL    Creatinine 0.87 0.60 - 1.10 mg/dL    GFR MDRD Af Amer >60 >60 mL/min/1.73m2    GFR MDRD Non Af Amer >60 >60 mL/min/1.73m2           ASSESSMENT:      ICD-10-CM    1. Closed fracture of both ankles with routine healing, subsequent encounter S82.891D     S82.892D    2. Hypotension, unspecified hypotension type I95.9    3. Neuropathy G62.9    4. Cognitive impairment R41.89        PLAN:    Once again seems to be doing fine is stable overall.  Therapy is limited.  Needs a care conference.  Perhaps going to long-term care.  Does have a visit with orthopedics tomorrow for recheck.      Electronically signed by: Michael Duane Johnson, CNP

## 2021-06-05 NOTE — PROGRESS NOTES
[facility]    Facility:   [facility] [[id number]]   Code Status: DNR      CHIEF COMPLAINT/REASON FOR VISIT:  Chief Complaint   Patient presents with     Follow Up     rehab, ankles       HISTORY:      HPI: Kat is a 84 y.o. female who is in the transitional care unit secondary to her hospitalization December 29, 2019 through January 1, 2020 secondary to syncope with multiple falls along with acute bilateral ankle fractures.  She currently is in the transitional care unit does have cam boots on.  Next visit orthopedics is on January 13.  Nonweightbearing.  Her systolic blood pressures over the past week have been running anywhere from 101-113 systolically and she is on Midodrin 10 mg 3 times daily with a history of falls and knows if it is hypotension I will decrease the Midrin 5 mg 3 times a day and continue to monitor.  Also pharmacy wanted to decrease her gabapentin from 600 mg 3 times daily to 700 mg twice daily so I will also be done today.  She is on Aricept for cognition.  Appetite is good.  Her pain seems to be managed has not received any Tylenol as needed.  Bowels are regular.    Past Medical History:   Diagnosis Date     Dementia (H)      Hypotension      Neuropathy              No family history on file.  Social History     Socioeconomic History     Marital status:      Spouse name: Not on file     Number of children: Not on file     Years of education: Not on file     Highest education level: Not on file   Occupational History     Not on file   Social Needs     Financial resource strain: Not on file     Food insecurity:     Worry: Not on file     Inability: Not on file     Transportation needs:     Medical: Not on file     Non-medical: Not on file   Tobacco Use     Smoking status: Never Smoker     Smokeless tobacco: Never Used   Substance and Sexual Activity     Alcohol use: Not Currently     Drug use: Never     Sexual activity: Not Currently    Lifestyle     Physical activity:     Days per week: Not on file     Minutes per session: Not on file     Stress: Not on file   Relationships     Social connections:     Talks on phone: Not on file     Gets together: Not on file     Attends Hindu service: Not on file     Active member of club or organization: Not on file     Attends meetings of clubs or organizations: Not on file     Relationship status: Not on file     Intimate partner violence:     Fear of current or ex partner: Not on file     Emotionally abused: Not on file     Physically abused: Not on file     Forced sexual activity: Not on file   Other Topics Concern     Not on file   Social History Narrative     Not on file         Review of Systems  Currently she denies chills and fever coughing wheezing chest pain dizziness or vertigo nausea vomiting diarrhea dysuria flulike symptoms headache or stiff neck.  History of cognitive impairment hyperlipidemia and syncopal episodes and most recently bilateral ankle fracture secondary to a fall.  Neuropathic pain.    Current Outpatient Medications:      acetaminophen (TYLENOL) 500 MG tablet, Take 1 tablet (500 mg total) by mouth every 6 (six) hours as needed for pain or fever., Disp: , Rfl: 0     aspirin 81 mg chewable tablet, Chew 1 tablet (81 mg total) 2 (two) times a day., Disp: , Rfl: 0     bisacodyl (DULCOLAX) 10 mg suppository, Insert suppository rectally daily as needed for treatment of constipation., Disp: , Rfl: 0     donepezil (ARICEPT) 10 MG tablet, Take 10 mg by mouth at bedtime., Disp: , Rfl:      gabapentin (NEURONTIN) 600 MG tablet, Take 700 mg by mouth 2 (two) times a day. , Disp: , Rfl:      magnesium hydroxide (MILK OF MAG) 400 mg/5 mL Susp suspension, Take 30 mL by mouth daily as needed., Disp: , Rfl: 0     midodrine (PROAMATINE) 10 MG tablet, Take 5 mg by mouth 3 (three) times a day with meals. , Disp: , Rfl:      polyethylene glycol (MIRALAX) 17 gram packet, Take 1 packet (17 g total) by  mouth daily as needed., Disp: , Rfl: 0     polyvinyl alcohol (LIQUIFILM TEARS) 1.4 % ophthalmic solution, Apply 1 drop to eye as needed for dry eyes., Disp: , Rfl:      rosuvastatin (CRESTOR) 10 MG tablet, Take 10 mg by mouth daily. , Disp: , Rfl:     There were no vitals filed for this visit.  Blood pressure 113/76 pulse 82 respirations 18 saturation room air 95%  Physical Exam  Head is normocephalic.  Conjunctiva is pink sclerae clear.  Neck is supple without adenopathy.  Lungs are clear throughout.  Cardiovascular is normal without murmurs.  No lower extremity edema.  Gastrointestinal slightly protuberant nontender nondistended.  Musculoskeletal moves upper extremities.  Bilateral lower extremities in Cam boots.  Positive CMS to her feet as well as popliteal pulses.  Psychiatric Pleasant affect.  Does have cognitive impairment.     LABS:   Results for orders placed or performed during the hospital encounter of 12/29/19   Basic metabolic panel   Result Value Ref Range    Sodium 140 136 - 145 mmol/L    Potassium 4.2 3.5 - 5.0 mmol/L    Chloride 107 98 - 107 mmol/L    CO2 25 22 - 31 mmol/L    Anion Gap, Calculation 8 5 - 18 mmol/L    Glucose 96 70 - 125 mg/dL    Calcium 9.1 8.5 - 10.5 mg/dL    BUN 19 8 - 28 mg/dL    Creatinine 0.87 0.60 - 1.10 mg/dL    GFR MDRD Af Amer >60 >60 mL/min/1.73m2    GFR MDRD Non Af Amer >60 >60 mL/min/1.73m2       Lab Results   Component Value Date    WBC 7.5 12/30/2019    HGB 11.3 (L) 12/30/2019    HCT 39.1 12/29/2019    MCV 93 12/29/2019     12/29/2019     Lab Results   Component Value Date    ALT 18 12/29/2019    AST 25 12/29/2019    ALKPHOS 78 12/29/2019    BILITOT 0.4 12/29/2019     No results found for: CHOL  No results found for: HDL  No results found for: LDLCALC  No results found for: TRIG  No components found for: CHOLHDL      ASSESSMENT:      ICD-10-CM    1. Hypotension, unspecified hypotension type I95.9    2. Neuropathy G62.9    3. Closed fracture of both ankles with  routine healing, subsequent encounter S82.891D     S82.892D    4. Cognitive impairment R41.89        PLAN:    Decreasing Midrin 5 mg 3 times daily and decreasing Neurontin per pharmacy recommendation 700 mg twice daily rather than 600 mg 3 times daily.  Her pain is also managed.  Continue to monitor blood pressures.  Follow with orthopedics on January 13.        Electronically signed by: Michael Duane Johnson, CNP

## 2021-06-06 NOTE — TELEPHONE ENCOUNTER
Dr. Jo,  Please review message below and advise if the patient should be seen in clinic.  Thank you.  Magalis MAGAÑA, KIM/CMT....................12:53 PM

## 2021-06-06 NOTE — PROGRESS NOTES
[facility]    Facility:   [facility] (formerly [facility]) [[id number]]   Code Status: DNR      CHIEF COMPLAINT/REASON FOR VISIT:  Chief Complaint   Patient presents with     Follow Up     bilat ankle fx       HISTORY:      HPI: Kat is a 84 y.o. female who is still in the transitional care unit secondary to her hospitalization December 29, 2019 through January 1, 2020 secondary to bilateral ankle fractures.  She recently did have an opportunity to visit with the orthopedic group and they did allow some new orders to do some weightbearing as tolerated from her appointment on February 19 and has a follow-up in 2 weeks.  Also supposed to do some range of motion.  She currently has been normotensive and afebrile.  No hypotensive episodes.  Her appetite is been appropriate.  The neuropathy is also well contained with just Neurontin 600 mg twice daily.  Also rechecked her cholesterol see results below.    Past Medical History:   Diagnosis Date     Dementia (H)      Hypotension      Neuropathy              No family history on file.  Social History     Socioeconomic History     Marital status:      Spouse name: Not on file     Number of children: Not on file     Years of education: Not on file     Highest education level: Not on file   Occupational History     Not on file   Social Needs     Financial resource strain: Not on file     Food insecurity:     Worry: Not on file     Inability: Not on file     Transportation needs:     Medical: Not on file     Non-medical: Not on file   Tobacco Use     Smoking status: Never Smoker     Smokeless tobacco: Never Used   Substance and Sexual Activity     Alcohol use: Not Currently     Drug use: Never     Sexual activity: Not Currently   Lifestyle     Physical activity:     Days per week: Not on file     Minutes per session: Not on file     Stress: Not on file   Relationships     Social connections:     Talks on phone: Not on file     Gets together:  Not on file     Attends Muslim service: Not on file     Active member of club or organization: Not on file     Attends meetings of clubs or organizations: Not on file     Relationship status: Not on file     Intimate partner violence:     Fear of current or ex partner: Not on file     Emotionally abused: Not on file     Physically abused: Not on file     Forced sexual activity: Not on file   Other Topics Concern     Not on file   Social History Narrative     Not on file         Review of Systems  Currently she denies chills and fever coughing wheezing chest pain dizziness or vertigo nausea vomiting diarrhea dysuria flulike symptoms headache or stiff neck.  History of cognitive impairment hyperlipidemia and syncopal episodes and most recently bilateral ankle fracture secondary to a fall.  Neuropathic pain.        Current Outpatient Medications:      acetaminophen (TYLENOL) 500 MG tablet, Take 1 tablet (500 mg total) by mouth every 6 (six) hours as needed for pain or fever., Disp: , Rfl: 0     aspirin 81 mg chewable tablet, Chew 1 tablet (81 mg total) 2 (two) times a day., Disp: , Rfl: 0     bisacodyl (DULCOLAX) 10 mg suppository, Insert suppository rectally daily as needed for treatment of constipation., Disp: , Rfl: 0     donepezil (ARICEPT) 10 MG tablet, Take 10 mg by mouth at bedtime., Disp: , Rfl:      gabapentin (NEURONTIN) 600 MG tablet, Take 600 mg by mouth 2 (two) times a day. , Disp: , Rfl:      magnesium hydroxide (MILK OF MAG) 400 mg/5 mL Susp suspension, Take 30 mL by mouth daily as needed., Disp: , Rfl: 0     polyethylene glycol (MIRALAX) 17 gram packet, Take 1 packet (17 g total) by mouth daily as needed., Disp: , Rfl: 0     polyvinyl alcohol (LIQUIFILM TEARS) 1.4 % ophthalmic solution, Apply 1 drop to eye as needed for dry eyes., Disp: , Rfl:      rosuvastatin (CRESTOR) 10 MG tablet, Take 10 mg by mouth daily. , Disp: , Rfl:   There were no vitals filed for this visit.  Blood pressure 119/65 pulse  65 respirations 18 temperature 98.1 saturation room air 96%  Physical Exam  Head is normocephalic.     Lungs are clear throughout.  Cardiovascular is normal without murmurs.  No lower extremity edema.  Gastrointestinal nondistended.  Musculoskeletal moves upper extremities.  Bilateral lower extremities in Cam boots.  Positive CMS to her feet as well as popliteal pulses.  Psychiatric Pleasant affect.  Does have cognitive impairment.     LABS:   Lab Results   Component Value Date    CHOL 132 02/12/2020     Lab Results   Component Value Date    HDL 39 (L) 02/12/2020     Lab Results   Component Value Date    LDLCALC 56 02/12/2020     Lab Results   Component Value Date    TRIG 185 (H) 02/12/2020     No components found for: CHOLHDL      ASSESSMENT:      ICD-10-CM    1. Hypotension, unspecified hypotension type I95.9    2. Cognitive impairment R41.89    3. Closed fracture of both ankles with routine healing, subsequent encounter S82.891D     S82.892D    4. Chronic bilateral low back pain without sciatica M54.5     G89.29        PLAN:    No other new changes at this time.  Recently did see orthopedics and they did give her weightbearing as tolerated.  We will see what therapy says in the future.  There is a follow-up with orthopedics in about 2 weeks.      Electronically signed by: Michael Duane Johnson, CNP

## 2021-06-06 NOTE — TELEPHONE ENCOUNTER
I recommend she placed on Dr. Cornejo's calendar when she is back as a 40 minute telephone visit to follow up on her hospitalization and establish care.  Please send a message to Dr. Cornejo to review when she is able to make sure she agrees.  Thanks

## 2021-06-06 NOTE — TELEPHONE ENCOUNTER
New Appointment Needed  What is the reason for the visit:    Established care. Caller stated that her dad is a patient of Dr. Cornejo and she would like the patient to established care with Dr. Cornejo. Caller stated that the patient was discharged from TCU. Caller is questioning if this is a good time to come in. If so, she would like to bring the patient in. caller stated that the patient does not have any symptoms at this time and that the patient is well and not sick just need to establish care and place orders for home care services.  Provider Preference: Ayse Cornejo MD  How soon do you need to be seen?: n/a  Waitlist offered?: No  Okay to leave a detailed message:  Yes  [phone]

## 2021-06-06 NOTE — PROGRESS NOTES
[facility]    Facility:   [facility] [[id number]]   Code Status: DNR/DNI      CHIEF COMPLAINT/REASON FOR VISIT:  Chief Complaint   Patient presents with     Follow Up     rehab, ankles. htn       HISTORY:      HPI: Kat is a 84 y.o. female who I had the opportunity to revisit with secondary to her hospitalization December 29 through January 1, 2020 secondary syncope and did sustain bilateral ankle fractures.  Her ankle fractures are nonsurgical.  Does have a visit with orthopedics on February 24.  Does have cam boots.  Still nonweightbearing.  Recently decreased the Neurontin 600 mg twice daily rather than 700 mg twice daily no pain or neuropathy.  Not having any Tylenol as needed.  Recently did do the cholesterol check and see results below.  She does have cognitive impairment is on Aricept.  Her appetite is good.  Always a pleasure to visit with.  Does have a lot of noise cards on her wall as well as a new baseball of flowers.  She did not have anything really else to add to the conversation and she states that she is overall doing pretty well.    Past Medical History:   Diagnosis Date     Dementia (H)      Hypotension      Neuropathy              No family history on file.  Social History     Socioeconomic History     Marital status:      Spouse name: Not on file     Number of children: Not on file     Years of education: Not on file     Highest education level: Not on file   Occupational History     Not on file   Social Needs     Financial resource strain: Not on file     Food insecurity:     Worry: Not on file     Inability: Not on file     Transportation needs:     Medical: Not on file     Non-medical: Not on file   Tobacco Use     Smoking status: Never Smoker     Smokeless tobacco: Never Used   Substance and Sexual Activity     Alcohol use: Not Currently     Drug use: Never     Sexual activity: Not Currently   Lifestyle     Physical activity:     Days  per week: Not on file     Minutes per session: Not on file     Stress: Not on file   Relationships     Social connections:     Talks on phone: Not on file     Gets together: Not on file     Attends Uatsdin service: Not on file     Active member of club or organization: Not on file     Attends meetings of clubs or organizations: Not on file     Relationship status: Not on file     Intimate partner violence:     Fear of current or ex partner: Not on file     Emotionally abused: Not on file     Physically abused: Not on file     Forced sexual activity: Not on file   Other Topics Concern     Not on file   Social History Narrative     Not on file         Review of Systems  Currently she denies chills and fever coughing wheezing chest pain dizziness or vertigo nausea vomiting diarrhea dysuria flulike symptoms headache or stiff neck.  History of cognitive impairment hyperlipidemia and syncopal episodes and most recently bilateral ankle fracture secondary to a fall.  Neuropathic pain.      Current Outpatient Medications:      acetaminophen (TYLENOL) 500 MG tablet, Take 1 tablet (500 mg total) by mouth every 6 (six) hours as needed for pain or fever., Disp: , Rfl: 0     aspirin 81 mg chewable tablet, Chew 1 tablet (81 mg total) 2 (two) times a day., Disp: , Rfl: 0     bisacodyl (DULCOLAX) 10 mg suppository, Insert suppository rectally daily as needed for treatment of constipation., Disp: , Rfl: 0     donepezil (ARICEPT) 10 MG tablet, Take 10 mg by mouth at bedtime., Disp: , Rfl:      gabapentin (NEURONTIN) 600 MG tablet, Take 600 mg by mouth 2 (two) times a day. , Disp: , Rfl:      magnesium hydroxide (MILK OF MAG) 400 mg/5 mL Susp suspension, Take 30 mL by mouth daily as needed., Disp: , Rfl: 0     polyethylene glycol (MIRALAX) 17 gram packet, Take 1 packet (17 g total) by mouth daily as needed., Disp: , Rfl: 0     polyvinyl alcohol (LIQUIFILM TEARS) 1.4 % ophthalmic solution, Apply 1 drop to eye as needed for dry eyes.,  Disp: , Rfl:      rosuvastatin (CRESTOR) 10 MG tablet, Take 10 mg by mouth daily. , Disp: , Rfl:     There were no vitals filed for this visit.  Blood pressure 102/66 pulse 72 respirations 18 temperature 98.8  Physical Exam  Head is normocephalic.     Lungs are clear throughout.  Cardiovascular is normal without murmurs.  No lower extremity edema.  Gastrointestinal nondistended.  Musculoskeletal moves upper extremities.  Bilateral lower extremities in Cam boots.  Positive CMS to her feet as well as popliteal pulses.  Psychiatric Pleasant affect.  Does have cognitive impairment.         LABS:   Lab Results   Component Value Date    WBC 7.5 12/30/2019    HGB 11.3 (L) 12/30/2019    HCT 39.1 12/29/2019    MCV 93 12/29/2019     12/29/2019     Lab Results   Component Value Date    CHOL 132 02/12/2020     Lab Results   Component Value Date    HDL 39 (L) 02/12/2020     Lab Results   Component Value Date    LDLCALC 56 02/12/2020     Lab Results   Component Value Date    TRIG 185 (H) 02/12/2020     No components found for: CHOLHDL      ASSESSMENT:      ICD-10-CM    1. Hypotension, unspecified hypotension type I95.9    2. Cognitive impairment R41.89    3. Closed fracture of both ankles with routine healing, subsequent encounter S82.891D     S82.892D    4. Vasovagal syncope R55        PLAN:    No new changes.  Continue to manage and follow blood pressures look good she has good pain control.  No neuropathy.  Appetite good.  Does see orthopedics on the 24th.      Electronically signed by: Michael Duane Johnson, CNP

## 2021-06-06 NOTE — PROGRESS NOTES
[facility]    Facility:   [facility] [[id number]]   Code Status: DNR      CHIEF COMPLAINT/REASON FOR VISIT:  Chief Complaint   Patient presents with     Follow Up     rehab, ankles, co, htn       HISTORY:      HPI: Kat is a 85 y.o. female who I had the chance to revisit with secondary to her hospitalization December 29, 2019 through January 1, 2020 secondary to bilateral ankle fractures.  She has seen orthopedics and now have given her full range of ambulation as tolerated.  She has been doing quite well no falls or other problems.  Her pain is well in check she does have a history of neuropathy currently on gabapentin.  She has been normotensive and afebrile and also on room air.  Appetite is good.  Denies any bowel or bladder problems.  Looks like there is a potential discharge for next week and then after that discharge she will be actually going to her daughter's home.    Past Medical History:   Diagnosis Date     Dementia (H)      Hypotension      Neuropathy              No family history on file.  Social History     Socioeconomic History     Marital status:      Spouse name: Not on file     Number of children: Not on file     Years of education: Not on file     Highest education level: Not on file   Occupational History     Not on file   Social Needs     Financial resource strain: Not on file     Food insecurity     Worry: Not on file     Inability: Not on file     Transportation needs     Medical: Not on file     Non-medical: Not on file   Tobacco Use     Smoking status: Never Smoker     Smokeless tobacco: Never Used   Substance and Sexual Activity     Alcohol use: Not Currently     Drug use: Never     Sexual activity: Not Currently   Lifestyle     Physical activity     Days per week: Not on file     Minutes per session: Not on file     Stress: Not on file   Relationships     Social connections     Talks on phone: Not on file     Gets together: Not on  file     Attends Tenriism service: Not on file     Active member of club or organization: Not on file     Attends meetings of clubs or organizations: Not on file     Relationship status: Not on file     Intimate partner violence     Fear of current or ex partner: Not on file     Emotionally abused: Not on file     Physically abused: Not on file     Forced sexual activity: Not on file   Other Topics Concern     Not on file   Social History Narrative     Not on file         Review of Systems  Currently she denies chills and fever coughing wheezing chest pain dizziness or vertigo nausea vomiting diarrhea dysuria flulike symptoms headache or stiff neck.  History of cognitive impairment hyperlipidemia and syncopal episodes and most recently bilateral ankle fracture secondary to a fall.  Neuropathic pain.    Current Outpatient Medications:      acetaminophen (TYLENOL) 500 MG tablet, Take 1 tablet (500 mg total) by mouth every 6 (six) hours as needed for pain or fever., Disp: , Rfl: 0     aspirin 81 mg chewable tablet, Chew 1 tablet (81 mg total) 2 (two) times a day., Disp: , Rfl: 0     bisacodyl (DULCOLAX) 10 mg suppository, Insert suppository rectally daily as needed for treatment of constipation., Disp: , Rfl: 0     donepezil (ARICEPT) 10 MG tablet, Take 10 mg by mouth at bedtime., Disp: , Rfl:      gabapentin (NEURONTIN) 600 MG tablet, Take 600 mg by mouth 2 (two) times a day. , Disp: , Rfl:      magnesium hydroxide (MILK OF MAG) 400 mg/5 mL Susp suspension, Take 30 mL by mouth daily as needed., Disp: , Rfl: 0     polyethylene glycol (MIRALAX) 17 gram packet, Take 1 packet (17 g total) by mouth daily as needed., Disp: , Rfl: 0     polyvinyl alcohol (LIQUIFILM TEARS) 1.4 % ophthalmic solution, Apply 1 drop to eye as needed for dry eyes., Disp: , Rfl:      rosuvastatin (CRESTOR) 10 MG tablet, Take 10 mg by mouth daily. , Disp: , Rfl:     There were no vitals filed for this visit.  Blood pressure 116/74 pulse 88  respirations 18 temperature 97.1 saturation room air 96%  Physical Exam  Head is normocephalic.     Lungs are clear throughout.  Cardiovascular is normal without murmurs.   Gastrointestinal nondistended.  Musculoskeletal ;denies discomfort.  Bilateral lower extremities in Cam boots.  Positive CMS to her feet as well as popliteal pulses.  Psychiatric Pleasant affect.  Does have cognitive impairment.       LABS:   Lab Results   Component Value Date    CHOL 132 02/12/2020     Lab Results   Component Value Date    HDL 39 (L) 02/12/2020     Lab Results   Component Value Date    LDLCALC 56 02/12/2020     Lab Results   Component Value Date    TRIG 185 (H) 02/12/2020     No components found for: CHOLHDL      ASSESSMENT:      ICD-10-CM    1. Neuropathy  G62.9    2. Hypotension, unspecified hypotension type  I95.9    3. Cognitive impairment  R41.89    4. Closed fracture of both ankles with routine healing, subsequent encounter  S82.891D     S82.892D        PLAN:    Looks like there is a potential for discharge next week some time.  Should be going to her daughter's home rather than the [facility].  Her  also now lives at her daughter's home which they both used to live at the assisted living facility.  Otherwise she did not have any other questions or concerns she is feeling overall.      Electronically signed by: Michael Duane Johnson, CNP

## 2021-06-06 NOTE — PROGRESS NOTES
[facility]    Facility:   [facility] [[id number]]   Code Status: DNR      CHIEF COMPLAINT/REASON FOR VISIT:  Chief Complaint   Patient presents with     Follow Up     rehab, ankles       HISTORY:      HPI: Kat is a 84 y.o. female who I had the opportunity to revisit with secondary to her hospitalization December 29, 2019 through January 1, 2020 secondary to a syncopal episode and sustaining bilateral ankle fractures.  She recently did see orthopedics on February 19 she can weight-bear as tolerated in her cam boots.  Not having any pain.  No neuropathy.  Recently decreased her gabapentin 600 mg twice daily.  Recently did a lipid panel couple weeks ago she is on lovastatin.  Appetite good.  Does have cognitive impairment is on Aricept.  No bowel or bladder issues.  No pain and no Tylenol as needed.  I do not have any clue and no updated notes on how she is doing from a therapy standpoint but I am assuming they are going to keep her here at least another month or so.  She did not have any other questions or other concerns.  Her  does come and visit her on the transitional care unit.    Past Medical History:   Diagnosis Date     Dementia (H)      Hypotension      Neuropathy              No family history on file.  Social History     Socioeconomic History     Marital status:      Spouse name: Not on file     Number of children: Not on file     Years of education: Not on file     Highest education level: Not on file   Occupational History     Not on file   Social Needs     Financial resource strain: Not on file     Food insecurity:     Worry: Not on file     Inability: Not on file     Transportation needs:     Medical: Not on file     Non-medical: Not on file   Tobacco Use     Smoking status: Never Smoker     Smokeless tobacco: Never Used   Substance and Sexual Activity     Alcohol use: Not Currently     Drug use: Never     Sexual activity: Not Currently    Lifestyle     Physical activity:     Days per week: Not on file     Minutes per session: Not on file     Stress: Not on file   Relationships     Social connections:     Talks on phone: Not on file     Gets together: Not on file     Attends Denominational service: Not on file     Active member of club or organization: Not on file     Attends meetings of clubs or organizations: Not on file     Relationship status: Not on file     Intimate partner violence:     Fear of current or ex partner: Not on file     Emotionally abused: Not on file     Physically abused: Not on file     Forced sexual activity: Not on file   Other Topics Concern     Not on file   Social History Narrative     Not on file         Review of Systems  Currently she denies chills and fever coughing wheezing chest pain dizziness or vertigo nausea vomiting diarrhea dysuria flulike symptoms headache or stiff neck.  History of cognitive impairment hyperlipidemia and syncopal episodes and most recently bilateral ankle fracture secondary to a fall.  Neuropathic pain.        Current Outpatient Medications:      acetaminophen (TYLENOL) 500 MG tablet, Take 1 tablet (500 mg total) by mouth every 6 (six) hours as needed for pain or fever., Disp: , Rfl: 0     aspirin 81 mg chewable tablet, Chew 1 tablet (81 mg total) 2 (two) times a day., Disp: , Rfl: 0     bisacodyl (DULCOLAX) 10 mg suppository, Insert suppository rectally daily as needed for treatment of constipation., Disp: , Rfl: 0     donepezil (ARICEPT) 10 MG tablet, Take 10 mg by mouth at bedtime., Disp: , Rfl:      gabapentin (NEURONTIN) 600 MG tablet, Take 600 mg by mouth 2 (two) times a day. , Disp: , Rfl:      magnesium hydroxide (MILK OF MAG) 400 mg/5 mL Susp suspension, Take 30 mL by mouth daily as needed., Disp: , Rfl: 0     polyethylene glycol (MIRALAX) 17 gram packet, Take 1 packet (17 g total) by mouth daily as needed., Disp: , Rfl: 0     polyvinyl alcohol (LIQUIFILM TEARS) 1.4 % ophthalmic solution,  Apply 1 drop to eye as needed for dry eyes., Disp: , Rfl:      rosuvastatin (CRESTOR) 10 MG tablet, Take 10 mg by mouth daily. , Disp: , Rfl:   There were no vitals filed for this visit.  Blood pressure 114/67 pulse 71 respirations 18 temperature 98.4 saturation room air 96%      Physical Exam  Head is normocephalic.     Lungs are clear throughout.  Cardiovascular is normal without murmurs.  No lower extremity edema.  Gastrointestinal nondistended.  Musculoskeletal moves upper extremities.  Bilateral lower extremities in Cam boots.  Positive CMS to her feet as well as popliteal pulses.  Psychiatric Pleasant affect.  Does have cognitive impairment.       LABS:     Lab Results   Component Value Date    CHOL 132 02/12/2020     Lab Results   Component Value Date    HDL 39 (L) 02/12/2020     Lab Results   Component Value Date    LDLCALC 56 02/12/2020     Lab Results   Component Value Date    TRIG 185 (H) 02/12/2020     No components found for: CHOLHDL    ASSESSMENT:      ICD-10-CM    1. Closed fracture of both ankles with routine healing, subsequent encounter S82.891D     S82.892D    2. Cognitive impairment R41.89    3. Neuropathy G62.9    4. Hypotension, unspecified hypotension type I95.9        PLAN:    .Overall doing well no problems no other concerns no questions  Continue to work with therapy department.  Blood pressures have been good appetite is good.  No colds or flus.      Electronically signed by: Michael Duane Johnson, CNP

## 2021-06-06 NOTE — PROGRESS NOTES
[facility]    Facility:   [facility] [[id number]]   Code Status: DNR      CHIEF COMPLAINT/REASON FOR VISIT:  Chief Complaint   Patient presents with     Follow Up     ankles, rehab, ortho appt, bp       HISTORY:      HPI: Kat is a 85 y.o. female who is still in the transitional care unit secondary to her hospitalization December 29, 2019 through 21st 2020 secondary to bilateral ankle fractures.  Did have a chance to see orthopedics yesterday I do not have any of the notes yet I did have a chance to talk to therapy and they want to work with her another 2-3 more weeks she is ambulating as tolerated greater than 100 feet.  Also coincidentally she has been in the room independently ambulating and no recent fall or other injury.  She does have cognitive impairment.  Her  does come and visit with her through out the day.  Upon discharge from here she is in the go live with her daughter.  Her  and her used to have an apartment in this assisted living facility and they are both going to live with the daughter.  Otherwise she has been normotensive and afebrile and also on room air.  Getting extra nutrient supplements.  Denies pain.  Denies neuropathy.  Recently did check the cholesterol see results below.    Past Medical History:   Diagnosis Date     Dementia (H)      Hypotension      Neuropathy              No family history on file.  Social History     Socioeconomic History     Marital status:      Spouse name: Not on file     Number of children: Not on file     Years of education: Not on file     Highest education level: Not on file   Occupational History     Not on file   Social Needs     Financial resource strain: Not on file     Food insecurity:     Worry: Not on file     Inability: Not on file     Transportation needs:     Medical: Not on file     Non-medical: Not on file   Tobacco Use     Smoking status: Never Smoker     Smokeless tobacco:  Never Used   Substance and Sexual Activity     Alcohol use: Not Currently     Drug use: Never     Sexual activity: Not Currently   Lifestyle     Physical activity:     Days per week: Not on file     Minutes per session: Not on file     Stress: Not on file   Relationships     Social connections:     Talks on phone: Not on file     Gets together: Not on file     Attends Latter day service: Not on file     Active member of club or organization: Not on file     Attends meetings of clubs or organizations: Not on file     Relationship status: Not on file     Intimate partner violence:     Fear of current or ex partner: Not on file     Emotionally abused: Not on file     Physically abused: Not on file     Forced sexual activity: Not on file   Other Topics Concern     Not on file   Social History Narrative     Not on file         Review of Systems  Currently she denies chills and fever coughing wheezing chest pain dizziness or vertigo nausea vomiting diarrhea dysuria flulike symptoms headache or stiff neck.  History of cognitive impairment hyperlipidemia and syncopal episodes and most recently bilateral ankle fracture secondary to a fall.  Neuropathic pain.  Current Outpatient Medications   Medication Sig     acetaminophen (TYLENOL) 500 MG tablet Take 1 tablet (500 mg total) by mouth every 6 (six) hours as needed for pain or fever.     aspirin 81 mg chewable tablet Chew 1 tablet (81 mg total) 2 (two) times a day.     bisacodyl (DULCOLAX) 10 mg suppository Insert suppository rectally daily as needed for treatment of constipation.     donepezil (ARICEPT) 10 MG tablet Take 10 mg by mouth at bedtime.     gabapentin (NEURONTIN) 600 MG tablet Take 600 mg by mouth 2 (two) times a day.      magnesium hydroxide (MILK OF MAG) 400 mg/5 mL Susp suspension Take 30 mL by mouth daily as needed.     polyethylene glycol (MIRALAX) 17 gram packet Take 1 packet (17 g total) by mouth daily as needed.     polyvinyl alcohol (LIQUIFILM TEARS) 1.4 %  ophthalmic solution Apply 1 drop to eye as needed for dry eyes.     rosuvastatin (CRESTOR) 10 MG tablet Take 10 mg by mouth daily.        There were no vitals filed for this visit.  Blood pressure 111/74 pulse 65 respirations 18 temperature 98.3 saturation room air 96%  Physical Exam  Head is normocephalic.     Lungs are clear throughout.  Cardiovascular is normal without murmurs.  No lower extremity edema.  Gastrointestinal nondistended.  Musculoskeletal ;denies discomfort.  Bilateral lower extremities in Cam boots.  Positive CMS to her feet as well as popliteal pulses.  Psychiatric Pleasant affect.  Does have cognitive impairment.         LABS:   Lab Results   Component Value Date    WBC 7.5 12/30/2019    HGB 11.3 (L) 12/30/2019    HCT 39.1 12/29/2019    MCV 93 12/29/2019     12/29/2019     Lab Results   Component Value Date    CHOL 132 02/12/2020     Lab Results   Component Value Date    HDL 39 (L) 02/12/2020     Lab Results   Component Value Date    LDLCALC 56 02/12/2020     Lab Results   Component Value Date    TRIG 185 (H) 02/12/2020     No components found for: CHOLHDL      ASSESSMENT:      ICD-10-CM    1. Closed fracture of both ankles with routine healing, subsequent encounter S82.891D     S82.892D    2. Hypotension, unspecified hypotension type I95.9    3. Neuropathy G62.9    4. Cognitive impairment R41.89        PLAN:    No further changes with this particular visit.  Blood pressures look good.  Recently did see orthopedics on the fourth and I do not have any of those notes that therapy also was trying to look with the notes but they want to continue to progress and they think that she needs to be here at least another 2-3 more weeks.      Electronically signed by: Michael Duane Johnson, CNP

## 2021-06-06 NOTE — PROGRESS NOTES
[facility]    Facility:   [facility] [[id number]]   Code Status: DNR      CHIEF COMPLAINT/REASON FOR VISIT:  Chief Complaint   Patient presents with     Follow Up     rehab, ankles, htn,       HISTORY:      HPI: Kat is a 85 y.o. female who I had the opportunity to revisit with secondary to her hospitalization December 29, 2019 through January 1, 2020 secondary to bilateral ankle fractures.  Currently has been participating in therapy.  Not having any discomfort.  The neuropathy is also well controlled on the gabapentin.  Not requiring any Tylenol as needed for pain.  Is actually independently ambulating in her room at this time has been given full weightbearing.  Getting extra nutrient supplements.  Normotensive afebrile and also on room air.  Good appetite.  Sleeping well at night    Past Medical History:   Diagnosis Date     Dementia (H)      Hypotension      Neuropathy              No family history on file.  Social History     Socioeconomic History     Marital status:      Spouse name: Not on file     Number of children: Not on file     Years of education: Not on file     Highest education level: Not on file   Occupational History     Not on file   Social Needs     Financial resource strain: Not on file     Food insecurity     Worry: Not on file     Inability: Not on file     Transportation needs     Medical: Not on file     Non-medical: Not on file   Tobacco Use     Smoking status: Never Smoker     Smokeless tobacco: Never Used   Substance and Sexual Activity     Alcohol use: Not Currently     Drug use: Never     Sexual activity: Not Currently   Lifestyle     Physical activity     Days per week: Not on file     Minutes per session: Not on file     Stress: Not on file   Relationships     Social connections     Talks on phone: Not on file     Gets together: Not on file     Attends Synagogue service: Not on file     Active member of club or organization:  Not on file     Attends meetings of clubs or organizations: Not on file     Relationship status: Not on file     Intimate partner violence     Fear of current or ex partner: Not on file     Emotionally abused: Not on file     Physically abused: Not on file     Forced sexual activity: Not on file   Other Topics Concern     Not on file   Social History Narrative     Not on file         Review of Systems  Currently she denies chills and fever coughing wheezing chest pain dizziness or vertigo nausea vomiting diarrhea dysuria flulike symptoms headache or stiff neck.  History of cognitive impairment hyperlipidemia and syncopal episodes and most recently bilateral ankle fracture secondary to a fall.  Neuropathic pain.       Current Outpatient Medications   Medication Sig     acetaminophen (TYLENOL) 500 MG tablet Take 1 tablet (500 mg total) by mouth every 6 (six) hours as needed for pain or fever.     aspirin 81 mg chewable tablet Chew 1 tablet (81 mg total) 2 (two) times a day.     bisacodyl (DULCOLAX) 10 mg suppository Insert suppository rectally daily as needed for treatment of constipation.     donepezil (ARICEPT) 10 MG tablet Take 10 mg by mouth at bedtime.     gabapentin (NEURONTIN) 600 MG tablet Take 600 mg by mouth 2 (two) times a day.      magnesium hydroxide (MILK OF MAG) 400 mg/5 mL Susp suspension Take 30 mL by mouth daily as needed.     polyethylene glycol (MIRALAX) 17 gram packet Take 1 packet (17 g total) by mouth daily as needed.     polyvinyl alcohol (LIQUIFILM TEARS) 1.4 % ophthalmic solution Apply 1 drop to eye as needed for dry eyes.     rosuvastatin (CRESTOR) 10 MG tablet Take 10 mg by mouth daily.        There were no vitals filed for this visit.  Blood pressure 119/75 pulse 64 respirations 16 temperature 98.4 saturation room air 96%  Physical Exam  Head is normocephalic.     Lungs are clear throughout.  Cardiovascular is normal without murmurs.  No lower extremity edema.  Gastrointestinal  nondistended.  Musculoskeletal ;denies discomfort.  Bilateral lower extremities in Cam boots.  Positive CMS to her feet as well as popliteal pulses.  Psychiatric Pleasant affect.  Does have cognitive impairment.         LABS:   Lab Results   Component Value Date    WBC 7.5 12/30/2019    HGB 11.3 (L) 12/30/2019    HCT 39.1 12/29/2019    MCV 93 12/29/2019     12/29/2019         ASSESSMENT:      ICD-10-CM    1. Closed fracture of both ankles with routine healing, subsequent encounter  S82.891D     S82.892D    2. Hypotension, unspecified hypotension type  I95.9    3. Cognitive impairment  R41.89    4. Neuropathy  G62.9        PLAN    No further changes at this time regarding medications as well as the rehabilitation process.  Do not see any recent notes regarding the future plans.  I know eventually in talking with the daughter that they will be eventually going to her house and not going back to the assisted living facility.      Electronically signed by: Michael Duane Johnson, CNP

## 2021-06-06 NOTE — PROGRESS NOTES
[facility]    Facility:   [facility] [[id number]]   Code Status: DNR      CHIEF COMPLAINT/REASON FOR VISIT:  Chief Complaint   Patient presents with     Follow Up     rehab, jessy       HISTORY:      HPI: Kat is a 84 y.o. female who I the opportunity revisiting with secondary to her hospitalization December 29 through January 1, 2020 secondary syncope and bilateral ankle fractures.  Did see orthopedics on the 29th her next visit is February 24.  Still nonweightbearing.  No pain issues she does have neuropathy gabapentin 700 mg twice daily have not been a decrease at the 600 mg twice daily I do not think she needs that much.  Does not have any hypotensive issues has been normotensive and afebrile and also on room air.  Denies any bowel or bladder issues.  Is on Crestor 10 mg.  I think at this point she is due to have her cholesterol checked since she is been here quite a while and it was seen thing in the computer.  Therapy is pretty limited.  She did not have any other questions or other issues.  She seems to be very pleasant and cooperative.    Past Medical History:   Diagnosis Date     Dementia (H)      Hypotension      Neuropathy              No family history on file.  Social History     Socioeconomic History     Marital status:      Spouse name: Not on file     Number of children: Not on file     Years of education: Not on file     Highest education level: Not on file   Occupational History     Not on file   Social Needs     Financial resource strain: Not on file     Food insecurity:     Worry: Not on file     Inability: Not on file     Transportation needs:     Medical: Not on file     Non-medical: Not on file   Tobacco Use     Smoking status: Never Smoker     Smokeless tobacco: Never Used   Substance and Sexual Activity     Alcohol use: Not Currently     Drug use: Never     Sexual activity: Not Currently   Lifestyle     Physical activity:     Days per  week: Not on file     Minutes per session: Not on file     Stress: Not on file   Relationships     Social connections:     Talks on phone: Not on file     Gets together: Not on file     Attends Jew service: Not on file     Active member of club or organization: Not on file     Attends meetings of clubs or organizations: Not on file     Relationship status: Not on file     Intimate partner violence:     Fear of current or ex partner: Not on file     Emotionally abused: Not on file     Physically abused: Not on file     Forced sexual activity: Not on file   Other Topics Concern     Not on file   Social History Narrative     Not on file         Review of Systems  Currently she denies chills and fever coughing wheezing chest pain dizziness or vertigo nausea vomiting diarrhea dysuria flulike symptoms headache or stiff neck.  History of cognitive impairment hyperlipidemia and syncopal episodes and most recently bilateral ankle fracture secondary to a fall.  Neuropathic pain.    Current Outpatient Medications   Medication Sig     acetaminophen (TYLENOL) 500 MG tablet Take 1 tablet (500 mg total) by mouth every 6 (six) hours as needed for pain or fever.     aspirin 81 mg chewable tablet Chew 1 tablet (81 mg total) 2 (two) times a day.     bisacodyl (DULCOLAX) 10 mg suppository Insert suppository rectally daily as needed for treatment of constipation.     donepezil (ARICEPT) 10 MG tablet Take 10 mg by mouth at bedtime.     gabapentin (NEURONTIN) 600 MG tablet Take 600 mg by mouth 2 (two) times a day.      magnesium hydroxide (MILK OF MAG) 400 mg/5 mL Susp suspension Take 30 mL by mouth daily as needed.     polyethylene glycol (MIRALAX) 17 gram packet Take 1 packet (17 g total) by mouth daily as needed.     polyvinyl alcohol (LIQUIFILM TEARS) 1.4 % ophthalmic solution Apply 1 drop to eye as needed for dry eyes.     rosuvastatin (CRESTOR) 10 MG tablet Take 10 mg by mouth daily.        There were no vitals filed for this  visit.  Blood pressure 110/65 pulse 67 respirations 16 temperature 98.1 saturation room air 96%  Physical Exam  Head is normocephalic.     Lungs are clear throughout.  Cardiovascular is normal without murmurs.  No lower extremity edema.  Gastrointestinal nondistended.  Musculoskeletal moves upper extremities.  Bilateral lower extremities in Cam boots.  Positive CMS to her feet as well as popliteal pulses.  Psychiatric Pleasant affect.  Does have cognitive impairment.             LABS:   Lab Results   Component Value Date    WBC 7.5 12/30/2019    HGB 11.3 (L) 12/30/2019    HCT 39.1 12/29/2019    MCV 93 12/29/2019     12/29/2019         ASSESSMENT:      ICD-10-CM    1. Closed fracture of both ankles with routine healing, subsequent encounter S82.891D     S82.892D    2. Hypotension, unspecified hypotension type I95.9    3. Cognitive impairment R41.89    4. Neuropathy G62.9        PLAN:    Adding in a lipid panel because she is on Crestor and then also decreasing the gabapentin 600 mg twice daily.  Does not see orthopedics again until February 24 and is nonweightbearing.      Electronically signed by: Michael Duane Johnson, CNP

## 2021-06-06 NOTE — PROGRESS NOTES
[facility]    Facility:   [facility] [[id number]]   Code Status: DNR      CHIEF COMPLAINT/REASON FOR VISIT:  Chief Complaint   Patient presents with     Follow Up     rehab, ankles.       HISTORY:      HPI: Kat is a 84 y.o. female who is still in the transitional care unit secondary to her hospitalization December 29 through January 1, 2020 secondary syncope and did sustain bilateral ankle fractures.  Nonsurgical.  She is currently on lovastatin 10 mg.  Has not had any recent cholesterol see results below but the cholesterol on February 12 total was 132 LDL of 56 triglycerides 185 HDL 39.  She has not had any orthostatic issues currently not on any other blood pressure medications.  Not having any significant discomfort no Tylenol as needed.  Also recently reduced Neurontin 600 mg twice a day and is been no reports of any issues.  Appetite good.  Still nonweightbearing follow-up with orthopedics again on February 24.  Does have cognitive impairment and is on Aricept.    Past Medical History:   Diagnosis Date     Dementia (H)      Hypotension      Neuropathy              No family history on file.  Social History     Socioeconomic History     Marital status:      Spouse name: Not on file     Number of children: Not on file     Years of education: Not on file     Highest education level: Not on file   Occupational History     Not on file   Social Needs     Financial resource strain: Not on file     Food insecurity:     Worry: Not on file     Inability: Not on file     Transportation needs:     Medical: Not on file     Non-medical: Not on file   Tobacco Use     Smoking status: Never Smoker     Smokeless tobacco: Never Used   Substance and Sexual Activity     Alcohol use: Not Currently     Drug use: Never     Sexual activity: Not Currently   Lifestyle     Physical activity:     Days per week: Not on file     Minutes per session: Not on file     Stress: Not on  file   Relationships     Social connections:     Talks on phone: Not on file     Gets together: Not on file     Attends Temple service: Not on file     Active member of club or organization: Not on file     Attends meetings of clubs or organizations: Not on file     Relationship status: Not on file     Intimate partner violence:     Fear of current or ex partner: Not on file     Emotionally abused: Not on file     Physically abused: Not on file     Forced sexual activity: Not on file   Other Topics Concern     Not on file   Social History Narrative     Not on file         Review of Systems  Currently she denies chills and fever coughing wheezing chest pain dizziness or vertigo nausea vomiting diarrhea dysuria flulike symptoms headache or stiff neck.  History of cognitive impairment hyperlipidemia and syncopal episodes and most recently bilateral ankle fracture secondary to a fall.  Neuropathic pain.      Current Outpatient Medications:      acetaminophen (TYLENOL) 500 MG tablet, Take 1 tablet (500 mg total) by mouth every 6 (six) hours as needed for pain or fever., Disp: , Rfl: 0     aspirin 81 mg chewable tablet, Chew 1 tablet (81 mg total) 2 (two) times a day., Disp: , Rfl: 0     bisacodyl (DULCOLAX) 10 mg suppository, Insert suppository rectally daily as needed for treatment of constipation., Disp: , Rfl: 0     donepezil (ARICEPT) 10 MG tablet, Take 10 mg by mouth at bedtime., Disp: , Rfl:      gabapentin (NEURONTIN) 600 MG tablet, Take 600 mg by mouth 2 (two) times a day. , Disp: , Rfl:      magnesium hydroxide (MILK OF MAG) 400 mg/5 mL Susp suspension, Take 30 mL by mouth daily as needed., Disp: , Rfl: 0     polyethylene glycol (MIRALAX) 17 gram packet, Take 1 packet (17 g total) by mouth daily as needed., Disp: , Rfl: 0     polyvinyl alcohol (LIQUIFILM TEARS) 1.4 % ophthalmic solution, Apply 1 drop to eye as needed for dry eyes., Disp: , Rfl:      rosuvastatin (CRESTOR) 10 MG tablet, Take 10 mg by mouth  daily. , Disp: , Rfl:     There were no vitals filed for this visit.  Blood pressure 104/65 pulse 87 respirations 18 saturation room air 92%  Physical Exam  Head is normocephalic.     Lungs are clear throughout.  Cardiovascular is normal without murmurs.  No lower extremity edema.  Gastrointestinal nondistended.  Musculoskeletal moves upper extremities.  Bilateral lower extremities in Cam boots.  Positive CMS to her feet as well as popliteal pulses.  Psychiatric Pleasant affect.  Does have cognitive impairment.       LABS:   Lab Results   Component Value Date    WBC 7.5 12/30/2019    HGB 11.3 (L) 12/30/2019    HCT 39.1 12/29/2019    MCV 93 12/29/2019     12/29/2019     Lab Results   Component Value Date    CHOL 132 02/12/2020     Lab Results   Component Value Date    HDL 39 (L) 02/12/2020     Lab Results   Component Value Date    LDLCALC 56 02/12/2020     Lab Results   Component Value Date    TRIG 185 (H) 02/12/2020     No components found for: CHOLHDL      ASSESSMENT:      ICD-10-CM    1. Cognitive impairment R41.89    2. Closed fracture of both ankles with routine healing, subsequent encounter S82.891D     S82.892D    3. Neuropathy G62.9    4. Vasovagal syncope R55        PLAN:    Still nonweightbearing but can do some active range of motion.  Next orthopedic visit is February 24.  The cholesterol looks pretty good there is been no changes in pain since decreasing gabapentin 600 mg twice daily.       Electronically signed by: Michael Duane Johnson, CNP

## 2021-06-06 NOTE — TELEPHONE ENCOUNTER
[facility] Nurse Triage Telephone Note      Provider: DAVID Qureshi  Facility: [facility] Type: TCU    Caller: Michael   Call Back Number:  [phone]    Allergies: Patient has no known allergies.    Reason for call: Pt was in room with  and the nurse noticed that the  had brought in some Champagne and the nurse was calling wondering if it was ok      Verbal Order/Direction given by Provider: -1 alcoholic beverage (12 oz. beer, 5 oz. wine or 1 oz. liquor in mixed drink or 6oz champagne) prn     Provider giving order: DAVID Qureshi    Verbal order given to: Michael Avalos RN

## 2021-06-06 NOTE — PROGRESS NOTES
[facility]    Facility:   [facility] [[id number]]   Code Status: DNR      CHIEF COMPLAINT/REASON FOR VISIT:  Chief Complaint   Patient presents with     Follow Up     rehab, ankles.       HISTORY:      HPI: Kat is a 85 y.o. female who I had the chance to revisit with secondary to her hospitalization December 29, 2019 through January 1, 2020 secondary syncopal episode and did sustain bilateral ankle fractures.  Currently enrolled in therapy.  Recently did have a visit with orthopedics on the 24th she is now weightbearing as tolerated in a cam boots.  According to therapy they want to keep her here another 3 weeks she is ambulating about 50 feet.  Also we had a chance to talk about her birthday yesterday she is now 85 years old.  She has a nice bouquet of flowers as well as cookies in the room.  The  did bring in some alcohol on apparently the therapy group has been aware that she has been having a night cap for the last couple of weeks of wine or champagne type of product and so yesterday I did receive a phone call asking if that could be on her order list as well since she is already been doing this and this is a part of her normal home routine with her  so we will go ahead and give her the okay for a glass of wine or champagne every night as needed since this is her home routine.  Currently though she has been normotensive and afebrile and also on room air.  Her ankles are nontender.  No neuropathy.  Getting extra nutrient supplements.  Couple weeks so did her cholesterol check see results below.  Not having any other complaints or problems.    Past Medical History:   Diagnosis Date     Dementia (H)      Hypotension      Neuropathy              No family history on file.  Social History     Socioeconomic History     Marital status:      Spouse name: Not on file     Number of children: Not on file     Years of education: Not on file     Highest  education level: Not on file   Occupational History     Not on file   Social Needs     Financial resource strain: Not on file     Food insecurity:     Worry: Not on file     Inability: Not on file     Transportation needs:     Medical: Not on file     Non-medical: Not on file   Tobacco Use     Smoking status: Never Smoker     Smokeless tobacco: Never Used   Substance and Sexual Activity     Alcohol use: Not Currently     Drug use: Never     Sexual activity: Not Currently   Lifestyle     Physical activity:     Days per week: Not on file     Minutes per session: Not on file     Stress: Not on file   Relationships     Social connections:     Talks on phone: Not on file     Gets together: Not on file     Attends Restorationist service: Not on file     Active member of club or organization: Not on file     Attends meetings of clubs or organizations: Not on file     Relationship status: Not on file     Intimate partner violence:     Fear of current or ex partner: Not on file     Emotionally abused: Not on file     Physically abused: Not on file     Forced sexual activity: Not on file   Other Topics Concern     Not on file   Social History Narrative     Not on file         Review of Systems  Currently she denies chills and fever coughing wheezing chest pain dizziness or vertigo nausea vomiting diarrhea dysuria flulike symptoms headache or stiff neck.  History of cognitive impairment hyperlipidemia and syncopal episodes and most recently bilateral ankle fracture secondary to a fall.  Neuropathic pain.      Current Outpatient Medications:      acetaminophen (TYLENOL) 500 MG tablet, Take 1 tablet (500 mg total) by mouth every 6 (six) hours as needed for pain or fever., Disp: , Rfl: 0     aspirin 81 mg chewable tablet, Chew 1 tablet (81 mg total) 2 (two) times a day., Disp: , Rfl: 0     bisacodyl (DULCOLAX) 10 mg suppository, Insert suppository rectally daily as needed for treatment of constipation., Disp: , Rfl: 0     donepezil  (ARICEPT) 10 MG tablet, Take 10 mg by mouth at bedtime., Disp: , Rfl:      gabapentin (NEURONTIN) 600 MG tablet, Take 600 mg by mouth 2 (two) times a day. , Disp: , Rfl:      magnesium hydroxide (MILK OF MAG) 400 mg/5 mL Susp suspension, Take 30 mL by mouth daily as needed., Disp: , Rfl: 0     polyethylene glycol (MIRALAX) 17 gram packet, Take 1 packet (17 g total) by mouth daily as needed., Disp: , Rfl: 0     polyvinyl alcohol (LIQUIFILM TEARS) 1.4 % ophthalmic solution, Apply 1 drop to eye as needed for dry eyes., Disp: , Rfl:      rosuvastatin (CRESTOR) 10 MG tablet, Take 10 mg by mouth daily. , Disp: , Rfl:     There were no vitals filed for this visit.  Blood pressure 99/61 pulse 77 respirations 18 temperature 97.6 saturation room air 96%  Physical Exam  Head is normocephalic.     Lungs are clear throughout.  Cardiovascular is normal without murmurs.  No lower extremity edema.  Gastrointestinal nondistended.  Musculoskeletal moves upper extremities.  Bilateral lower extremities in Cam boots.  Positive CMS to her feet as well as popliteal pulses.  Psychiatric Pleasant affect.  Does have cognitive impairment.        LABS:   Lab Results   Component Value Date    WBC 7.5 12/30/2019    HGB 11.3 (L) 12/30/2019    HCT 39.1 12/29/2019    MCV 93 12/29/2019     12/29/2019     Lab Results   Component Value Date    CHOL 132 02/12/2020     Lab Results   Component Value Date    HDL 39 (L) 02/12/2020     Lab Results   Component Value Date    LDLCALC 56 02/12/2020     Lab Results   Component Value Date    TRIG 185 (H) 02/12/2020     No components found for: CHOLHDL      ASSESSMENT:      ICD-10-CM    1. Hypotension, unspecified hypotension type I95.9    2. Neuropathy G62.9    3. Closed fracture of both ankles with routine healing, subsequent encounter S82.891D     S82.892D    4. Chronic bilateral low back pain without sciatica M54.5     G89.29        PLAN:    Continue to manage and follow her chronic medical conditions.   Once again therapy wants to keep her another 3 weeks with ambulation and working with her strength.  Otherwise we will give the okay for night cap with her  since this is part of her normal routine.      Electronically signed by: Michael Duane Johnson, CNP

## 2021-06-07 NOTE — PROGRESS NOTES
[facility]    Facility:   [facility] [[id number]]   Code Status: POLST AVAILABLE  PCP: Ayse Cornejo MD   Phone: [phone]   Fax: [phone]      CHIEF COMPLAINT/REASON FOR VISIT:  Chief Complaint   Patient presents with     Discharge Summary       HISTORY COURSE:    HPI: Kat is a 85 y.o. female who I had the chance to revisit with secondary to her hospitalization December 29, 2019 through January 1, 2020 secondary to bilateral ankle fractures.  She has seen orthopedics and now have given her full range of ambulation as tolerated.  She has been doing quite well no falls or other problems.  Her pain is well in check she does have a history of neuropathy currently on gabapentin.  She has been normotensive and afebrile and also on room air.  Appetite is good.  Denies any bowel or bladder problems.      Today Ms. Up is being evaluated for a review of multiple medical conditions prior to TCU discharge.  She denied any concerns at this time and is looking forward to going home with her daughter today.  The patient denied lightheadedness, dizziness, breathing difficulty, chest pain, palpitations, constipation, urinary symptoms, numbness or tingling in extremities, and pain.     PHYSICAL EXAM:     General Appearance:    Alert, cooperative, no distress, appears stated age   Head:    Normocephalic, without obvious abnormality, atraumatic   Eyes:    PERRL, conjunctiva/corneas clear, both eyes   Ears:    Normal external ear canals, both ears   Nose:   Nares normal, septum midline, mucosa normal, no drainage    or sinus tenderness   Throat:   Lips, mucosa, and tongue normal; teeth and gums normal   Neck:   Supple, symmetrical, trachea midline, no adenopathy;     thyroid:  no enlargement/tenderness/nodules; no carotid    bruit or JVD   Back:     Symmetric, no curvature, ROM normal, no CVA tenderness   Lungs:     Clear to auscultation bilaterally, respirations  unlabored   Chest Wall:    No tenderness or deformity    Heart:    Regular rate and rhythm, S1 and S2 normal, no murmur, rub   or gallop   Abdomen:     Soft, non-tender, bowel sounds active all four quadrants,     no masses, no organomegaly   Extremities:   Extremities normal, atraumatic, no cyanosis or edema   Pulses:   2+ and symmetric all extremities   Skin:   Skin color, texture, turgor normal, no rashes or lesions   Neurologic:   Oriented to person, place, time, and situation; exhibits logical thought processes; generalized weakness       MEDICATION LIST:  Current Outpatient Medications   Medication Sig     aspirin 81 mg chewable tablet Chew 1 tablet (81 mg total) 2 (two) times a day.     donepeziL (ARICEPT) 10 MG tablet Take 1 tablet (10 mg total) by mouth at bedtime.     gabapentin (NEURONTIN) 600 MG tablet Take 1 tablet (600 mg total) by mouth 2 (two) times a day.     polyvinyl alcohol (LIQUIFILM TEARS) 1.4 % ophthalmic solution Apply 1 drop to eye as needed for dry eyes.     rosuvastatin (CRESTOR) 10 MG tablet Take 1 tablet (10 mg total) by mouth daily.       DISCHARGE DIAGNOSIS:    ICD-10-CM    1. Closed fracture of both ankles with routine healing, subsequent encounter  S82.891D     S82.892D    2. Neuropathy  G62.9    3. Cognitive impairment  R41.89    4. Chronic bilateral low back pain without sciatica  M54.5     G89.29      Continue Tylenol and gabapentin for pain managemet s/p bilateral ankle fractures.  Otherwise continue current care plan for all other chronic medical conditions, as they are stable. Encouraged patient to engage in healthy lifestyle behaviors such as engaging in social activities, exercising (PT/OT), eating well, and following care plan. Follow up for routine check-up, or sooner if needed. Will continue to monitor patient and work with nursing staff collaboratively to work toward positive patient outcomes.     MEDICAL EQUIPMENT NEEDS:  The patient has mobility limitation significantly  impairing her ability to participate in mobility-related activities of daily living, such as toileting, feeding, dressing, grooming, and bathing in customary locations in the home. The mobility limitation cannot be sufficiently and safely resolved by use of an appropriately fitted walker. The patient or caregiver is able to safely use a walker. The patient's functional mobility deficit can be sufficiently resolved by the use of a walker. The patient has mobility limitations that impairs her ability to perform ADLs without use of a walker to be mobile in the home.      DISCHARGE PLAN/FACE TO FACE:  I certify that services are/were furnished while this patient was under the care of a physician and that a physician or an allowed non-physician practitioner (NPP), had a face-to-face encounter that meets the physician face-to-face encounter requirements. The encounter was in whole, or in part, related to the primary reason for home health. The patient is confined to her home and needs intermittent skilled nursing, physical therapy, speech-language pathology, or the continued need for occupational therapy. A plan of care has been established by a physician and is periodically reviewed by a physician.  Date of Face-to-Face Encounter: 3/17/20    I certify that, based on my findings, the following services are medically necessary home health services: home health aid for bathing and ADLs, skilled nursing (RN) for medication set-up and teaching, symptoms and disease process monitoring and education, PT for strengthening, balance, endurance and safety within the home, OT for strengthening, ADL needs, adaptive equipment and safety.     My clinical findings support the need for the above skilled services because: (Please write a brief narrative summary that describes what the RN, PT, SLP, or other services will be doing in the home. A list of diagnoses in this section does not meet the CMS requirements.) home health aid for  bathing and ADLs, skilled nursing (RN) for medication set-up and teaching, symptoms and disease process monitoring and education, PT for strengthening, balance, endurance and safety within the home, OT for strengthening, ADL needs, adaptive equipment and safety.     This patient is homebound because: (Please write a brief narrative summary describing the functional limitations as to why this patient is homebound and specifically what makes this patient homebound.) she is a frail elderly woman with multiple comorbidities and recent hospitalizations making it unsafe for her to go out into the community for services.     The patient is, or has been, under my care and I have initiated the establishment of the plan of care. This patient will be followed by a physician who will periodically review the plan of care.    Schedule follow up visit with primary care provider within 7 days to reestablish care.    Electronically signed by: Sylvia Dickey CNP

## 2021-06-07 NOTE — PROGRESS NOTES
"Kat Up is a 85 y.o. female who is being evaluated via a billable telephone visit.      The patient has been notified of following:     \"This telephone visit will be conducted via a call between you and your physician/provider. We have found that certain health care needs can be provided without the need for a physical exam.  This service lets us provide the care you need with a short phone conversation.  If a prescription is necessary we can send it directly to your pharmacy.  If lab work is needed we can place an order for that and you can then stop by our lab to have the test done at a later time.    If during the course of the call the physician/provider feels a telephone visit is not appropriate, you will not be charged for this service.\"         Kat Up complains of    Chief Complaint   Patient presents with     Establish Care     Just released from [facility] x 2.5 months, fx bilat ankles / going to live at daughter home and doing PT, OT / Medical management / mild incontience   moved from [place] . Suffered bilateral ankle fracture and was seen by [facility] ortho put in non weight bearing and boots and had serial x rays done . Is now going to get PT done at home .   Bilateral ankle swellings noted for the past few months . There is mild indentation,  no redness , no pain , swelling is symmetrical . Has been seen by home care nurse who recommended leg elevation . No h/o prior fractures .   Is mildly incontinent in the morning but not in the day .  I have reviewed and updated the patient's Past Medical History, Social History, Family History and Medication List.    ALLERGIES  Patient has no known allergies.    Additional provider notes: unknown if she has a known history of osteoporosis . Was on midodrine for I assume orthostatic hypotension but currently home bps are normal . No h/o stroke or MI . No other chronic disease like diabetes or asthma . She has no felt faint or dizzzy but is " sedentary    H/o cognitive problems on Aricept for the apt 8 years but no progression of symptoms . Has no problems in ADLs, swallowing or walking . prior to fractures .   Assessment/Plan:  1. Vasovagal syncope  Recommend not taking the midridone . She wasn't taking it at the TCU or at home . BP;s have been ok . Recommend staying off it . Having PT check her bp when she stands up for therapy   2. Chronic bilateral low back pain without sciatica  Is on gabapentin  . Tolerating it well . Continue for now  No indication to increase it     3. Cognitive impairment  Presumptive Alzheimer but not sure if it is the true diagnosis   Continue aricept   4. Closed fracture of both ankles with routine healing, subsequent encounter  Most likely has presumptive osteoporosis . Consider fosamax but because I have not seen patient will defer it for now unless we cannot still see patients in the next few months   Do not recommend lasix for the edema . No clinical evidence of hert failure, renal failure or DVT . Labs done 3 months ago were reviewed Leg elevation best option . Reassure her that once she is walking it should get better   5. Hyperlipidemia, unspecified hyperlipidemia type  Continue statin     6. Mild incontinence  No medication indicated at this time . Recommend depends     Health maintenance : other than reviewing pneumococcal , and dexa and shingles . She is not needing any other investigations   Results for orders placed or performed during the hospital encounter of 12/29/19   Basic metabolic panel   Result Value Ref Range    Sodium 140 136 - 145 mmol/L    Potassium 4.2 3.5 - 5.0 mmol/L    Chloride 107 98 - 107 mmol/L    CO2 25 22 - 31 mmol/L    Anion Gap, Calculation 8 5 - 18 mmol/L    Glucose 96 70 - 125 mg/dL    Calcium 9.1 8.5 - 10.5 mg/dL    BUN 19 8 - 28 mg/dL    Creatinine 0.87 0.60 - 1.10 mg/dL    GFR MDRD Af Amer >60 >60 mL/min/1.73m2    GFR MDRD Non Af Amer >60 >60 mL/min/1.73m2       Lab Results   Component  Value Date    WBC 7.5 12/30/2019    HGB 11.3 (L) 12/30/2019    HCT 39.1 12/29/2019    MCV 93 12/29/2019     12/29/2019           Phone call duration:  25 minutes    Mariia Green CMA

## 2021-06-07 NOTE — TELEPHONE ENCOUNTER
FYI - Status Update  Who is Calling: Child  Update: Anahi was checking to see if she can get the patient signed up for home care. Caller was informed of Dr. Jo's message below to make a 40 minute telephone visit to discuss this and to establish care.    Caller was transferred to scheduling.  Okay to leave a detailed message?:  No return call needed

## 2021-06-07 NOTE — TELEPHONE ENCOUNTER
FYI - Status Update  Who is Calling: Home Care  Update: Patient was discharged today from home care.  Okay to leave a detailed message?:  No return call needed

## 2021-06-15 NOTE — PROGRESS NOTES
Assessment and Plan:     Patient has been advised of split billing requirements and indicates understanding: Yes  1. Alzheimer's dementia without behavioral disturbance, unspecified timing of dementia onset (H)  mincog is zero . Symptoms and clinical progression typical of alzheimers . She has had  CT head without contrast .has not had full cognitive testing done . She is very articulate but on taking history repeats herself and has obvious lack of retention . However has no motor manifestations . Is doing her ADLS herself . No swallowing problems .  helps with meds . There is some intermittent behavioural issues where she snaps at her grandchildren . But is not aggressive or significantly agitated . I do sense anxiety is playing a role . Trial of low dose lexapro in addition   - HM1(CBC and Differential)  - Thyroid Stimulating Hormone (TSH)  - Comprehensive Metabolic Panel  - Methylmalonic Acid,Serum or Plasma (Vitamin B12 Status)  - escitalopram oxalate (LEXAPRO) 10 MG tablet; Take 1 tablet (10 mg total) by mouth daily.  Dispense: 30 tablet; Refill: 2    2. Vitamin D insufficiency    - Vitamin D, Total (25-Hydroxy)  - acetaminophen (TYLENOL EXTRA STRENGTH) 500 MG tablet; Take 1 tablet (500 mg total) by mouth 2 (two) times a day.  Dispense: 100 tablet; Refill: 2    3. Age-related osteoporosis without current pathological fracture  She should get dexa scan .   - DXA Bone Density Scan; Future  - calcium, as carbonate, (TUMS) 200 mg calcium (500 mg) chewable tablet; Chew 1 tablet (200 mg total) 2 (two) times a day.  Dispense: 90 tablet; Refill: 3  - cholecalciferol, vitamin D3, (VITAMIN D3) 50 mcg (2,000 unit) Tab; ONCE DAILY  Dispense: 90 tablet; Refill: 0    4. Mild incontinence  She is managing to go to bathroom on time . Will defer adding any medicaiton with potential anticholinergic side effects     5. Chronic bilateral low back pain without sciatica  Is on gabapentin . Recommend scheduled tyelenol as  well     6. Closed fracture of both ankles with routine healing, subsequent encounter  Walking very well   She has no record of tetanus and pneunmonia vaccines . She needs to compelte covid shot . Will give pneumonia shot at her next visit         The patient's current medical problems were reviewed.    I have had an Advance Directives discussion with the patient.  The following health maintenance schedule was reviewed with the patient and provided in printed form in the after visit summary:   Health Maintenance Due   Topic Date Due     Pneumococcal Vaccine: 65+ Years (1 of 1 - PPSV23) Never done     ZOSTER VACCINES (2 of 3) 02/15/2011     TD 18+ HE  12/21/2020     COVID-19 Vaccine (2 of 2 - Pfizer series) 03/12/2021     FALL RISK ASSESSMENT  03/23/2021        Subjective:   Chief Complaint: Kat Up is an 86 y.o. female here for an Annual Wellness visit.   HPI:  Pleasant patient with alzheimers dementia here with her  Daughter . She lives with her daughter who has 6 kids . Three adults and three at home , doing home schooling . She is able to communicate with me clearly and says she has some mild low back pain . Doesn't really interfere with her normal walks   no shortness of breath ,no  chest pain ,no  Falls, occasional incontinence with urgency  , no weight changes , sleep and mood have been good . Independent in ADLS and IADLS       Review of Systems: Please see above.  The rest of the review of systems are negative for all systems.    Patient Care Team:  Ayse Cornejo MD as PCP - General (Internal Medicine)  Johnson, Michael Duane, CNP as Assigned PCP     Patient Active Problem List   Diagnosis     Chronic bilateral low back pain without sciatica     Chronic pain of right lower extremity     Hypotension, unspecified hypotension type     Cognitive impairment     Neuropathy     Hyperlipidemia, unspecified hyperlipidemia type     Vasovagal syncope     Closed fracture of both ankles with routine  healing, subsequent encounter     Mild incontinence     Past Medical History:   Diagnosis Date     Dementia (H)      Hypotension      Neuropathy       No past surgical history on file.   No family history on file.   Social History     Socioeconomic History     Marital status:      Spouse name: Not on file     Number of children: Not on file     Years of education: Not on file     Highest education level: Not on file   Occupational History     Not on file   Social Needs     Financial resource strain: Not on file     Food insecurity     Worry: Not on file     Inability: Not on file     Transportation needs     Medical: Not on file     Non-medical: Not on file   Tobacco Use     Smoking status: Never Smoker     Smokeless tobacco: Never Used   Substance and Sexual Activity     Alcohol use: Not Currently     Drug use: Never     Sexual activity: Not Currently   Lifestyle     Physical activity     Days per week: Not on file     Minutes per session: Not on file     Stress: Not on file   Relationships     Social connections     Talks on phone: Not on file     Gets together: Not on file     Attends Lutheran service: Not on file     Active member of club or organization: Not on file     Attends meetings of clubs or organizations: Not on file     Relationship status: Not on file     Intimate partner violence     Fear of current or ex partner: Not on file     Emotionally abused: Not on file     Physically abused: Not on file     Forced sexual activity: Not on file   Other Topics Concern     Not on file   Social History Narrative     Not on file      Current Outpatient Medications   Medication Sig Dispense Refill     aspirin 81 mg chewable tablet Chew 1 tablet (81 mg total) 2 (two) times a day.  0     donepeziL (ARICEPT) 10 MG tablet Take 1 tablet (10 mg total) by mouth at bedtime. 90 tablet 2     gabapentin (NEURONTIN) 600 MG tablet Take 1 tablet (600 mg total) by mouth 2 (two) times a day. 180 tablet 2     polyvinyl  alcohol (LIQUIFILM TEARS) 1.4 % ophthalmic solution Apply 1 drop to eye as needed for dry eyes.       rosuvastatin (CRESTOR) 10 MG tablet Take 1 tablet (10 mg total) by mouth daily. 90 tablet 2     acetaminophen (TYLENOL EXTRA STRENGTH) 500 MG tablet Take 1 tablet (500 mg total) by mouth 2 (two) times a day. 100 tablet 2     calcium, as carbonate, (TUMS) 200 mg calcium (500 mg) chewable tablet Chew 1 tablet (200 mg total) 2 (two) times a day. 90 tablet 3     cholecalciferol, vitamin D3, (VITAMIN D3) 50 mcg (2,000 unit) Tab ONCE DAILY 90 tablet 0     escitalopram oxalate (LEXAPRO) 10 MG tablet Take 1 tablet (10 mg total) by mouth daily. 30 tablet 2     No current facility-administered medications for this visit.       Objective:   Vital Signs:   Visit Vitals  BP 98/62   Pulse 88   Temp 99.1  F (37.3  C) (Tympanic)   Wt 169 lb 4 oz (76.8 kg)   BMI 30.96 kg/m           VisionScreening:  No exam data present     PHYSICAL EXAM  Physical Examination: General appearance - alert, well appearing, and in no distress  Mouth - mucous membranes moist, pharynx normal without lesions  Neck - supple, no significant adenopathy  Lymphatics - no palpable lymphadenopathy, no hepatosplenomegaly  Chest - clear to auscultation, no wheezes, rales or rhonchi, symmetric air entry  Heart - normal rate, regular rhythm, normal S1, S2, no murmurs, rubs, clicks or gallops  Abdomen - soft, nontender, nondistended, no masses or organomegaly  Breasts - breasts appear normal, no suspicious masses, no skin or nipple changes or axillary nodes  Back exam - full range of motion, no tenderness, palpable spasm or pain on motion  Neurological - alert, oriented, normal speech, no focal findings or movement disorder noted  Musculoskeletal - no joint tenderness, deformity or swelling  Extremities - peripheral pulses normal, no pedal edema, no clubbing or cyanosis  Skin - normal coloration and turgor, no rashes, no suspicious skin lesions noted    Straight leg  negative   Normal hi[ ROM   Assessment Results 3/9/2021   Activities of Daily Living No help needed   Instrumental Activities of Daily Living 2-4 - Moderate impairment   Get Up and Go Score Less than 12 seconds   Mini Cog Total Score 0     A Mini-Cog score of 0-2 suggests the possibility of dementia, score of 3-5 suggests no dementia    Identified Health Risks:

## 2021-06-18 NOTE — PATIENT INSTRUCTIONS - HE
Patient Instructions by Ayse Cornejo MD at 3/9/2021 10:20 AM     Author: Ayse Cornejo MD Service: -- Author Type: Physician    Filed: 3/10/2021 12:21 AM Encounter Date: 3/9/2021 Status: Addendum    : Ayse Cornejo MD (Physician)    Related Notes: Original Note by Ayse Cornejo MD (Physician) filed at 3/9/2021 11:23 AM       https://vaccineconnector.[place].gov    Patient Education   Instrumental Activities of Daily Living  Your Health Risk Assessment indicates you have difficulties with instrumental activities of daily living which include laundry, housekeeping, banking, shopping, using the telephone, food preparation, transportation, or taking your own medications. Please make a follow up appointment for us to address this issue in more detail.    [employer] has resources available on the following website: [email or web address]     Also, here is a local agency that provides help with meals and other assistance:   [facility] Line: [phone]     Patient Education   Urinary Incontinence, Female (Adult)  Urinary incontinence means loss of control of the bladder. This problem affects many women, especially as they get older. If you have incontinence, you may be embarrassed to ask for help. But know that this problem can be treated.  Types of Incontinence  There are different types of incontinence. Two of the main types are described here. You can have more than one type.    Stress incontinence. With this type, urine leaks when pressure (stress) is put on the bladder. This may happen when you cough, sneeze, or laugh. Stress incontinence most often occurs because the pelvic floor muscles that support the bladder and urethra are weak. This can happen after pregnancy and vaginal childbirth or a hysterectomy. It can also be due to excess body weight or hormone changes.    Urge incontinence (also called overactive bladder). With this type, a sudden urge to urinate is  felt often. This may happen even though there may not be much urine in the bladder. The need to urinate often during the night is common. Urge incontinence most often occurs because of bladder spasms. This may be due to bladder irritation or infection. Damage to bladder nerves or pelvic muscles, constipation, and certain medicines can also lead to urge incontinence.  Treatment of urinary incontinence depends on the cause. Further evaluation is needed to find the type you have. This will likely include an exam and certain tests. Based on the results, you and your healthcare provider can then plan treatment. Until a diagnosis is made, the home care tips below can help relieve symptoms.  Home care    Do pelvic floor muscle exercises, if they are prescribed. The pelvic floor muscles help support the bladder and urethra. Many women find that their symptoms improve when doing special exercises that strengthen these muscles. To do the exercises contract the muscles you would use to stop your stream of urine, but do this when youre not urinating. Hold for 10 seconds, then relax. Repeat 10 to 20 times in a row, at least 3 times a day. Your provider may give you other instructions for how to do the exercises and how often.    Keep a bladder diary. This helps track how often and how much you urinate over a set period of time. Bring this diary with you to your next visit with the provider. The information can help your provider learn more about your bladder problem.    Lose weight, if advised to by your provider. Excess weight puts pressure on the bladder. Your provider can help you create a weight-loss plan thats right for you. This may include exercising more and making certain diet changes.    Don't consume foods and drinks that may irritate the bladder. These can include alcohol and caffeinated drinks.    Quit smoking. Smoking and other tobacco use can lead to chronic cough that strains the pelvic floor muscles. Smoking may  also damage the bladder and urethra. Talk with your provider about treatments or methods you can use to quit smoking.    If drinking large amounts of fluid causes you to have symptoms, you may be advised to limit your fluid intake. You may also be advised to drink most of your fluids during the day and to limit fluids at night.    If youre worried about urine leakage or accidents, you may wear absorbent pads to catch urine. Change the pads often. This helps reduce discomfort. It may also reduce the risk of skin or bladder infections.  Follow-up care  Follow up with your healthcare provider, or as directed. It may take some to find the right treatment for your problem. Your treatment plan may include special therapies or medicines. Certain procedures or surgery may also be options. Be sure to discuss any questions you have with your provider.  When to seek medical advice  Call the healthcare provider right away if any of these occur:    Fever of 100.4 F (38 C) or higher, or as directed by your provider    Bladder pain or fullness    Abdominal swelling    Nausea or vomiting    Back pain    Weakness, dizziness or fainting  Date Last Reviewed: 10/1/2017    4982-7291 [employer]. [address] [place], [place] [id number]. All rights reserved. This information is not intended as a substitute for professional medical care. Always follow your healthcare professional's instructions.     Patient Education   Preventing Falls in the Home  As you get older, falls are more likely. Thats because your reaction time slows. Your muscles and joints may also get stiffer, making them less flexible. Illness, medications, and vision changes can also affect your balance. A fall could leave you unable to live on your own. To make your home safer, follow these tips:    Floors    Put nonskid pads under area rugs.    Remove throw rugs.    Replace worn floor coverings.    Tack carpets firmly to each step on carpeted stairs. Put  nonskid strips on the edges of uncarpeted stairs.    Keep floors and stairs free of clutter and cords.    Arrange furniture so there are clear pathways.    Clean up any spills right away.    Bathrooms    Install grab bars in the tub or shower.    Apply nonskid strips or put a nonskid rubber mat in the tub or shower.    Sit on a bath chair to bathe.    Use bathmats with nonskid backing.    Lighting    Keep a flashlight in each room.    Put a nightlight along the pathway between the bedroom and the bathroom.    8467-3427 [employer]. [address] [place] [address]. All rights reserved. This information is not intended as a substitute for professional medical care. Always follow your healthcare professional's instructions.           Advance Directive  Patients advance directive was discussed and I am comfortable with the patients wishes.  Patient Education   Personalized Prevention Plan  You are due for the preventive services outlined below.  Your care team is available to assist you in scheduling these services.  If you have already completed any of these items, please share that information with your care team to update in your medical record.  Health Maintenance Due   Topic Date Due   ? Pneumococcal Vaccine: 65+ Years (1 of 1 - PPSV23) Never done   ? ZOSTER VACCINES (2 of 3) 02/15/2011   ? TD 18+ HE  12/21/2020   ? COVID-19 Vaccine (2 of 2 - Pfizer series) 03/12/2021   ? FALL RISK ASSESSMENT  03/23/2021

## 2021-06-19 NOTE — LETTER
Letter by Johnson, Michael Duane, CNP at      Author: Johnson, Michael Duane, CNP Service: -- Author Type: --    Filed:  Encounter Date: 12/5/2019 Status: Signed         Patient: Kat Up   MR Number: [id number]   YOB: 1935   Date of Visit: 12/5/2019     [facility]      Facility:    [facility] [[id number]]  Code Status: FULL CODE      Chief Complaint/Reason for Visit:  Chief Complaint   Patient presents with   ? H & P       HPI:   Kat is a 84 y.o. female who who I had the pleasure of visiting with secondary to admission into the assisted living facility third floor room 336.  She is residing with her .  They have recently moved into the facility.  They have had quite a good life.  They lived in all parts of the [place] they recently lived in [place] lived in [place] as well as spent some time in [place].  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  She does walk with a cane.  Has a history of chronic low back pain along with chronic right lower extremity pain along with neuropathy.  Seems to be pretty well controlled with the gabapentin.  Has a history of hypotension currently on Midrin.  Is not checking her blood pressures regularly and does not receive any other particular services in the [facility] so perhaps it would benefit her to get an automatic cuff to monitor her blood pressures.  Is being treated for dementia with Aricept and Namenda.  She is also on Crestor secondary to hyperlipidemia.  They state that she did have a cholesterol check about 2 months ago.  Once again there are no current records.  I did a chance to talk to staff including charge nurse and asked them to obtain those records.  Once again regarding her back she has had steroid injections in the past.  Also would like those records.  Right now does not need any injections but in the meantime we may want to check to see if who is in network so that if she needs an  injection she is able to get in and obtain another injection.  Otherwise she states she has been in normal health.  Appetite good.  No colds or flus or other problems.    Past Medical History:  Past Medical History:   Diagnosis Date   ? Dementia (H)    ? Hypotension    ? Neuropathy            Surgical History:  No past surgical history on file.    Family History:   No family history on file.    Social History:    Social History     Socioeconomic History   ? Marital status:      Spouse name: Not on file   ? Number of children: Not on file   ? Years of education: Not on file   ? Highest education level: Not on file   Occupational History   ? Not on file   Social Needs   ? Financial resource strain: Not on file   ? Food insecurity:     Worry: Not on file     Inability: Not on file   ? Transportation needs:     Medical: Not on file     Non-medical: Not on file   Tobacco Use   ? Smoking status: Never Smoker   ? Smokeless tobacco: Never Used   Substance and Sexual Activity   ? Alcohol use: Not on file   ? Drug use: Not on file   ? Sexual activity: Not on file   Lifestyle   ? Physical activity:     Days per week: Not on file     Minutes per session: Not on file   ? Stress: Not on file   Relationships   ? Social connections:     Talks on phone: Not on file     Gets together: Not on file     Attends Scientologist service: Not on file     Active member of club or organization: Not on file     Attends meetings of clubs or organizations: Not on file     Relationship status: Not on file   ? Intimate partner violence:     Fear of current or ex partner: Not on file     Emotionally abused: Not on file     Physically abused: Not on file     Forced sexual activity: Not on file   Other Topics Concern   ? Not on file   Social History Narrative   ? Not on file          Review of Systems  Currently she denies chills and fever coughing and wheezing chest pain dizziness or vertigo nausea vomiting diarrhea flulike symptoms unusual  myalgias arthralgias rashes or headaches.  There were no vitals filed for this visit.  Heart rate 78 respirations 20.  Physical Exam  Head is normocephalic.  Conjunctiva is pink sclerae clear.  Neck is supple without adenopathy.  Lung sounds are clear throughout.  Cardiovascular is normal without murmurs.  Gastrointestinal protuberant nontender positive bowel sounds.  Musculoskeletal able to ambulate with her cane.  Does have chronic low back pain as well as chronic right leg pain as well as right leg weakness.  Neuropathy.  No obvious tendon points to her major joints.  Psychiatric: Pleasant affect.  Medication List:  Current Outpatient Medications   Medication Sig   ? donepezil (ARICEPT) 10 MG tablet Take 10 mg by mouth at bedtime.   ? gabapentin (NEURONTIN) 600 MG tablet Take 600 mg by mouth 3 (three) times a day.   ? memantine (NAMENDA XR) 28 mg CSpX Take 28 mg by mouth daily.   ? midodrine (PROAMATINE) 10 MG tablet Take 10 mg by mouth 3 (three) times a day with meals.   ? rosuvastatin (CRESTOR) 10 MG tablet Take 10 mg by mouth at bedtime.       Labs:  No results found for: WBC, HGB, HCT, MCV, PLT      Assessment:    ICD-10-CM    1. Chronic bilateral low back pain without sciatica M54.5     G89.29    2. Chronic pain of right lower extremity M79.604     G89.29    3. Hypotension, unspecified hypotension type I95.9    4. Cognitive impairment R41.89    5. Neuropathy G62.9    6. Hyperlipidemia, unspecified hyperlipidemia type E78.5        Plan:  At this time I am asking them as well as the charge nurse and staff to obtain other old medical records so that we can go through them and make sure not missing anything.  In the meantime they certainly can call and try to get the records as well.  Perhaps get a automatic blood pressure cuff to see what her blood pressures are doing since that she is on Midodrin.  Also her cholesterol apparently was checked about 2 months ago.  They do have family in the area.  They are in  the assisted living facility but they will also be managing her own medications.  She did not have any other questions.    For documentation purposes total visit 45 minutes which over 50% was spent with the patient going over her history medications help her emotional activities discussion of services in the assisted living facility and coordination of care.      Electronically signed by: Michael Duane Johnson, CNP

## 2021-06-20 NOTE — LETTER
Letter by Johnson, Michael Duane, CNP at      Author: Johnson, Michael Duane, CNP Service: -- Author Type: --    Filed:  Encounter Date: 2/4/2020 Status: (Other)         Patient: Kat Up   MR Number: [id number]   YOB: 1935   Date of Visit: 2/4/2020     [facility]    Facility:   [facility] (formerly [facility] [[id number]]   Code Status: DNR      CHIEF COMPLAINT/REASON FOR VISIT:  Chief Complaint   Patient presents with   ? Follow Up     rehab, ankles       HISTORY:      HPI: Kat is a 84 y.o. female who had the opportunity to revisit with secondary to her hospitalization December 29 through January 1, 2020 secondary syncope and bilateral acute ankle fractures.  She did see orthopedics on January 29 she is doing well no pain decreased swelling continue nonweightbearing lower extremities continue to use ice may keep the boot or cam boot for range of motion exercises and follow-up again in about 4 weeks.  Therefore therapy is pretty limited at this time.  She has been normotensive and afebrile and also on room air.  Is on gabapentin for neuropathy and no pain issues.  Appetite is good.  She does have cognitive impairment. did need a chance to discuss her overall care her medications.  Denies any bowel or bladder issues.  She has been overall doing well did not have any particular complaints.    Past Medical History:   Diagnosis Date   ? Dementia (H)    ? Hypotension    ? Neuropathy              No family history on file.  Social History     Socioeconomic History   ? Marital status:      Spouse name: Not on file   ? Number of children: Not on file   ? Years of education: Not on file   ? Highest education level: Not on file   Occupational History   ? Not on file   Social Needs   ? Financial resource strain: Not on file   ? Food insecurity:     Worry: Not on file     Inability: Not on file   ? Transportation needs:     Medical: Not on file     Non-medical: Not  on file   Tobacco Use   ? Smoking status: Never Smoker   ? Smokeless tobacco: Never Used   Substance and Sexual Activity   ? Alcohol use: Not Currently   ? Drug use: Never   ? Sexual activity: Not Currently   Lifestyle   ? Physical activity:     Days per week: Not on file     Minutes per session: Not on file   ? Stress: Not on file   Relationships   ? Social connections:     Talks on phone: Not on file     Gets together: Not on file     Attends Jewish service: Not on file     Active member of club or organization: Not on file     Attends meetings of clubs or organizations: Not on file     Relationship status: Not on file   ? Intimate partner violence:     Fear of current or ex partner: Not on file     Emotionally abused: Not on file     Physically abused: Not on file     Forced sexual activity: Not on file   Other Topics Concern   ? Not on file   Social History Narrative   ? Not on file         Review of Systems  Currently she denies chills and fever coughing wheezing chest pain dizziness or vertigo nausea vomiting diarrhea dysuria flulike symptoms headache or stiff neck.  History of cognitive impairment hyperlipidemia and syncopal episodes and most recently bilateral ankle fracture secondary to a fall.  Neuropathic pain.    Current Outpatient Medications   Medication Sig   ? acetaminophen (TYLENOL) 500 MG tablet Take 1 tablet (500 mg total) by mouth every 6 (six) hours as needed for pain or fever.   ? aspirin 81 mg chewable tablet Chew 1 tablet (81 mg total) 2 (two) times a day.   ? bisacodyl (DULCOLAX) 10 mg suppository Insert suppository rectally daily as needed for treatment of constipation.   ? donepezil (ARICEPT) 10 MG tablet Take 10 mg by mouth at bedtime.   ? gabapentin (NEURONTIN) 600 MG tablet Take 700 mg by mouth 2 (two) times a day.    ? magnesium hydroxide (MILK OF MAG) 400 mg/5 mL Susp suspension Take 30 mL by mouth daily as needed.   ? polyethylene glycol (MIRALAX) 17 gram packet Take 1 packet (17  g total) by mouth daily as needed.   ? polyvinyl alcohol (LIQUIFILM TEARS) 1.4 % ophthalmic solution Apply 1 drop to eye as needed for dry eyes.   ? rosuvastatin (CRESTOR) 10 MG tablet Take 10 mg by mouth daily.        There were no vitals filed for this visit.  Blood pressure 102/61 pulse 64 respirations 16 temperature 98.1  Physical Exam  Head is normocephalic.     Lungs are clear throughout.  Cardiovascular is normal without murmurs.  No lower extremity edema.  Gastrointestinal nondistended.  Musculoskeletal moves upper extremities.  Bilateral lower extremities in Cam boots.  Positive CMS to her feet as well as popliteal pulses.  Psychiatric Pleasant affect.  Does have cognitive impairment.              LABS:   Lab Results   Component Value Date    WBC 7.5 12/30/2019    HGB 11.3 (L) 12/30/2019    HCT 39.1 12/29/2019    MCV 93 12/29/2019     12/29/2019         ASSESSMENT:      ICD-10-CM    1. Closed fracture of both ankles with routine healing, subsequent encounter S82.891D     S82.892D    2. Cognitive impairment R41.89    3. Hypotension, unspecified hypotension type I95.9    4. Neuropathy G62.9        PLAN:    No other new changes.  Once again did see orthopedics on the 29th still nonweightbearing and follow-up in about 4 weeks.  Once again just discussed her overall care the rehabilitation process her vital signs.    Electronically signed by: Michael Duane Johnson, CNP

## 2021-06-20 NOTE — LETTER
Letter by Johnson, Michael Duane, CNP at      Author: Johnson, Michael Duane, CNP Service: -- Author Type: --    Filed:  Encounter Date: 2/18/2020 Status: (Other)         Patient: Kat Up   MR Number: [id number]   YOB: 1935   Date of Visit: 2/18/2020     [facility]    Facility:   [facility] [[id number]]   Code Status: DNR/DNI      CHIEF COMPLAINT/REASON FOR VISIT:  Chief Complaint   Patient presents with   ? Follow Up     rehab, ankles. htn       HISTORY:      HPI: Kat is a 84 y.o. female who I had the opportunity to revisit with secondary to her hospitalization December 29 through January 1, 2020 secondary syncope and did sustain bilateral ankle fractures.  Her ankle fractures are nonsurgical.  Does have a visit with orthopedics on February 24.  Does have cam boots.  Still nonweightbearing.  Recently decreased the Neurontin 600 mg twice daily rather than 700 mg twice daily no pain or neuropathy.  Not having any Tylenol as needed.  Recently did do the cholesterol check and see results below.  She does have cognitive impairment is on Aricept.  Her appetite is good.  Always a pleasure to visit with.  Does have a lot of noise cards on her wall as well as a new baseball of flowers.  She did not have anything really else to add to the conversation and she states that she is overall doing pretty well.    Past Medical History:   Diagnosis Date   ? Dementia (H)    ? Hypotension    ? Neuropathy              No family history on file.  Social History     Socioeconomic History   ? Marital status:      Spouse name: Not on file   ? Number of children: Not on file   ? Years of education: Not on file   ? Highest education level: Not on file   Occupational History   ? Not on file   Social Needs   ? Financial resource strain: Not on file   ? Food insecurity:     Worry: Not on file     Inability: Not on file   ? Transportation needs:     Medical: Not  on file     Non-medical: Not on file   Tobacco Use   ? Smoking status: Never Smoker   ? Smokeless tobacco: Never Used   Substance and Sexual Activity   ? Alcohol use: Not Currently   ? Drug use: Never   ? Sexual activity: Not Currently   Lifestyle   ? Physical activity:     Days per week: Not on file     Minutes per session: Not on file   ? Stress: Not on file   Relationships   ? Social connections:     Talks on phone: Not on file     Gets together: Not on file     Attends Mosque service: Not on file     Active member of club or organization: Not on file     Attends meetings of clubs or organizations: Not on file     Relationship status: Not on file   ? Intimate partner violence:     Fear of current or ex partner: Not on file     Emotionally abused: Not on file     Physically abused: Not on file     Forced sexual activity: Not on file   Other Topics Concern   ? Not on file   Social History Narrative   ? Not on file         Review of Systems  Currently she denies chills and fever coughing wheezing chest pain dizziness or vertigo nausea vomiting diarrhea dysuria flulike symptoms headache or stiff neck.  History of cognitive impairment hyperlipidemia and syncopal episodes and most recently bilateral ankle fracture secondary to a fall.  Neuropathic pain.      Current Outpatient Medications:   ?  acetaminophen (TYLENOL) 500 MG tablet, Take 1 tablet (500 mg total) by mouth every 6 (six) hours as needed for pain or fever., Disp: , Rfl: 0  ?  aspirin 81 mg chewable tablet, Chew 1 tablet (81 mg total) 2 (two) times a day., Disp: , Rfl: 0  ?  bisacodyl (DULCOLAX) 10 mg suppository, Insert suppository rectally daily as needed for treatment of constipation., Disp: , Rfl: 0  ?  donepezil (ARICEPT) 10 MG tablet, Take 10 mg by mouth at bedtime., Disp: , Rfl:   ?  gabapentin (NEURONTIN) 600 MG tablet, Take 600 mg by mouth 2 (two) times a day. , Disp: , Rfl:   ?  magnesium hydroxide (MILK OF MAG) 400 mg/5 mL Susp suspension, Take  30 mL by mouth daily as needed., Disp: , Rfl: 0  ?  polyethylene glycol (MIRALAX) 17 gram packet, Take 1 packet (17 g total) by mouth daily as needed., Disp: , Rfl: 0  ?  polyvinyl alcohol (LIQUIFILM TEARS) 1.4 % ophthalmic solution, Apply 1 drop to eye as needed for dry eyes., Disp: , Rfl:   ?  rosuvastatin (CRESTOR) 10 MG tablet, Take 10 mg by mouth daily. , Disp: , Rfl:     There were no vitals filed for this visit.  Blood pressure 102/66 pulse 72 respirations 18 temperature 98.8  Physical Exam  Head is normocephalic.     Lungs are clear throughout.  Cardiovascular is normal without murmurs.  No lower extremity edema.  Gastrointestinal nondistended.  Musculoskeletal moves upper extremities.  Bilateral lower extremities in Cam boots.  Positive CMS to her feet as well as popliteal pulses.  Psychiatric Pleasant affect.  Does have cognitive impairment.         LABS:   Lab Results   Component Value Date    WBC 7.5 12/30/2019    HGB 11.3 (L) 12/30/2019    HCT 39.1 12/29/2019    MCV 93 12/29/2019     12/29/2019     Lab Results   Component Value Date    CHOL 132 02/12/2020     Lab Results   Component Value Date    HDL 39 (L) 02/12/2020     Lab Results   Component Value Date    LDLCALC 56 02/12/2020     Lab Results   Component Value Date    TRIG 185 (H) 02/12/2020     No components found for: CHOLHDL      ASSESSMENT:      ICD-10-CM    1. Hypotension, unspecified hypotension type I95.9    2. Cognitive impairment R41.89    3. Closed fracture of both ankles with routine healing, subsequent encounter S82.891D     S82.892D    4. Vasovagal syncope R55        PLAN:    No new changes.  Continue to manage and follow blood pressures look good she has good pain control.  No neuropathy.  Appetite good.  Does see orthopedics on the 24th.      Electronically signed by: Michael Duane Johnson, CNP

## 2021-06-20 NOTE — LETTER
Letter by Johnson, Michael Duane, CNP at      Author: Johnson, Michael Duane, CNP Service: -- Author Type: --    Filed:  Encounter Date: 3/12/2020 Status: (Other)         Patient: Kat Up   MR Number: [id number]   YOB: 1935   Date of Visit: 3/12/2020     [facility]    Facility:   [facility] [[id number]]   Code Status: DNR      CHIEF COMPLAINT/REASON FOR VISIT:  Chief Complaint   Patient presents with   ? Follow Up     rehab, ankles, co, htn       HISTORY:      HPI: Kat is a 85 y.o. female who I had the chance to revisit with secondary to her hospitalization December 29, 2019 through January 1, 2020 secondary to bilateral ankle fractures.  She has seen orthopedics and now have given her full range of ambulation as tolerated.  She has been doing quite well no falls or other problems.  Her pain is well in check she does have a history of neuropathy currently on gabapentin.  She has been normotensive and afebrile and also on room air.  Appetite is good.  Denies any bowel or bladder problems.  Looks like there is a potential discharge for next week and then after that discharge she will be actually going to her daughter's home.    Past Medical History:   Diagnosis Date   ? Dementia (H)    ? Hypotension    ? Neuropathy              No family history on file.  Social History     Socioeconomic History   ? Marital status:      Spouse name: Not on file   ? Number of children: Not on file   ? Years of education: Not on file   ? Highest education level: Not on file   Occupational History   ? Not on file   Social Needs   ? Financial resource strain: Not on file   ? Food insecurity     Worry: Not on file     Inability: Not on file   ? Transportation needs     Medical: Not on file     Non-medical: Not on file   Tobacco Use   ? Smoking status: Never Smoker   ? Smokeless tobacco: Never Used   Substance and Sexual Activity   ? Alcohol use: Not  Currently   ? Drug use: Never   ? Sexual activity: Not Currently   Lifestyle   ? Physical activity     Days per week: Not on file     Minutes per session: Not on file   ? Stress: Not on file   Relationships   ? Social connections     Talks on phone: Not on file     Gets together: Not on file     Attends Yazdanism service: Not on file     Active member of club or organization: Not on file     Attends meetings of clubs or organizations: Not on file     Relationship status: Not on file   ? Intimate partner violence     Fear of current or ex partner: Not on file     Emotionally abused: Not on file     Physically abused: Not on file     Forced sexual activity: Not on file   Other Topics Concern   ? Not on file   Social History Narrative   ? Not on file         Review of Systems  Currently she denies chills and fever coughing wheezing chest pain dizziness or vertigo nausea vomiting diarrhea dysuria flulike symptoms headache or stiff neck.  History of cognitive impairment hyperlipidemia and syncopal episodes and most recently bilateral ankle fracture secondary to a fall.  Neuropathic pain.    Current Outpatient Medications:   ?  acetaminophen (TYLENOL) 500 MG tablet, Take 1 tablet (500 mg total) by mouth every 6 (six) hours as needed for pain or fever., Disp: , Rfl: 0  ?  aspirin 81 mg chewable tablet, Chew 1 tablet (81 mg total) 2 (two) times a day., Disp: , Rfl: 0  ?  bisacodyl (DULCOLAX) 10 mg suppository, Insert suppository rectally daily as needed for treatment of constipation., Disp: , Rfl: 0  ?  donepezil (ARICEPT) 10 MG tablet, Take 10 mg by mouth at bedtime., Disp: , Rfl:   ?  gabapentin (NEURONTIN) 600 MG tablet, Take 600 mg by mouth 2 (two) times a day. , Disp: , Rfl:   ?  magnesium hydroxide (MILK OF MAG) 400 mg/5 mL Susp suspension, Take 30 mL by mouth daily as needed., Disp: , Rfl: 0  ?  polyethylene glycol (MIRALAX) 17 gram packet, Take 1 packet (17 g total) by mouth daily as needed., Disp: , Rfl: 0  ?   polyvinyl alcohol (LIQUIFILM TEARS) 1.4 % ophthalmic solution, Apply 1 drop to eye as needed for dry eyes., Disp: , Rfl:   ?  rosuvastatin (CRESTOR) 10 MG tablet, Take 10 mg by mouth daily. , Disp: , Rfl:     There were no vitals filed for this visit.  Blood pressure 116/74 pulse 88 respirations 18 temperature 97.1 saturation room air 96%  Physical Exam  Head is normocephalic.     Lungs are clear throughout.  Cardiovascular is normal without murmurs.   Gastrointestinal nondistended.  Musculoskeletal ;denies discomfort.  Bilateral lower extremities in Cam boots.  Positive CMS to her feet as well as popliteal pulses.  Psychiatric Pleasant affect.  Does have cognitive impairment.       LABS:   Lab Results   Component Value Date    CHOL 132 02/12/2020     Lab Results   Component Value Date    HDL 39 (L) 02/12/2020     Lab Results   Component Value Date    LDLCALC 56 02/12/2020     Lab Results   Component Value Date    TRIG 185 (H) 02/12/2020     No components found for: CHOLHDL      ASSESSMENT:      ICD-10-CM    1. Neuropathy  G62.9    2. Hypotension, unspecified hypotension type  I95.9    3. Cognitive impairment  R41.89    4. Closed fracture of both ankles with routine healing, subsequent encounter  S82.891D     S82.892D        PLAN:    Looks like there is a potential for discharge next week some time.  Should be going to her daughter's home rather than the [facility].  Her  also now lives at her daughter's home which they both used to live at the assisted living facility.  Otherwise she did not have any other questions or concerns she is feeling overall.      Electronically signed by: Michael Duane Johnson, CNP

## 2021-06-20 NOTE — LETTER
Letter by Sylvia Dickey CNP at      Author: Sylvia Dickey CNP Service: -- Author Type: --    Filed:  Encounter Date: 3/17/2020 Status: (Other)         Patient: Kat Up   MR Number: [id number]   YOB: 1935   Date of Visit: 3/17/2020     [facility]    Facility:   [facility] [[id number]]   Code Status: POLST AVAILABLE  PCP: Ayse Cornejo MD   Phone: [phone]   Fax: [phone]      CHIEF COMPLAINT/REASON FOR VISIT:  Chief Complaint   Patient presents with   ? Discharge Summary       HISTORY COURSE:    HPI: Kat is a 85 y.o. female who I had the chance to revisit with secondary to her hospitalization December 29, 2019 through January 1, 2020 secondary to bilateral ankle fractures.  She has seen orthopedics and now have given her full range of ambulation as tolerated.  She has been doing quite well no falls or other problems.  Her pain is well in check she does have a history of neuropathy currently on gabapentin.  She has been normotensive and afebrile and also on room air.  Appetite is good.  Denies any bowel or bladder problems.      Today Ms. Up is being evaluated for a review of multiple medical conditions prior to TCU discharge.  She denied any concerns at this time and is looking forward to going home with her daughter today.  The patient denied lightheadedness, dizziness, breathing difficulty, chest pain, palpitations, constipation, urinary symptoms, numbness or tingling in extremities, and pain.     PHYSICAL EXAM:     General Appearance:    Alert, cooperative, no distress, appears stated age   Head:    Normocephalic, without obvious abnormality, atraumatic   Eyes:    PERRL, conjunctiva/corneas clear, both eyes   Ears:    Normal external ear canals, both ears   Nose:   Nares normal, septum midline, mucosa normal, no drainage    or sinus tenderness   Throat:   Lips, mucosa, and tongue normal; teeth and gums  normal   Neck:   Supple, symmetrical, trachea midline, no adenopathy;     thyroid:  no enlargement/tenderness/nodules; no carotid    bruit or JVD   Back:     Symmetric, no curvature, ROM normal, no CVA tenderness   Lungs:     Clear to auscultation bilaterally, respirations unlabored   Chest Wall:    No tenderness or deformity    Heart:    Regular rate and rhythm, S1 and S2 normal, no murmur, rub   or gallop   Abdomen:     Soft, non-tender, bowel sounds active all four quadrants,     no masses, no organomegaly   Extremities:   Extremities normal, atraumatic, no cyanosis or edema   Pulses:   2+ and symmetric all extremities   Skin:   Skin color, texture, turgor normal, no rashes or lesions   Neurologic:   Oriented to person, place, time, and situation; exhibits logical thought processes; generalized weakness       MEDICATION LIST:  Current Outpatient Medications   Medication Sig   ? aspirin 81 mg chewable tablet Chew 1 tablet (81 mg total) 2 (two) times a day.   ? donepeziL (ARICEPT) 10 MG tablet Take 1 tablet (10 mg total) by mouth at bedtime.   ? gabapentin (NEURONTIN) 600 MG tablet Take 1 tablet (600 mg total) by mouth 2 (two) times a day.   ? polyvinyl alcohol (LIQUIFILM TEARS) 1.4 % ophthalmic solution Apply 1 drop to eye as needed for dry eyes.   ? rosuvastatin (CRESTOR) 10 MG tablet Take 1 tablet (10 mg total) by mouth daily.       DISCHARGE DIAGNOSIS:    ICD-10-CM    1. Closed fracture of both ankles with routine healing, subsequent encounter  S82.891D     S82.892D    2. Neuropathy  G62.9    3. Cognitive impairment  R41.89    4. Chronic bilateral low back pain without sciatica  M54.5     G89.29      Continue Tylenol and gabapentin for pain managemet s/p bilateral ankle fractures.  Otherwise continue current care plan for all other chronic medical conditions, as they are stable. Encouraged patient to engage in healthy lifestyle behaviors such as engaging in social activities, exercising (PT/OT), eating well,  and following care plan. Follow up for routine check-up, or sooner if needed. Will continue to monitor patient and work with nursing staff collaboratively to work toward positive patient outcomes.     MEDICAL EQUIPMENT NEEDS:  The patient has mobility limitation significantly impairing her ability to participate in mobility-related activities of daily living, such as toileting, feeding, dressing, grooming, and bathing in customary locations in the home. The mobility limitation cannot be sufficiently and safely resolved by use of an appropriately fitted walker. The patient or caregiver is able to safely use a walker. The patient's functional mobility deficit can be sufficiently resolved by the use of a walker. The patient has mobility limitations that impairs her ability to perform ADLs without use of a walker to be mobile in the home.      DISCHARGE PLAN/FACE TO FACE:  I certify that services are/were furnished while this patient was under the care of a physician and that a physician or an allowed non-physician practitioner (NPP), had a face-to-face encounter that meets the physician face-to-face encounter requirements. The encounter was in whole, or in part, related to the primary reason for home health. The patient is confined to her home and needs intermittent skilled nursing, physical therapy, speech-language pathology, or the continued need for occupational therapy. A plan of care has been established by a physician and is periodically reviewed by a physician.  Date of Face-to-Face Encounter: 3/17/20    I certify that, based on my findings, the following services are medically necessary home health services: home health aid for bathing and ADLs, skilled nursing (RN) for medication set-up and teaching, symptoms and disease process monitoring and education, PT for strengthening, balance, endurance and safety within the home, OT for strengthening, ADL needs, adaptive equipment and safety.     My clinical findings  support the need for the above skilled services because: (Please write a brief narrative summary that describes what the RN, PT, SLP, or other services will be doing in the home. A list of diagnoses in this section does not meet the CMS requirements.) home health aid for bathing and ADLs, skilled nursing (RN) for medication set-up and teaching, symptoms and disease process monitoring and education, PT for strengthening, balance, endurance and safety within the home, OT for strengthening, ADL needs, adaptive equipment and safety.     This patient is homebound because: (Please write a brief narrative summary describing the functional limitations as to why this patient is homebound and specifically what makes this patient homebound.) she is a frail elderly woman with multiple comorbidities and recent hospitalizations making it unsafe for her to go out into the community for services.     The patient is, or has been, under my care and I have initiated the establishment of the plan of care. This patient will be followed by a physician who will periodically review the plan of care.    Schedule follow up visit with primary care provider within 7 days to reestablish care.    Electronically signed by: Sylvia Dickey, CNP

## 2021-06-20 NOTE — LETTER
Letter by Johnson, Michael Duane, CNP at      Author: Johnson, Michael Duane, CNP Service: -- Author Type: --    Filed:  Encounter Date: 1/14/2020 Status: Signed         Patient: Kat Up   MR Number: [id number]   YOB: 1935   Date of Visit: 1/14/2020     [facility]    Facility:   [facility] [[id number]]   Code Status: DNR      CHIEF COMPLAINT/REASON FOR VISIT:  Chief Complaint   Patient presents with   ? Follow Up     rehab, ankles.       HISTORY:      HPI: Kat is a 84 y.o. female who is still in the transitional care unit secondary to her hospitalization December 29, 2019 through January 1, 2020 secondary to syncope falls and acute bilateral ankle fractures.  For her neuropathic pain she is on gabapentin 700 mg twice daily.  There is no reports of any pain and only one Tylenol on January 7 was given as needed.  No bowel or bladder problems.  She also has hypotension recently made some adjustments to her Midrin now at 5 mg twice daily most of the systolics are ranging 110-120 and has been asymptomatic.  Regarding her ankles good CMS she does have a cam boots on was supposed to see orthopedics on 13th has not been changed to the 15th.  Appetite good.  Did not have any other questions.  She does have cognitive impairment.    Past Medical History:   Diagnosis Date   ? Dementia (H)    ? Hypotension    ? Neuropathy              No family history on file.  Social History     Socioeconomic History   ? Marital status:      Spouse name: Not on file   ? Number of children: Not on file   ? Years of education: Not on file   ? Highest education level: Not on file   Occupational History   ? Not on file   Social Needs   ? Financial resource strain: Not on file   ? Food insecurity:     Worry: Not on file     Inability: Not on file   ? Transportation needs:     Medical: Not on file     Non-medical: Not on file   Tobacco Use   ? Smoking status: Never  Smoker   ? Smokeless tobacco: Never Used   Substance and Sexual Activity   ? Alcohol use: Not Currently   ? Drug use: Never   ? Sexual activity: Not Currently   Lifestyle   ? Physical activity:     Days per week: Not on file     Minutes per session: Not on file   ? Stress: Not on file   Relationships   ? Social connections:     Talks on phone: Not on file     Gets together: Not on file     Attends [employer] service: Not on file     Active member of club or organization: Not on file     Attends meetings of clubs or organizations: Not on file     Relationship status: Not on file   ? Intimate partner violence:     Fear of current or ex partner: Not on file     Emotionally abused: Not on file     Physically abused: Not on file     Forced sexual activity: Not on file   Other Topics Concern   ? Not on file   Social History Narrative   ? Not on file         Review of Systems  Currently she denies chills and fever coughing wheezing chest pain dizziness or vertigo nausea vomiting diarrhea dysuria flulike symptoms headache or stiff neck.  History of cognitive impairment hyperlipidemia and syncopal episodes and most recently bilateral ankle fracture secondary to a fall.  Neuropathic pain.  Current Outpatient Medications   Medication Sig   ? acetaminophen (TYLENOL) 500 MG tablet Take 1 tablet (500 mg total) by mouth every 6 (six) hours as needed for pain or fever.   ? aspirin 81 mg chewable tablet Chew 1 tablet (81 mg total) 2 (two) times a day.   ? bisacodyl (DULCOLAX) 10 mg suppository Insert suppository rectally daily as needed for treatment of constipation.   ? donepezil (ARICEPT) 10 MG tablet Take 10 mg by mouth at bedtime.   ? gabapentin (NEURONTIN) 600 MG tablet Take 700 mg by mouth 2 (two) times a day.    ? magnesium hydroxide (MILK OF MAG) 400 mg/5 mL Susp suspension Take 30 mL by mouth daily as needed.   ? midodrine (PROAMATINE) 10 MG tablet Take 5 mg by mouth 3 (three) times a day with meals.    ? polyethylene glycol  (MIRALAX) 17 gram packet Take 1 packet (17 g total) by mouth daily as needed.   ? polyvinyl alcohol (LIQUIFILM TEARS) 1.4 % ophthalmic solution Apply 1 drop to eye as needed for dry eyes.   ? rosuvastatin (CRESTOR) 10 MG tablet Take 10 mg by mouth daily.        There were no vitals filed for this visit.  Blood pressure 112/72 pulse 70 respirations 18 temperature 98.3  Physical Exam  Head is normocephalic.    Neck is supple without adenopathy.  Lungs are clear throughout.  Cardiovascular is normal without murmurs.  No lower extremity edema.  Gastrointestinal nondistended.  Musculoskeletal moves upper extremities.  Bilateral lower extremities in Cam boots.  Positive CMS to her feet as well as popliteal pulses.  Psychiatric Pleasant affect.  Does have cognitive impairment.       LABS:   Lab Results   Component Value Date    WBC 7.5 12/30/2019    HGB 11.3 (L) 12/30/2019    HCT 39.1 12/29/2019    MCV 93 12/29/2019     12/29/2019         ASSESSMENT:      ICD-10-CM    1. Hypotension, unspecified hypotension type I95.9    2. Neuropathy G62.9    3. Closed fracture of both ankles with routine healing, subsequent encounter S82.891D     S82.892D    4. Cognitive impairment R41.89        PLAN:    No further changes at this time.  Does see orthopedics on January 15.  Continue to manage and follow.        Electronically signed by: Michael Duane Johnson, CNP

## 2021-06-20 NOTE — LETTER
Letter by Johnson, Michael Duane, CNP at      Author: Johnson, Michael Duane, CNP Service: -- Author Type: --    Filed:  Encounter Date: 2/13/2020 Status: (Other)         Patient: Kat Up   MR Number: [id number]   YOB: 1935   Date of Visit: 2/13/2020     [facility]    Facility:   [facility] [[id number]]   Code Status: DNR      CHIEF COMPLAINT/REASON FOR VISIT:  Chief Complaint   Patient presents with   ? Follow Up     rehab, ankles.       HISTORY:      HPI: Kat is a 84 y.o. female who is still in the transitional care unit secondary to her hospitalization December 29 through January 1, 2020 secondary syncope and did sustain bilateral ankle fractures.  Nonsurgical.  She is currently on lovastatin 10 mg.  Has not had any recent cholesterol see results below but the cholesterol on February 12 total was 132 LDL of 56 triglycerides 185 HDL 39.  She has not had any orthostatic issues currently not on any other blood pressure medications.  Not having any significant discomfort no Tylenol as needed.  Also recently reduced Neurontin 600 mg twice a day and is been no reports of any issues.  Appetite good.  Still nonweightbearing follow-up with orthopedics again on February 24.  Does have cognitive impairment and is on Aricept.    Past Medical History:   Diagnosis Date   ? Dementia (H)    ? Hypotension    ? Neuropathy              No family history on file.  Social History     Socioeconomic History   ? Marital status:      Spouse name: Not on file   ? Number of children: Not on file   ? Years of education: Not on file   ? Highest education level: Not on file   Occupational History   ? Not on file   Social Needs   ? Financial resource strain: Not on file   ? Food insecurity:     Worry: Not on file     Inability: Not on file   ? Transportation needs:     Medical: Not on file     Non-medical: Not on file   Tobacco Use   ? Smoking status: Never  Smoker   ? Smokeless tobacco: Never Used   Substance and Sexual Activity   ? Alcohol use: Not Currently   ? Drug use: Never   ? Sexual activity: Not Currently   Lifestyle   ? Physical activity:     Days per week: Not on file     Minutes per session: Not on file   ? Stress: Not on file   Relationships   ? Social connections:     Talks on phone: Not on file     Gets together: Not on file     Attends Hoahaoism service: Not on file     Active member of club or organization: Not on file     Attends meetings of clubs or organizations: Not on file     Relationship status: Not on file   ? Intimate partner violence:     Fear of current or ex partner: Not on file     Emotionally abused: Not on file     Physically abused: Not on file     Forced sexual activity: Not on file   Other Topics Concern   ? Not on file   Social History Narrative   ? Not on file         Review of Systems  Currently she denies chills and fever coughing wheezing chest pain dizziness or vertigo nausea vomiting diarrhea dysuria flulike symptoms headache or stiff neck.  History of cognitive impairment hyperlipidemia and syncopal episodes and most recently bilateral ankle fracture secondary to a fall.  Neuropathic pain.      Current Outpatient Medications:   ?  acetaminophen (TYLENOL) 500 MG tablet, Take 1 tablet (500 mg total) by mouth every 6 (six) hours as needed for pain or fever., Disp: , Rfl: 0  ?  aspirin 81 mg chewable tablet, Chew 1 tablet (81 mg total) 2 (two) times a day., Disp: , Rfl: 0  ?  bisacodyl (DULCOLAX) 10 mg suppository, Insert suppository rectally daily as needed for treatment of constipation., Disp: , Rfl: 0  ?  donepezil (ARICEPT) 10 MG tablet, Take 10 mg by mouth at bedtime., Disp: , Rfl:   ?  gabapentin (NEURONTIN) 600 MG tablet, Take 600 mg by mouth 2 (two) times a day. , Disp: , Rfl:   ?  magnesium hydroxide (MILK OF MAG) 400 mg/5 mL Susp suspension, Take 30 mL by mouth daily as needed., Disp: , Rfl: 0  ?  polyethylene glycol  (MIRALAX) 17 gram packet, Take 1 packet (17 g total) by mouth daily as needed., Disp: , Rfl: 0  ?  polyvinyl alcohol (LIQUIFILM TEARS) 1.4 % ophthalmic solution, Apply 1 drop to eye as needed for dry eyes., Disp: , Rfl:   ?  rosuvastatin (CRESTOR) 10 MG tablet, Take 10 mg by mouth daily. , Disp: , Rfl:     There were no vitals filed for this visit.  Blood pressure 104/65 pulse 87 respirations 18 saturation room air 92%  Physical Exam  Head is normocephalic.     Lungs are clear throughout.  Cardiovascular is normal without murmurs.  No lower extremity edema.  Gastrointestinal nondistended.  Musculoskeletal moves upper extremities.  Bilateral lower extremities in Cam boots.  Positive CMS to her feet as well as popliteal pulses.  Psychiatric Pleasant affect.  Does have cognitive impairment.       LABS:   Lab Results   Component Value Date    WBC 7.5 12/30/2019    HGB 11.3 (L) 12/30/2019    HCT 39.1 12/29/2019    MCV 93 12/29/2019     12/29/2019     Lab Results   Component Value Date    CHOL 132 02/12/2020     Lab Results   Component Value Date    HDL 39 (L) 02/12/2020     Lab Results   Component Value Date    LDLCALC 56 02/12/2020     Lab Results   Component Value Date    TRIG 185 (H) 02/12/2020     No components found for: CHOLHDL      ASSESSMENT:      ICD-10-CM    1. Cognitive impairment R41.89    2. Closed fracture of both ankles with routine healing, subsequent encounter S82.891D     S82.892D    3. Neuropathy G62.9    4. Vasovagal syncope R55        PLAN:    Still nonweightbearing but can do some active range of motion.  Next orthopedic visit is February 24.  The cholesterol looks pretty good there is been no changes in pain since decreasing gabapentin 600 mg twice daily.       Electronically signed by: Michael Duane Johnson, CNP

## 2021-06-20 NOTE — LETTER
Letter by Johnson, Michael Duane, CNP at      Author: Johnson, Michael Duane, CNP Service: -- Author Type: --    Filed:  Encounter Date: 1/30/2020 Status: (Other)         Patient: Kat Up   MR Number: [id number]   YOB: 1935   Date of Visit: 1/30/2020     [facility]    Facility:   [facility] [[id number]]   Code Status: DNR      CHIEF COMPLAINT/REASON FOR VISIT:  Chief Complaint   Patient presents with   ? Follow Up     rehab, ankles.       HISTORY:      HPI: Kat is a 84 y.o. female was still in the transitional care unit secondary to her hospitalization December 29 through January 1, 2020 secondary syncope as well as bilateral acute ankle fractures.  Not having any discomfort.  Did see orthopedics yesterday I do not have any other notes I do not know what kind of program that she is supposed to be on at this time.  According to her therapy note she is decreased therapy to 1 time a day C needs slide board transfers as well as cues and working on a toileting program.  Otherwise she has been normotensive and afebrile and also on room air.  No complaints of appetite changes belly problems flulike symptoms.  No neuropathy is on gabapentin.  Pleasant visit once again.    Past Medical History:   Diagnosis Date   ? Dementia (H)    ? Hypotension    ? Neuropathy              No family history on file.  Social History     Socioeconomic History   ? Marital status:      Spouse name: Not on file   ? Number of children: Not on file   ? Years of education: Not on file   ? Highest education level: Not on file   Occupational History   ? Not on file   Social Needs   ? Financial resource strain: Not on file   ? Food insecurity:     Worry: Not on file     Inability: Not on file   ? Transportation needs:     Medical: Not on file     Non-medical: Not on file   Tobacco Use   ? Smoking status: Never Smoker   ? Smokeless tobacco: Never Used   Substance and  Sexual Activity   ? Alcohol use: Not Currently   ? Drug use: Never   ? Sexual activity: Not Currently   Lifestyle   ? Physical activity:     Days per week: Not on file     Minutes per session: Not on file   ? Stress: Not on file   Relationships   ? Social connections:     Talks on phone: Not on file     Gets together: Not on file     Attends [employer] service: Not on file     Active member of club or organization: Not on file     Attends meetings of clubs or organizations: Not on file     Relationship status: Not on file   ? Intimate partner violence:     Fear of current or ex partner: Not on file     Emotionally abused: Not on file     Physically abused: Not on file     Forced sexual activity: Not on file   Other Topics Concern   ? Not on file   Social History Narrative   ? Not on file         Review of Systems  Currently she denies chills and fever coughing wheezing chest pain dizziness or vertigo nausea vomiting diarrhea dysuria flulike symptoms headache or stiff neck.  History of cognitive impairment hyperlipidemia and syncopal episodes and most recently bilateral ankle fracture secondary to a fall.  Neuropathic pain.  Current Outpatient Medications   Medication Sig   ? acetaminophen (TYLENOL) 500 MG tablet Take 1 tablet (500 mg total) by mouth every 6 (six) hours as needed for pain or fever.   ? aspirin 81 mg chewable tablet Chew 1 tablet (81 mg total) 2 (two) times a day.   ? bisacodyl (DULCOLAX) 10 mg suppository Insert suppository rectally daily as needed for treatment of constipation.   ? donepezil (ARICEPT) 10 MG tablet Take 10 mg by mouth at bedtime.   ? gabapentin (NEURONTIN) 600 MG tablet Take 700 mg by mouth 2 (two) times a day.    ? magnesium hydroxide (MILK OF MAG) 400 mg/5 mL Susp suspension Take 30 mL by mouth daily as needed.   ? polyethylene glycol (MIRALAX) 17 gram packet Take 1 packet (17 g total) by mouth daily as needed.   ? polyvinyl alcohol (LIQUIFILM TEARS) 1.4 % ophthalmic solution Apply  1 drop to eye as needed for dry eyes.   ? rosuvastatin (CRESTOR) 10 MG tablet Take 10 mg by mouth daily.        There were no vitals filed for this visit.  Blood pressure 113/70 pulse 64 respirations 16 temperature 97.7 saturation room air 96%  Physical Exam  Head is normocephalic.     Lungs are clear throughout.  Cardiovascular is normal without murmurs.  No lower extremity edema.  Gastrointestinal nondistended.  Musculoskeletal moves upper extremities.  Bilateral lower extremities in Cam boots.  Positive CMS to her feet as well as popliteal pulses.  Psychiatric Pleasant affect.  Does have cognitive impairment.            LABS:   Lab Results   Component Value Date    WBC 7.5 12/30/2019    HGB 11.3 (L) 12/30/2019    HCT 39.1 12/29/2019    MCV 93 12/29/2019     12/29/2019         ASSESSMENT:      ICD-10-CM    1. Neuropathy G62.9    2. Closed fracture of both ankles with routine healing, subsequent encounter S82.891D     S82.892D    3. Hypotension, unspecified hypotension type I95.9    4. Cognitive impairment R41.89        PLAN:    Exam wise she is doing fine.  She does have some cognitive impairment otherwise very pleasant visit today.  Continue to monitor her blood pressures and overall status as well as with therapy is trying to perform for her.  I am not sure at this point she is truly transitional care and appropriate perhaps going to long-term care until she is able to perform more therapy recommendations.      Electronically signed by: Michael Duane Johnson, LUCRECIA

## 2021-06-20 NOTE — LETTER
Letter by Johnson, Michael Duane, CNP at      Author: Johnson, Michael Duane, CNP Service: -- Author Type: --    Filed:  Encounter Date: 1/28/2020 Status: (Other)         Patient: Kat Up   MR Number: [id number]   YOB: 1935   Date of Visit: 1/28/2020     [facility]    Facility:   [facility] [[id number]]   Code Status: DNR      CHIEF COMPLAINT/REASON FOR VISIT:  Chief Complaint   Patient presents with   ? Follow Up     rehab, ankles.       HISTORY:      HPI: Kat is a 84 y.o. female who I had the pleasure revisiting with secondary to her hospitalization December 29 through January 1, 2020 secondary syncope and acute bilateral ankle fractures.  Her ankle seem to be doing quite well.  No pain issues no Tylenol as needed does have a chance see orthopedics tomorrow the 29th of her last visit was on the 13th.  Her neuropathy is well managed using gabapentin 700 mg twice daily.  She has been normotensive and afebrile.  No hypotensive issues.  Recently did discontinue the Midrin.  Most of the systolics have been less than 115.  For her cognition she is on Aricept.  Appetite good.  According to therapy they are limiting due to her ankles as well as nonweightbearing status he is minimal assist with slide board transfers.  Expect there is at upcoming care conference perhaps even going to long-term care prior to any potential discharge.    Past Medical History:   Diagnosis Date   ? Dementia (H)    ? Hypotension    ? Neuropathy              No family history on file.  Social History     Socioeconomic History   ? Marital status:      Spouse name: Not on file   ? Number of children: Not on file   ? Years of education: Not on file   ? Highest education level: Not on file   Occupational History   ? Not on file   Social Needs   ? Financial resource strain: Not on file   ? Food insecurity:     Worry: Not on file     Inability: Not on file   ?  Transportation needs:     Medical: Not on file     Non-medical: Not on file   Tobacco Use   ? Smoking status: Never Smoker   ? Smokeless tobacco: Never Used   Substance and Sexual Activity   ? Alcohol use: Not Currently   ? Drug use: Never   ? Sexual activity: Not Currently   Lifestyle   ? Physical activity:     Days per week: Not on file     Minutes per session: Not on file   ? Stress: Not on file   Relationships   ? Social connections:     Talks on phone: Not on file     Gets together: Not on file     Attends [employer] service: Not on file     Active member of club or organization: Not on file     Attends meetings of clubs or organizations: Not on file     Relationship status: Not on file   ? Intimate partner violence:     Fear of current or ex partner: Not on file     Emotionally abused: Not on file     Physically abused: Not on file     Forced sexual activity: Not on file   Other Topics Concern   ? Not on file   Social History Narrative   ? Not on file         Review of Systems  Currently she denies chills and fever coughing wheezing chest pain dizziness or vertigo nausea vomiting diarrhea dysuria flulike symptoms headache or stiff neck.  History of cognitive impairment hyperlipidemia and syncopal episodes and most recently bilateral ankle fracture secondary to a fall.  Neuropathic pain.         Current Outpatient Medications:   ?  acetaminophen (TYLENOL) 500 MG tablet, Take 1 tablet (500 mg total) by mouth every 6 (six) hours as needed for pain or fever., Disp: , Rfl: 0  ?  aspirin 81 mg chewable tablet, Chew 1 tablet (81 mg total) 2 (two) times a day., Disp: , Rfl: 0  ?  bisacodyl (DULCOLAX) 10 mg suppository, Insert suppository rectally daily as needed for treatment of constipation., Disp: , Rfl: 0  ?  donepezil (ARICEPT) 10 MG tablet, Take 10 mg by mouth at bedtime., Disp: , Rfl:   ?  gabapentin (NEURONTIN) 600 MG tablet, Take 700 mg by mouth 2 (two) times a day. , Disp: , Rfl:   ?  magnesium hydroxide (MILK  OF MAG) 400 mg/5 mL Susp suspension, Take 30 mL by mouth daily as needed., Disp: , Rfl: 0  ?  polyethylene glycol (MIRALAX) 17 gram packet, Take 1 packet (17 g total) by mouth daily as needed., Disp: , Rfl: 0  ?  polyvinyl alcohol (LIQUIFILM TEARS) 1.4 % ophthalmic solution, Apply 1 drop to eye as needed for dry eyes., Disp: , Rfl:   ?  rosuvastatin (CRESTOR) 10 MG tablet, Take 10 mg by mouth daily. , Disp: , Rfl:     There were no vitals filed for this visit.  Blood pressure 115/67 pulse 69 respirations 18 temperature 97.9  Physical Exam  Head is normocephalic.     Lungs are clear throughout.  Cardiovascular is normal without murmurs.  No lower extremity edema.  Gastrointestinal nondistended.  Musculoskeletal moves upper extremities.  Bilateral lower extremities in Cam boots.  Positive CMS to her feet as well as popliteal pulses.  Psychiatric Pleasant affect.  Does have cognitive impairment.          LABS:   Lab Results   Component Value Date    WBC 7.5 12/30/2019    HGB 11.3 (L) 12/30/2019    HCT 39.1 12/29/2019    MCV 93 12/29/2019     12/29/2019     Results for orders placed or performed during the hospital encounter of 12/29/19   Basic metabolic panel   Result Value Ref Range    Sodium 140 136 - 145 mmol/L    Potassium 4.2 3.5 - 5.0 mmol/L    Chloride 107 98 - 107 mmol/L    CO2 25 22 - 31 mmol/L    Anion Gap, Calculation 8 5 - 18 mmol/L    Glucose 96 70 - 125 mg/dL    Calcium 9.1 8.5 - 10.5 mg/dL    BUN 19 8 - 28 mg/dL    Creatinine 0.87 0.60 - 1.10 mg/dL    GFR MDRD Af Amer >60 >60 mL/min/1.73m2    GFR MDRD Non Af Amer >60 >60 mL/min/1.73m2           ASSESSMENT:      ICD-10-CM    1. Closed fracture of both ankles with routine healing, subsequent encounter S82.891D     S82.892D    2. Hypotension, unspecified hypotension type I95.9    3. Neuropathy G62.9    4. Cognitive impairment R41.89        PLAN:    Once again seems to be doing fine is stable overall.  Therapy is limited.  Needs a care conference.   Perhaps going to long-term care.  Does have a visit with orthopedics tomorrow for recheck.      Electronically signed by: Michael Duane Johnson, CNP

## 2021-06-20 NOTE — LETTER
Letter by Johnson, Michael Duane, CNP at      Author: Johnson, Michael Duane, CNP Service: -- Author Type: --    Filed:  Encounter Date: 2/6/2020 Status: (Other)         Patient: Kat Up   MR Number: [id number]   YOB: 1935   Date of Visit: 2/6/2020     [facility]    Facility:   [facility] [[id number]]   Code Status: DNR      CHIEF COMPLAINT/REASON FOR VISIT:  Chief Complaint   Patient presents with   ? Follow Up     rehab, ankles.       HISTORY:      HPI: Kat is a 84 y.o. female was still in transitional care and secondary to her hospitalization December 29 through January 1, 2020 secondary to syncope and bilateral ankle fractures.  At this time very minimal rehabilitation due to nonweightbearing status.  Her next follow-up with orthopedics is February 24.  They can do some passive range of motion.  Not having any blood pressure issues has a history of hypotension.  Denies any pain is on gabapentin 700 mg twice a day for neuropathy.  Does have cognitive impairment is on Aricept.  Appetite is good.  Denies any colds or flus or other problems.    Past Medical History:   Diagnosis Date   ? Dementia (H)    ? Hypotension    ? Neuropathy              No family history on file.  Social History     Socioeconomic History   ? Marital status:      Spouse name: Not on file   ? Number of children: Not on file   ? Years of education: Not on file   ? Highest education level: Not on file   Occupational History   ? Not on file   Social Needs   ? Financial resource strain: Not on file   ? Food insecurity:     Worry: Not on file     Inability: Not on file   ? Transportation needs:     Medical: Not on file     Non-medical: Not on file   Tobacco Use   ? Smoking status: Never Smoker   ? Smokeless tobacco: Never Used   Substance and Sexual Activity   ? Alcohol use: Not Currently   ? Drug use: Never   ? Sexual activity: Not Currently   Lifestyle   ?  Physical activity:     Days per week: Not on file     Minutes per session: Not on file   ? Stress: Not on file   Relationships   ? Social connections:     Talks on phone: Not on file     Gets together: Not on file     Attends Pentecostalism service: Not on file     Active member of club or organization: Not on file     Attends meetings of clubs or organizations: Not on file     Relationship status: Not on file   ? Intimate partner violence:     Fear of current or ex partner: Not on file     Emotionally abused: Not on file     Physically abused: Not on file     Forced sexual activity: Not on file   Other Topics Concern   ? Not on file   Social History Narrative   ? Not on file         Review of Systems  Currently she denies chills and fever coughing wheezing chest pain dizziness or vertigo nausea vomiting diarrhea dysuria flulike symptoms headache or stiff neck.  History of cognitive impairment hyperlipidemia and syncopal episodes and most recently bilateral ankle fracture secondary to a fall.  Neuropathic pain.         Current Outpatient Medications:   ?  acetaminophen (TYLENOL) 500 MG tablet, Take 1 tablet (500 mg total) by mouth every 6 (six) hours as needed for pain or fever., Disp: , Rfl: 0  ?  aspirin 81 mg chewable tablet, Chew 1 tablet (81 mg total) 2 (two) times a day., Disp: , Rfl: 0  ?  bisacodyl (DULCOLAX) 10 mg suppository, Insert suppository rectally daily as needed for treatment of constipation., Disp: , Rfl: 0  ?  donepezil (ARICEPT) 10 MG tablet, Take 10 mg by mouth at bedtime., Disp: , Rfl:   ?  gabapentin (NEURONTIN) 600 MG tablet, Take 700 mg by mouth 2 (two) times a day. , Disp: , Rfl:   ?  magnesium hydroxide (MILK OF MAG) 400 mg/5 mL Susp suspension, Take 30 mL by mouth daily as needed., Disp: , Rfl: 0  ?  polyethylene glycol (MIRALAX) 17 gram packet, Take 1 packet (17 g total) by mouth daily as needed., Disp: , Rfl: 0  ?  polyvinyl alcohol (LIQUIFILM TEARS) 1.4 % ophthalmic solution, Apply 1 drop  to eye as needed for dry eyes., Disp: , Rfl:   ?  rosuvastatin (CRESTOR) 10 MG tablet, Take 10 mg by mouth daily. , Disp: , Rfl:     There were no vitals filed for this visit.  Blood pressure 102/68 pulse 72 respirations 18 saturation room air 95%  Physical Exam  Head is normocephalic.     Lungs are clear throughout.  Cardiovascular is normal without murmurs.  No lower extremity edema.  Gastrointestinal nondistended.  Musculoskeletal moves upper extremities.  Bilateral lower extremities in Cam boots.  Positive CMS to her feet as well as popliteal pulses.  Psychiatric Pleasant affect.  Does have cognitive impairment.           LABS:   Lab Results   Component Value Date    WBC 7.5 12/30/2019    HGB 11.3 (L) 12/30/2019    HCT 39.1 12/29/2019    MCV 93 12/29/2019     12/29/2019         ASSESSMENT:      ICD-10-CM    1. Closed fracture of both ankles with routine healing, subsequent encounter S82.891D     S82.892D    2. Cognitive impairment R41.89    3. Neuropathy G62.9    4. Hypotension, unspecified hypotension type I95.9        PLAN:    No other new changes with this particular visit.  She seems very comfortable.  Good CMS to the lower extremities.  No edema or other problems.  Continue to manage and follow.      Electronically signed by: Michael Duane Johnson, CNP

## 2021-06-20 NOTE — LETTER
Letter by Johnson, Michael Duane, CNP at      Author: Johnson, Michael Duane, CNP Service: -- Author Type: --    Filed:  Encounter Date: 1/2/2020 Status: Signed         Patient: Kat Up   MR Number: [id number]   YOB: 1935   Date of Visit: 1/2/2020     [facility]      Facility:    [facility] [[id number]]  Code Status: DNR      Chief Complaint/Reason for Visit:  Chief Complaint   Patient presents with   ? Problem Visit     asked to see       HPI:   Kat is a 84 y.o. female who who I had the opportunity to revisit with secondary to her hospital admission December 29, 2019 through January 1, 2020 secondary to syncope with multiple falls.  The left ankle x-ray did show a nondisplaced acute transverse fracture distal meta physis left fibula just inferior to the tibiofibular syndesmotic joint which remains intact.  Normal alignment.  Soft tissue swelling.  The right ankle x-ray did show a nondisplaced transverse fracture distal meta physis right fibula just inferior to the right tibiofibular syndesmotic joint.  Soft tissue swelling.  Nonsurgical.  Cam boots and nonweightbearing.  The syncope did have an unclear etiology it did sound like vasovagal no history of cardiac disease.  She was admitted to cardiac telemetry the serial troponins were negative.  She apparently had 2 episodes of syncope that were unwitnessed in the early morning of her admission.  Her  did hear a fall and found his wife on the floor.  No seizure-like activity was noted.  Then second episode occurred after breakfast which was witnessed by the spouse.  The patient does not recall any events.  No other new health changes and no other new medication changes.  The laboratory studies on December 2019 show a sodium of 140 potassium 4.5 BUN of 16 creatinine 1.01 albumin 3.9 unremarkable liver enzymes magnesium 2.1.  White cells 8.9 hemoglobin 12.5 and platelets 197.  She  currently is in the transitional care unit.  I remember her from my previous admission to the assisted living facility in the beginning of December.  While on the transitional care and her blood pressures have been ranging 119-132 systolically.  She has been afebrile and also on room air.  In talking with her she does feel like she is doing okay.  She denies any pain.  Her appetite has been good.  Denies any bowel or bladder problems.  Had a chance to go over her hospitalization as well as her records and laboratory studies.    Past Medical History:  Past Medical History:   Diagnosis Date   ? Dementia (H)    ? Hypotension    ? Neuropathy            Surgical History:  No past surgical history on file.    Family History:   No family history on file.    Social History:    Social History     Socioeconomic History   ? Marital status:      Spouse name: Not on file   ? Number of children: Not on file   ? Years of education: Not on file   ? Highest education level: Not on file   Occupational History   ? Not on file   Social Needs   ? Financial resource strain: Not on file   ? Food insecurity:     Worry: Not on file     Inability: Not on file   ? Transportation needs:     Medical: Not on file     Non-medical: Not on file   Tobacco Use   ? Smoking status: Never Smoker   ? Smokeless tobacco: Never Used   Substance and Sexual Activity   ? Alcohol use: Not Currently   ? Drug use: Never   ? Sexual activity: Not Currently   Lifestyle   ? Physical activity:     Days per week: Not on file     Minutes per session: Not on file   ? Stress: Not on file   Relationships   ? Social connections:     Talks on phone: Not on file     Gets together: Not on file     Attends Mormonism service: Not on file     Active member of club or organization: Not on file     Attends meetings of clubs or organizations: Not on file     Relationship status: Not on file   ? Intimate partner violence:     Fear of current or ex partner: Not on file      Emotionally abused: Not on file     Physically abused: Not on file     Forced sexual activity: Not on file   Other Topics Concern   ? Not on file   Social History Narrative   ? Not on file          Review of Systems  Currently she denies chills and fever coughing wheezing chest pain dizziness or vertigo nausea vomiting diarrhea dysuria flulike symptoms headache or stiff neck.  History of cognitive impairment hyperlipidemia and syncopal episodes and most recently bilateral ankle fracture secondary to a fall.  Neuropathic pain.  There were no vitals filed for this visit.  Blood pressure 132/83 pulse 87 respiration 16 saturation room air 93%  Physical Exam  Head is normocephalic.  Conjunctiva is pink sclerae clear.  Neck is supple without adenopathy.  Lungs are clear throughout.  Cardiovascular is normal without murmurs.  No lower extremity edema.  Gastrointestinal slightly protuberant nontender nondistended.  Musculoskeletal moves upper extremities.  Bilateral lower extremities in Cam boots.  Positive CMS to her feet as well as popliteal pulses.  Psychiatric Pleasant affect.  Does have cognitive impairment.  Medication List:  Current Outpatient Medications   Medication Sig   ? acetaminophen (TYLENOL) 500 MG tablet Take 1 tablet (500 mg total) by mouth every 6 (six) hours as needed for pain or fever.   ? aspirin 81 mg chewable tablet Chew 1 tablet (81 mg total) 2 (two) times a day.   ? bisacodyl (DULCOLAX) 10 mg suppository Insert suppository rectally daily as needed for treatment of constipation.   ? donepezil (ARICEPT) 10 MG tablet Take 10 mg by mouth at bedtime.   ? gabapentin (NEURONTIN) 600 MG tablet Take 600 mg by mouth 3 (three) times a day.   ? magnesium hydroxide (MILK OF MAG) 400 mg/5 mL Susp suspension Take 30 mL by mouth daily as needed.   ? midodrine (PROAMATINE) 10 MG tablet Take 10 mg by mouth 3 (three) times a day with meals.   ? polyethylene glycol (MIRALAX) 17 gram packet Take 1 packet (17 g total) by  mouth daily as needed.   ? polyvinyl alcohol (LIQUIFILM TEARS) 1.4 % ophthalmic solution Apply 1 drop to eye as needed for dry eyes.   ? rosuvastatin (CRESTOR) 10 MG tablet Take 10 mg by mouth daily.        Labs:  Lab Results   Component Value Date    WBC 7.5 12/30/2019    HGB 11.3 (L) 12/30/2019    HCT 39.1 12/29/2019    MCV 93 12/29/2019     12/29/2019     Results for orders placed or performed during the hospital encounter of 12/29/19   Basic metabolic panel   Result Value Ref Range    Sodium 140 136 - 145 mmol/L    Potassium 4.2 3.5 - 5.0 mmol/L    Chloride 107 98 - 107 mmol/L    CO2 25 22 - 31 mmol/L    Anion Gap, Calculation 8 5 - 18 mmol/L    Glucose 96 70 - 125 mg/dL    Calcium 9.1 8.5 - 10.5 mg/dL    BUN 19 8 - 28 mg/dL    Creatinine 0.87 0.60 - 1.10 mg/dL    GFR MDRD Af Amer >60 >60 mL/min/1.73m2    GFR MDRD Non Af Amer >60 >60 mL/min/1.73m2     Lab Results   Component Value Date    ALT 18 12/29/2019    AST 25 12/29/2019    ALKPHOS 78 12/29/2019    BILITOT 0.4 12/29/2019     Lab Results   Component Value Date    ALBUMIN 3.9 12/29/2019         Assessment:    ICD-10-CM    1. Closed fracture of both ankles with routine healing, subsequent encounter S82.891D     S82.892D    2. Neuropathy G62.9    3. Vasovagal syncope R55    4. Cognitive impairment R41.89        Plan:  At this point a minute have staff wash and dry her legs and put lotion on them twice a day because she is in the cam boots.  We will also try to find out when her follow-up with orthopedics has been scheduled.  Continue to monitor the blood pressures as well as her pain status.  She is not having any pain right now and she can have Tylenol as needed but no other narcotics have been ordered.  No laboratory studies are necessary.  Her labs in the hospital unremarkable.    For documentation purposes total visit 40 minutes which over 50% was spent with the patient going over her care her hospitalization her fractures vital signs medications as  well as the stay on the transitional care unit and coordination of care efforts.      Electronically signed by: Michael Duane Johnson, CNP

## 2021-06-20 NOTE — LETTER
Letter by Johnson, Michael Duane, CNP at      Author: Johnson, Michael Duane, CNP Service: -- Author Type: --    Filed:  Encounter Date: 3/3/2020 Status: (Other)         Patient: Kat Up   MR Number: [id number]   YOB: 1935   Date of Visit: 3/3/2020     [facility]    Facility:   [facility] [[id number]]   Code Status: DNR/DNI      CHIEF COMPLAINT/REASON FOR VISIT:  Chief Complaint   Patient presents with   ? Follow Up     rehab, ankles, BP,        HISTORY:      HPI: Kat is a 85 y.o. female who had the opportunity to revisit with her and her  today at only secondary to her hospitalization December 29, 2019 through January 1, 2020 secondary to a syncopal episode thereby sustaining bilateral ankle fractures but also talked about the rehabilitation process.  Both her and her  at one point were living at the assisted living facility on this campus.  They have now since moved out.  Her  was in the hospital for a couple of days now has been living with the daughter.  The plan is to live with the daughter from here on out.  She recently has had an opportunity to visit with orthopedics she is not weightbearing as tolerated making pretty good progress does have her cam boots.  I do not know if there is any other current follow-up or not to her orthopedic visit but they are open to bring her home in the next couple of weeks.  Recently did her cholesterol check on February 12.  She has been normotensive and afebrile and also on room air.  No bowel or bladder issues.  Appetite is good.  Taking Aricept.  For neuropathy having good success with Neurontin 600 mg twice daily also getting extra nutrient supplements.    Past Medical History:   Diagnosis Date   ? Dementia (H)    ? Hypotension    ? Neuropathy              No family history on file.  Social History     Socioeconomic History   ? Marital status:      Spouse name: Not on  file   ? Number of children: Not on file   ? Years of education: Not on file   ? Highest education level: Not on file   Occupational History   ? Not on file   Social Needs   ? Financial resource strain: Not on file   ? Food insecurity:     Worry: Not on file     Inability: Not on file   ? Transportation needs:     Medical: Not on file     Non-medical: Not on file   Tobacco Use   ? Smoking status: Never Smoker   ? Smokeless tobacco: Never Used   Substance and Sexual Activity   ? Alcohol use: Not Currently   ? Drug use: Never   ? Sexual activity: Not Currently   Lifestyle   ? Physical activity:     Days per week: Not on file     Minutes per session: Not on file   ? Stress: Not on file   Relationships   ? Social connections:     Talks on phone: Not on file     Gets together: Not on file     Attends Yazidism service: Not on file     Active member of club or organization: Not on file     Attends meetings of clubs or organizations: Not on file     Relationship status: Not on file   ? Intimate partner violence:     Fear of current or ex partner: Not on file     Emotionally abused: Not on file     Physically abused: Not on file     Forced sexual activity: Not on file   Other Topics Concern   ? Not on file   Social History Narrative   ? Not on file         Review of Systems  Currently she denies chills and fever coughing wheezing chest pain dizziness or vertigo nausea vomiting diarrhea dysuria flulike symptoms headache or stiff neck.  History of cognitive impairment hyperlipidemia and syncopal episodes and most recently bilateral ankle fracture secondary to a fall.  Neuropathic pain.    Current Outpatient Medications   Medication Sig   ? acetaminophen (TYLENOL) 500 MG tablet Take 1 tablet (500 mg total) by mouth every 6 (six) hours as needed for pain or fever.   ? aspirin 81 mg chewable tablet Chew 1 tablet (81 mg total) 2 (two) times a day.   ? bisacodyl (DULCOLAX) 10 mg suppository Insert suppository rectally daily as  needed for treatment of constipation.   ? donepezil (ARICEPT) 10 MG tablet Take 10 mg by mouth at bedtime.   ? gabapentin (NEURONTIN) 600 MG tablet Take 600 mg by mouth 2 (two) times a day.    ? magnesium hydroxide (MILK OF MAG) 400 mg/5 mL Susp suspension Take 30 mL by mouth daily as needed.   ? polyethylene glycol (MIRALAX) 17 gram packet Take 1 packet (17 g total) by mouth daily as needed.   ? polyvinyl alcohol (LIQUIFILM TEARS) 1.4 % ophthalmic solution Apply 1 drop to eye as needed for dry eyes.   ? rosuvastatin (CRESTOR) 10 MG tablet Take 10 mg by mouth daily.        There were no vitals filed for this visit.  Blood pressure 99/63 pulse 74 respirations 16 temperature 97.3  Physical Exam  Head is normocephalic.     Lungs are clear throughout.  Cardiovascular is normal without murmurs.  No lower extremity edema.  Gastrointestinal nondistended.  Musculoskeletal moves upper extremities.  Bilateral lower extremities in Cam boots.  Positive CMS to her feet as well as popliteal pulses.  Psychiatric Pleasant affect.  Does have cognitive impairment.          LABS:   Lab Results   Component Value Date    WBC 7.5 12/30/2019    HGB 11.3 (L) 12/30/2019    HCT 39.1 12/29/2019    MCV 93 12/29/2019     12/29/2019     Results for orders placed or performed during the hospital encounter of 12/29/19   Basic metabolic panel   Result Value Ref Range    Sodium 140 136 - 145 mmol/L    Potassium 4.2 3.5 - 5.0 mmol/L    Chloride 107 98 - 107 mmol/L    CO2 25 22 - 31 mmol/L    Anion Gap, Calculation 8 5 - 18 mmol/L    Glucose 96 70 - 125 mg/dL    Calcium 9.1 8.5 - 10.5 mg/dL    BUN 19 8 - 28 mg/dL    Creatinine 0.87 0.60 - 1.10 mg/dL    GFR MDRD Af Amer >60 >60 mL/min/1.73m2    GFR MDRD Non Af Amer >60 >60 mL/min/1.73m2     Lab Results   Component Value Date    CHOL 132 02/12/2020     Lab Results   Component Value Date    HDL 39 (L) 02/12/2020     Lab Results   Component Value Date    LDLCALC 56 02/12/2020     Lab Results    Component Value Date    TRIG 185 (H) 02/12/2020     No components found for: CHOLHDL      ASSESSMENT:      ICD-10-CM    1. Closed fracture of both ankles with routine healing, subsequent encounter S82.891D     S82.892D    2. Cognitive impairment R41.89    3. Hypotension, unspecified hypotension type I95.9    4. Neuropathy G62.9        PLAN:    No further changes at this time.  Blood pressures are fine pain is also well managed.  Making some progress from therapy.  Does have cognitive impairment.  I did have a chance again to talk to the  today about the treatment plan as well as living at the daughter's home.  He did not have any other questions.        Electronically signed by: Michael Duane Johnson, CNP

## 2021-06-20 NOTE — LETTER
Letter by Johnson, Michael Duane, CNP at      Author: Johnson, Michael Duane, CNP Service: -- Author Type: --    Filed:  Encounter Date: 1/16/2020 Status: Signed         Patient: Kat Up   MR Number: [id number]   YOB: 1935   Date of Visit: 1/16/2020     [facility]    Facility:   [facility] [[id number]]   Code Status: DNR      CHIEF COMPLAINT/REASON FOR VISIT:  Chief Complaint   Patient presents with   ? Follow Up     rehab, ankles.       HISTORY:      HPI: Kat is a 84 y.o. female who had the chance to visit with she and her  secondary to her hospitalization December 29, 2019 through January 1, 2020 secondary syncope and acute bilateral ankle fractures.  She apparently did see orthopedics yesterday I do not have any of those notes but apparently she is still nonweightbearing.  She does wear the cam boots when she is up and about but off while in bed.  No pain issues.  Tired of laying around in bed they are working with slide board transfers.  No Tylenol as needed.  Blood pressures for the most part still less than 110 systolically recently change the Midrin 5 mg 3 times a day rather than 10 mg 3 times daily and if they continue to stay low we will continue to decrease her Midodrin.  For the neuropathy is on gabapentin.  Had a chance to talk about her stay as well as the rehabilitation process.  At this point he did not have any other questions.    Past Medical History:   Diagnosis Date   ? Dementia (H)    ? Hypotension    ? Neuropathy              No family history on file.  Social History     Socioeconomic History   ? Marital status:      Spouse name: Not on file   ? Number of children: Not on file   ? Years of education: Not on file   ? Highest education level: Not on file   Occupational History   ? Not on file   Social Needs   ? Financial resource strain: Not on file   ? Food insecurity:     Worry: Not on file      Inability: Not on file   ? Transportation needs:     Medical: Not on file     Non-medical: Not on file   Tobacco Use   ? Smoking status: Never Smoker   ? Smokeless tobacco: Never Used   Substance and Sexual Activity   ? Alcohol use: Not Currently   ? Drug use: Never   ? Sexual activity: Not Currently   Lifestyle   ? Physical activity:     Days per week: Not on file     Minutes per session: Not on file   ? Stress: Not on file   Relationships   ? Social connections:     Talks on phone: Not on file     Gets together: Not on file     Attends Zoroastrianism service: Not on file     Active member of club or organization: Not on file     Attends meetings of clubs or organizations: Not on file     Relationship status: Not on file   ? Intimate partner violence:     Fear of current or ex partner: Not on file     Emotionally abused: Not on file     Physically abused: Not on file     Forced sexual activity: Not on file   Other Topics Concern   ? Not on file   Social History Narrative   ? Not on file         Review of Systems  Currently she denies chills and fever coughing wheezing chest pain dizziness or vertigo nausea vomiting diarrhea dysuria flulike symptoms headache or stiff neck.  History of cognitive impairment hyperlipidemia and syncopal episodes and most recently bilateral ankle fracture secondary to a fall.  Neuropathic pain.       Current Outpatient Medications   Medication Sig   ? acetaminophen (TYLENOL) 500 MG tablet Take 1 tablet (500 mg total) by mouth every 6 (six) hours as needed for pain or fever.   ? aspirin 81 mg chewable tablet Chew 1 tablet (81 mg total) 2 (two) times a day.   ? bisacodyl (DULCOLAX) 10 mg suppository Insert suppository rectally daily as needed for treatment of constipation.   ? donepezil (ARICEPT) 10 MG tablet Take 10 mg by mouth at bedtime.   ? gabapentin (NEURONTIN) 600 MG tablet Take 700 mg by mouth 2 (two) times a day.    ? magnesium hydroxide (MILK OF MAG) 400 mg/5 mL Susp suspension Take  30 mL by mouth daily as needed.   ? midodrine (PROAMATINE) 10 MG tablet Take 5 mg by mouth 3 (three) times a day with meals.    ? polyethylene glycol (MIRALAX) 17 gram packet Take 1 packet (17 g total) by mouth daily as needed.   ? polyvinyl alcohol (LIQUIFILM TEARS) 1.4 % ophthalmic solution Apply 1 drop to eye as needed for dry eyes.   ? rosuvastatin (CRESTOR) 10 MG tablet Take 10 mg by mouth daily.        There were no vitals filed for this visit.  Blood pressure 110/71 pulse 69 respiration 16 temperature 98.9  Physical Exam   Head is normocephalic.    Neck is supple without adenopathy.  Lungs are clear throughout.  Cardiovascular is normal without murmurs.  No lower extremity edema.  Gastrointestinal nondistended.  Musculoskeletal moves upper extremities.  Bilateral lower extremities in Cam boots.  Positive CMS to her feet as well as popliteal pulses.  Psychiatric Pleasant affect.  Does have cognitive impairment.        LABS:   Lab Results   Component Value Date    WBC 7.5 12/30/2019    HGB 11.3 (L) 12/30/2019    HCT 39.1 12/29/2019    MCV 93 12/29/2019     12/29/2019     Results for orders placed or performed during the hospital encounter of 12/29/19   Basic metabolic panel   Result Value Ref Range    Sodium 140 136 - 145 mmol/L    Potassium 4.2 3.5 - 5.0 mmol/L    Chloride 107 98 - 107 mmol/L    CO2 25 22 - 31 mmol/L    Anion Gap, Calculation 8 5 - 18 mmol/L    Glucose 96 70 - 125 mg/dL    Calcium 9.1 8.5 - 10.5 mg/dL    BUN 19 8 - 28 mg/dL    Creatinine 0.87 0.60 - 1.10 mg/dL    GFR MDRD Af Amer >60 >60 mL/min/1.73m2    GFR MDRD Non Af Amer >60 >60 mL/min/1.73m2           ASSESSMENT:      ICD-10-CM    1. Closed fracture of both ankles with routine healing, subsequent encounter S82.891D     S82.892D    2. Hypotension, unspecified hypotension type I95.9    3. Neuropathy G62.9    4. Physical debility R53.81        PLAN:    No other new changes at this time.  Did see orthopedics yesterday but I do not have  those notes so I really do not know what their next plan is although the  does tell me that they are going to follow-up again in about 2 weeks the meantime to still do some therapy and some slide board transfers.    Electronically signed by: Michael Duane Johnson, CNP

## 2021-06-20 NOTE — LETTER
Letter by Johnson, Michael Duane, CNP at      Author: Johnson, Michael Duane, CNP Service: -- Author Type: --    Filed:  Encounter Date: 3/5/2020 Status: (Other)         Patient: Kat Up   MR Number: [id number]   YOB: 1935   Date of Visit: 3/5/2020     [facility]    Facility:   [facility] [[id number]]   Code Status: DNR      CHIEF COMPLAINT/REASON FOR VISIT:  Chief Complaint   Patient presents with   ? Follow Up     ankles, rehab, ortho appt, bp       HISTORY:      HPI: Kat is a 85 y.o. female who is still in the transitional care unit secondary to her hospitalization December 29, 2019 through 21st 2020 secondary to bilateral ankle fractures.  Did have a chance to see orthopedics yesterday I do not have any of the notes yet I did have a chance to talk to therapy and they want to work with her another 2-3 more weeks she is ambulating as tolerated greater than 100 feet.  Also coincidentally she has been in the room independently ambulating and no recent fall or other injury.  She does have cognitive impairment.  Her  does come and visit with her through out the day.  Upon discharge from here she is in the go live with her daughter.  Her  and her used to have an apartment in this assisted living facility and they are both going to live with the daughter.  Otherwise she has been normotensive and afebrile and also on room air.  Getting extra nutrient supplements.  Denies pain.  Denies neuropathy.  Recently did check the cholesterol see results below.    Past Medical History:   Diagnosis Date   ? Dementia (H)    ? Hypotension    ? Neuropathy              No family history on file.  Social History     Socioeconomic History   ? Marital status:      Spouse name: Not on file   ? Number of children: Not on file   ? Years of education: Not on file   ? Highest education level: Not on file   Occupational History   ? Not on file    Social Needs   ? Financial resource strain: Not on file   ? Food insecurity:     Worry: Not on file     Inability: Not on file   ? Transportation needs:     Medical: Not on file     Non-medical: Not on file   Tobacco Use   ? Smoking status: Never Smoker   ? Smokeless tobacco: Never Used   Substance and Sexual Activity   ? Alcohol use: Not Currently   ? Drug use: Never   ? Sexual activity: Not Currently   Lifestyle   ? Physical activity:     Days per week: Not on file     Minutes per session: Not on file   ? Stress: Not on file   Relationships   ? Social connections:     Talks on phone: Not on file     Gets together: Not on file     Attends Shinto service: Not on file     Active member of club or organization: Not on file     Attends meetings of clubs or organizations: Not on file     Relationship status: Not on file   ? Intimate partner violence:     Fear of current or ex partner: Not on file     Emotionally abused: Not on file     Physically abused: Not on file     Forced sexual activity: Not on file   Other Topics Concern   ? Not on file   Social History Narrative   ? Not on file         Review of Systems  Currently she denies chills and fever coughing wheezing chest pain dizziness or vertigo nausea vomiting diarrhea dysuria flulike symptoms headache or stiff neck.  History of cognitive impairment hyperlipidemia and syncopal episodes and most recently bilateral ankle fracture secondary to a fall.  Neuropathic pain.  Current Outpatient Medications   Medication Sig   ? acetaminophen (TYLENOL) 500 MG tablet Take 1 tablet (500 mg total) by mouth every 6 (six) hours as needed for pain or fever.   ? aspirin 81 mg chewable tablet Chew 1 tablet (81 mg total) 2 (two) times a day.   ? bisacodyl (DULCOLAX) 10 mg suppository Insert suppository rectally daily as needed for treatment of constipation.   ? donepezil (ARICEPT) 10 MG tablet Take 10 mg by mouth at bedtime.   ? gabapentin (NEURONTIN) 600 MG tablet Take 600 mg by  mouth 2 (two) times a day.    ? magnesium hydroxide (MILK OF MAG) 400 mg/5 mL Susp suspension Take 30 mL by mouth daily as needed.   ? polyethylene glycol (MIRALAX) 17 gram packet Take 1 packet (17 g total) by mouth daily as needed.   ? polyvinyl alcohol (LIQUIFILM TEARS) 1.4 % ophthalmic solution Apply 1 drop to eye as needed for dry eyes.   ? rosuvastatin (CRESTOR) 10 MG tablet Take 10 mg by mouth daily.        There were no vitals filed for this visit.  Blood pressure 111/74 pulse 65 respirations 18 temperature 98.3 saturation room air 96%  Physical Exam  Head is normocephalic.     Lungs are clear throughout.  Cardiovascular is normal without murmurs.  No lower extremity edema.  Gastrointestinal nondistended.  Musculoskeletal ;denies discomfort.  Bilateral lower extremities in Cam boots.  Positive CMS to her feet as well as popliteal pulses.  Psychiatric Pleasant affect.  Does have cognitive impairment.         LABS:   Lab Results   Component Value Date    WBC 7.5 12/30/2019    HGB 11.3 (L) 12/30/2019    HCT 39.1 12/29/2019    MCV 93 12/29/2019     12/29/2019     Lab Results   Component Value Date    CHOL 132 02/12/2020     Lab Results   Component Value Date    HDL 39 (L) 02/12/2020     Lab Results   Component Value Date    LDLCALC 56 02/12/2020     Lab Results   Component Value Date    TRIG 185 (H) 02/12/2020     No components found for: CHOLHDL      ASSESSMENT:      ICD-10-CM    1. Closed fracture of both ankles with routine healing, subsequent encounter S82.891D     S82.892D    2. Hypotension, unspecified hypotension type I95.9    3. Neuropathy G62.9    4. Cognitive impairment R41.89        PLAN:    No further changes with this particular visit.  Blood pressures look good.  Recently did see orthopedics on the fourth and I do not have any of those notes that therapy also was trying to look with the notes but they want to continue to progress and they think that she needs to be here at least another 2-3  more weeks.      Electronically signed by: Michael Duane Johnson, LUCRECIA

## 2021-06-20 NOTE — LETTER
Letter by Johnson, Michael Duane, CNP at      Author: Johnson, Michael Duane, CNP Service: -- Author Type: --    Filed:  Encounter Date: 2/11/2020 Status: (Other)         Patient: Kat Up   MR Number: [id number]   YOB: 1935   Date of Visit: 2/11/2020     [facility]    Facility:   [facility] [place] [[id number]]   Code Status: DNR      CHIEF COMPLAINT/REASON FOR VISIT:  Chief Complaint   Patient presents with   ? Follow Up     rehab, demen       HISTORY:      HPI: Kat is a 84 y.o. female who I the opportunity revisiting with secondary to her hospitalization December 29 through January 1, 2020 secondary syncope and bilateral ankle fractures.  Did see orthopedics on the 29th her next visit is February 24.  Still nonweightbearing.  No pain issues she does have neuropathy gabapentin 700 mg twice daily have not been a decrease at the 600 mg twice daily I do not think she needs that much.  Does not have any hypotensive issues has been normotensive and afebrile and also on room air.  Denies any bowel or bladder issues.  Is on Crestor 10 mg.  I think at this point she is due to have her cholesterol checked since she is been here quite a while and it was seen thing in the computer.  Therapy is pretty limited.  She did not have any other questions or other issues.  She seems to be very pleasant and cooperative.    Past Medical History:   Diagnosis Date   ? Dementia (H)    ? Hypotension    ? Neuropathy              No family history on file.  Social History     Socioeconomic History   ? Marital status:      Spouse name: Not on file   ? Number of children: Not on file   ? Years of education: Not on file   ? Highest education level: Not on file   Occupational History   ? Not on file   Social Needs   ? Financial resource strain: Not on file   ? Food insecurity:     Worry: Not on file     Inability: Not on file   ? Transportation needs:     Medical: Not on  file     Non-medical: Not on file   Tobacco Use   ? Smoking status: Never Smoker   ? Smokeless tobacco: Never Used   Substance and Sexual Activity   ? Alcohol use: Not Currently   ? Drug use: Never   ? Sexual activity: Not Currently   Lifestyle   ? Physical activity:     Days per week: Not on file     Minutes per session: Not on file   ? Stress: Not on file   Relationships   ? Social connections:     Talks on phone: Not on file     Gets together: Not on file     Attends Orthodox service: Not on file     Active member of club or organization: Not on file     Attends meetings of clubs or organizations: Not on file     Relationship status: Not on file   ? Intimate partner violence:     Fear of current or ex partner: Not on file     Emotionally abused: Not on file     Physically abused: Not on file     Forced sexual activity: Not on file   Other Topics Concern   ? Not on file   Social History Narrative   ? Not on file         Review of Systems  Currently she denies chills and fever coughing wheezing chest pain dizziness or vertigo nausea vomiting diarrhea dysuria flulike symptoms headache or stiff neck.  History of cognitive impairment hyperlipidemia and syncopal episodes and most recently bilateral ankle fracture secondary to a fall.  Neuropathic pain.    Current Outpatient Medications   Medication Sig   ? acetaminophen (TYLENOL) 500 MG tablet Take 1 tablet (500 mg total) by mouth every 6 (six) hours as needed for pain or fever.   ? aspirin 81 mg chewable tablet Chew 1 tablet (81 mg total) 2 (two) times a day.   ? bisacodyl (DULCOLAX) 10 mg suppository Insert suppository rectally daily as needed for treatment of constipation.   ? donepezil (ARICEPT) 10 MG tablet Take 10 mg by mouth at bedtime.   ? gabapentin (NEURONTIN) 600 MG tablet Take 600 mg by mouth 2 (two) times a day.    ? magnesium hydroxide (MILK OF MAG) 400 mg/5 mL Susp suspension Take 30 mL by mouth daily as needed.   ? polyethylene glycol (MIRALAX) 17 gram  packet Take 1 packet (17 g total) by mouth daily as needed.   ? polyvinyl alcohol (LIQUIFILM TEARS) 1.4 % ophthalmic solution Apply 1 drop to eye as needed for dry eyes.   ? rosuvastatin (CRESTOR) 10 MG tablet Take 10 mg by mouth daily.        There were no vitals filed for this visit.  Blood pressure 110/65 pulse 67 respirations 16 temperature 98.1 saturation room air 96%  Physical Exam  Head is normocephalic.     Lungs are clear throughout.  Cardiovascular is normal without murmurs.  No lower extremity edema.  Gastrointestinal nondistended.  Musculoskeletal moves upper extremities.  Bilateral lower extremities in Cam boots.  Positive CMS to her feet as well as popliteal pulses.  Psychiatric Pleasant affect.  Does have cognitive impairment.             LABS:   Lab Results   Component Value Date    WBC 7.5 12/30/2019    HGB 11.3 (L) 12/30/2019    HCT 39.1 12/29/2019    MCV 93 12/29/2019     12/29/2019         ASSESSMENT:      ICD-10-CM    1. Closed fracture of both ankles with routine healing, subsequent encounter S82.891D     S82.892D    2. Hypotension, unspecified hypotension type I95.9    3. Cognitive impairment R41.89    4. Neuropathy G62.9        PLAN:    Adding in a lipid panel because she is on Crestor and then also decreasing the gabapentin 600 mg twice daily.  Does not see orthopedics again until February 24 and is nonweightbearing.      Electronically signed by: Michael Duane Johnson, LUCRECIA

## 2021-06-20 NOTE — LETTER
Letter by Johnson, Michael Duane, CNP at      Author: Johnson, Michael Duane, CNP Service: -- Author Type: --    Filed:  Encounter Date: 1/9/2020 Status: Signed         Patient: Kat Up   MR Number: [id number]   YOB: 1935   Date of Visit: 1/9/2020     [facility]    Facility:   [facility] [[id number]]   Code Status: DNR      CHIEF COMPLAINT/REASON FOR VISIT:  Chief Complaint   Patient presents with   ? Follow Up     rehab, ankles.        HISTORY:      HPI: Kat is a 84 y.o. female who I had the opportunity and pleasure to revisit with secondary to her hospitalization December 29, 2019 through January 1, 2020 secondary syncope falls and acute bilateral ankle fractures.  She does have a cam boots on.  Good CMS to her feet bilaterally.  Earlier this week did decrease the Midrin 5 mg 3 times a day rather than 10 mg 3 times a day due to low blood pressures the blood pressures did improve the used to be in the 90 range systolically and now they are at least 100-120 systolically.  Has been asymptomatic although does have cognitive impairment but overall has been able to participate in therapy and there are no reports of any hypotension or dizziness.  Also changed up her gabapentin to lowered it she used to be on 600 mg 3 times daily now she is on 700 mg twice daily.  She does have a regular diet.  No bowel or bladder problems.  Only taken one Tylenol since she has been here for pain.  Still working with therapy.  She is supposed to see orthopedics on the 13th.  According to the therapy group they want to keep her here least another 2 weeks because she still moderate assist with slide board transfer.    Past Medical History:   Diagnosis Date   ? Dementia (H)    ? Hypotension    ? Neuropathy              No family history on file.  Social History     Socioeconomic History   ? Marital status:      Spouse name: Not on file   ? Number of  children: Not on file   ? Years of education: Not on file   ? Highest education level: Not on file   Occupational History   ? Not on file   Social Needs   ? Financial resource strain: Not on file   ? Food insecurity:     Worry: Not on file     Inability: Not on file   ? Transportation needs:     Medical: Not on file     Non-medical: Not on file   Tobacco Use   ? Smoking status: Never Smoker   ? Smokeless tobacco: Never Used   Substance and Sexual Activity   ? Alcohol use: Not Currently   ? Drug use: Never   ? Sexual activity: Not Currently   Lifestyle   ? Physical activity:     Days per week: Not on file     Minutes per session: Not on file   ? Stress: Not on file   Relationships   ? Social connections:     Talks on phone: Not on file     Gets together: Not on file     Attends Muslim service: Not on file     Active member of club or organization: Not on file     Attends meetings of clubs or organizations: Not on file     Relationship status: Not on file   ? Intimate partner violence:     Fear of current or ex partner: Not on file     Emotionally abused: Not on file     Physically abused: Not on file     Forced sexual activity: Not on file   Other Topics Concern   ? Not on file   Social History Narrative   ? Not on file         Review of Systems  Currently she denies chills and fever coughing wheezing chest pain dizziness or vertigo nausea vomiting diarrhea dysuria flulike symptoms headache or stiff neck.  History of cognitive impairment hyperlipidemia and syncopal episodes and most recently bilateral ankle fracture secondary to a fall.  Neuropathic pain.  Current Outpatient Medications   Medication Sig   ? acetaminophen (TYLENOL) 500 MG tablet Take 1 tablet (500 mg total) by mouth every 6 (six) hours as needed for pain or fever.   ? aspirin 81 mg chewable tablet Chew 1 tablet (81 mg total) 2 (two) times a day.   ? bisacodyl (DULCOLAX) 10 mg suppository Insert suppository rectally daily as needed for treatment of  constipation.   ? donepezil (ARICEPT) 10 MG tablet Take 10 mg by mouth at bedtime.   ? gabapentin (NEURONTIN) 600 MG tablet Take 700 mg by mouth 2 (two) times a day.    ? magnesium hydroxide (MILK OF MAG) 400 mg/5 mL Susp suspension Take 30 mL by mouth daily as needed.   ? midodrine (PROAMATINE) 10 MG tablet Take 5 mg by mouth 3 (three) times a day with meals.    ? polyethylene glycol (MIRALAX) 17 gram packet Take 1 packet (17 g total) by mouth daily as needed.   ? polyvinyl alcohol (LIQUIFILM TEARS) 1.4 % ophthalmic solution Apply 1 drop to eye as needed for dry eyes.   ? rosuvastatin (CRESTOR) 10 MG tablet Take 10 mg by mouth daily.        There were no vitals filed for this visit.  Blood pressure 123/77 pulse 87 respirations 16 temperature 98.8 saturation room air 97%  Physical Exam  Head is normocephalic.    Neck is supple without adenopathy.  Lungs are clear throughout.  Cardiovascular is normal without murmurs.  No lower extremity edema.  Gastrointestinal slightly protuberant nontender nondistended.  Musculoskeletal moves upper extremities.  Bilateral lower extremities in Cam boots.  Positive CMS to her feet as well as popliteal pulses.  Psychiatric Pleasant affect.  Does have cognitive impairment.       LABS:   Lab Results   Component Value Date    WBC 7.5 12/30/2019    HGB 11.3 (L) 12/30/2019    HCT 39.1 12/29/2019    MCV 93 12/29/2019     12/29/2019     Results for orders placed or performed during the hospital encounter of 12/29/19   Basic metabolic panel   Result Value Ref Range    Sodium 140 136 - 145 mmol/L    Potassium 4.2 3.5 - 5.0 mmol/L    Chloride 107 98 - 107 mmol/L    CO2 25 22 - 31 mmol/L    Anion Gap, Calculation 8 5 - 18 mmol/L    Glucose 96 70 - 125 mg/dL    Calcium 9.1 8.5 - 10.5 mg/dL    BUN 19 8 - 28 mg/dL    Creatinine 0.87 0.60 - 1.10 mg/dL    GFR MDRD Af Amer >60 >60 mL/min/1.73m2    GFR MDRD Non Af Amer >60 >60 mL/min/1.73m2           ASSESSMENT:      ICD-10-CM    1. Closed  fracture of both ankles with routine healing, subsequent encounter S82.891D     S82.892D    2. Hypotension, unspecified hypotension type I95.9    3. Neuropathy G62.9    4. Cognitive impairment R41.89        PLAN:      Blood pressures have improved.  No pain issues.  No neuropathy.  Has been participating in therapy.  Does see orthopedics on the 13th.  Electronically signed by: Michael Duane Johnson, CNP

## 2021-06-20 NOTE — LETTER
Letter by Johnson, Michael Duane, CNP at      Author: Johnson, Michael Duane, CNP Service: -- Author Type: --    Filed:  Encounter Date: 2/25/2020 Status: (Other)         Patient: Kat Up   MR Number: [id number]   YOB: 1935   Date of Visit: 2/25/2020     [facility]    Facility:   [facility] [[id number]]   Code Status: DNR      CHIEF COMPLAINT/REASON FOR VISIT:  Chief Complaint   Patient presents with   ? Follow Up     rehab, ankles       HISTORY:      HPI: Kat is a 84 y.o. female who I had the opportunity to revisit with secondary to her hospitalization December 29, 2019 through January 1, 2020 secondary to a syncopal episode and sustaining bilateral ankle fractures.  She recently did see orthopedics on February 19 she can weight-bear as tolerated in her cam boots.  Not having any pain.  No neuropathy.  Recently decreased her gabapentin 600 mg twice daily.  Recently did a lipid panel couple weeks ago she is on lovastatin.  Appetite good.  Does have cognitive impairment is on Aricept.  No bowel or bladder issues.  No pain and no Tylenol as needed.  I do not have any clue and no updated notes on how she is doing from a therapy standpoint but I am assuming they are going to keep her here at least another month or so.  She did not have any other questions or other concerns.  Her  does come and visit her on the transitional care unit.    Past Medical History:   Diagnosis Date   ? Dementia (H)    ? Hypotension    ? Neuropathy              No family history on file.  Social History     Socioeconomic History   ? Marital status:      Spouse name: Not on file   ? Number of children: Not on file   ? Years of education: Not on file   ? Highest education level: Not on file   Occupational History   ? Not on file   Social Needs   ? Financial resource strain: Not on file   ? Food insecurity:     Worry: Not on file     Inability: Not on file    ? Transportation needs:     Medical: Not on file     Non-medical: Not on file   Tobacco Use   ? Smoking status: Never Smoker   ? Smokeless tobacco: Never Used   Substance and Sexual Activity   ? Alcohol use: Not Currently   ? Drug use: Never   ? Sexual activity: Not Currently   Lifestyle   ? Physical activity:     Days per week: Not on file     Minutes per session: Not on file   ? Stress: Not on file   Relationships   ? Social connections:     Talks on phone: Not on file     Gets together: Not on file     Attends Oriental orthodox service: Not on file     Active member of club or organization: Not on file     Attends meetings of clubs or organizations: Not on file     Relationship status: Not on file   ? Intimate partner violence:     Fear of current or ex partner: Not on file     Emotionally abused: Not on file     Physically abused: Not on file     Forced sexual activity: Not on file   Other Topics Concern   ? Not on file   Social History Narrative   ? Not on file         Review of Systems  Currently she denies chills and fever coughing wheezing chest pain dizziness or vertigo nausea vomiting diarrhea dysuria flulike symptoms headache or stiff neck.  History of cognitive impairment hyperlipidemia and syncopal episodes and most recently bilateral ankle fracture secondary to a fall.  Neuropathic pain.        Current Outpatient Medications:   ?  acetaminophen (TYLENOL) 500 MG tablet, Take 1 tablet (500 mg total) by mouth every 6 (six) hours as needed for pain or fever., Disp: , Rfl: 0  ?  aspirin 81 mg chewable tablet, Chew 1 tablet (81 mg total) 2 (two) times a day., Disp: , Rfl: 0  ?  bisacodyl (DULCOLAX) 10 mg suppository, Insert suppository rectally daily as needed for treatment of constipation., Disp: , Rfl: 0  ?  donepezil (ARICEPT) 10 MG tablet, Take 10 mg by mouth at bedtime., Disp: , Rfl:   ?  gabapentin (NEURONTIN) 600 MG tablet, Take 600 mg by mouth 2 (two) times a day. , Disp: , Rfl:   ?  magnesium hydroxide  (MILK OF MAG) 400 mg/5 mL Susp suspension, Take 30 mL by mouth daily as needed., Disp: , Rfl: 0  ?  polyethylene glycol (MIRALAX) 17 gram packet, Take 1 packet (17 g total) by mouth daily as needed., Disp: , Rfl: 0  ?  polyvinyl alcohol (LIQUIFILM TEARS) 1.4 % ophthalmic solution, Apply 1 drop to eye as needed for dry eyes., Disp: , Rfl:   ?  rosuvastatin (CRESTOR) 10 MG tablet, Take 10 mg by mouth daily. , Disp: , Rfl:   There were no vitals filed for this visit.  Blood pressure 114/67 pulse 71 respirations 18 temperature 98.4 saturation room air 96%      Physical Exam  Head is normocephalic.     Lungs are clear throughout.  Cardiovascular is normal without murmurs.  No lower extremity edema.  Gastrointestinal nondistended.  Musculoskeletal moves upper extremities.  Bilateral lower extremities in Cam boots.  Positive CMS to her feet as well as popliteal pulses.  Psychiatric Pleasant affect.  Does have cognitive impairment.       LABS:     Lab Results   Component Value Date    CHOL 132 02/12/2020     Lab Results   Component Value Date    HDL 39 (L) 02/12/2020     Lab Results   Component Value Date    LDLCALC 56 02/12/2020     Lab Results   Component Value Date    TRIG 185 (H) 02/12/2020     No components found for: CHOLHDL    ASSESSMENT:      ICD-10-CM    1. Closed fracture of both ankles with routine healing, subsequent encounter S82.891D     S82.892D    2. Cognitive impairment R41.89    3. Neuropathy G62.9    4. Hypotension, unspecified hypotension type I95.9        PLAN:    .Overall doing well no problems no other concerns no questions  Continue to work with therapy department.  Blood pressures have been good appetite is good.  No colds or flus.      Electronically signed by: Michael Duane Johnson, CNP

## 2021-06-20 NOTE — LETTER
Letter by Johnson, Michael Duane, CNP at      Author: Johnson, Michael Duane, CNP Service: -- Author Type: --    Filed:  Encounter Date: 1/21/2020 Status: Signed         Patient: Kat Up   MR Number: [id number]   YOB: 1935   Date of Visit: 1/21/2020     [facility]    Facility:   [facility] [[id number]]   Code Status: DNR      CHIEF COMPLAINT/REASON FOR VISIT:  Chief Complaint   Patient presents with   ? Follow Up     rehab, ankles       HISTORY:      HPI: Kat is a 84 y.o. female who is still in the transitional care unit secondary to her hospitalization December 29, 2019 through January 1, 2020 secondary to syncope and acute bilateral ankle fractures.  She did have a chance to see orthopedics on the 15th and next visit is on the 29th.  Pretty much bedbound and no ambulation or nonweightbearing at this time.  She does have a history of chronic pain which is not an issue at this time.  Had a chance to visit with her  and her daughter.  Her daughter did come in to have a conversation today.  They do live in the assisted living facility on this campus.  Eventually the plan is for all of them to be able to go to the daughter's home and live there with basic services.  Looking at her blood pressures systolically they have all been less than 107 so we will discontinue the Midrin 5 mg 3 times daily we been doing a dose reduction on that medication.  She is on Aricept and gabapentin.  No bowel issues.  Appetite good.  Does seem to be in good spirits today.  In talking about her goals and plan with the daughter and the  today they will figure out more information once they see the surgeon on the 29th and then at that point when she gets some weightbearing status they like to bring her to the house and have home therapy.  Otherwise there were no other questions or concerns.    Past Medical History:   Diagnosis Date   ? Dementia (H)    ?  Hypotension    ? Neuropathy              No family history on file.  Social History     Socioeconomic History   ? Marital status:      Spouse name: Not on file   ? Number of children: Not on file   ? Years of education: Not on file   ? Highest education level: Not on file   Occupational History   ? Not on file   Social Needs   ? Financial resource strain: Not on file   ? Food insecurity:     Worry: Not on file     Inability: Not on file   ? Transportation needs:     Medical: Not on file     Non-medical: Not on file   Tobacco Use   ? Smoking status: Never Smoker   ? Smokeless tobacco: Never Used   Substance and Sexual Activity   ? Alcohol use: Not Currently   ? Drug use: Never   ? Sexual activity: Not Currently   Lifestyle   ? Physical activity:     Days per week: Not on file     Minutes per session: Not on file   ? Stress: Not on file   Relationships   ? Social connections:     Talks on phone: Not on file     Gets together: Not on file     Attends Advent service: Not on file     Active member of club or organization: Not on file     Attends meetings of clubs or organizations: Not on file     Relationship status: Not on file   ? Intimate partner violence:     Fear of current or ex partner: Not on file     Emotionally abused: Not on file     Physically abused: Not on file     Forced sexual activity: Not on file   Other Topics Concern   ? Not on file   Social History Narrative   ? Not on file         Review of Systems  Currently she denies chills and fever coughing wheezing chest pain dizziness or vertigo nausea vomiting diarrhea dysuria flulike symptoms headache or stiff neck.  History of cognitive impairment hyperlipidemia and syncopal episodes and most recently bilateral ankle fracture secondary to a fall.  Neuropathic pain.    Current Outpatient Medications:   ?  acetaminophen (TYLENOL) 500 MG tablet, Take 1 tablet (500 mg total) by mouth every 6 (six) hours as needed for pain or fever., Disp: , Rfl: 0  ?   aspirin 81 mg chewable tablet, Chew 1 tablet (81 mg total) 2 (two) times a day., Disp: , Rfl: 0  ?  bisacodyl (DULCOLAX) 10 mg suppository, Insert suppository rectally daily as needed for treatment of constipation., Disp: , Rfl: 0  ?  donepezil (ARICEPT) 10 MG tablet, Take 10 mg by mouth at bedtime., Disp: , Rfl:   ?  gabapentin (NEURONTIN) 600 MG tablet, Take 700 mg by mouth 2 (two) times a day. , Disp: , Rfl:   ?  magnesium hydroxide (MILK OF MAG) 400 mg/5 mL Susp suspension, Take 30 mL by mouth daily as needed., Disp: , Rfl: 0  ?  polyethylene glycol (MIRALAX) 17 gram packet, Take 1 packet (17 g total) by mouth daily as needed., Disp: , Rfl: 0  ?  polyvinyl alcohol (LIQUIFILM TEARS) 1.4 % ophthalmic solution, Apply 1 drop to eye as needed for dry eyes., Disp: , Rfl:   ?  rosuvastatin (CRESTOR) 10 MG tablet, Take 10 mg by mouth daily. , Disp: , Rfl:     There were no vitals filed for this visit.  Blood pressure 98/63 pulse 65 respirations 14 temperature 97.2  Physical Exam  Head is normocephalic.    Neck is supple without adenopathy.  Lungs are clear throughout.  Cardiovascular is normal without murmurs.  No lower extremity edema.  Gastrointestinal nondistended.  Musculoskeletal moves upper extremities.  Bilateral lower extremities in Cam boots.  Positive CMS to her feet as well as popliteal pulses.  Psychiatric Pleasant affect.  Does have cognitive impairment.          LABS:   Lab Results   Component Value Date    WBC 7.5 12/30/2019    HGB 11.3 (L) 12/30/2019    HCT 39.1 12/29/2019    MCV 93 12/29/2019     12/29/2019         ASSESSMENT:      ICD-10-CM    1. Hypotension, unspecified hypotension type I95.9    2. Closed fracture of both ankles with routine healing, subsequent encounter S82.891D     S82.892D    3. Cognitive impairment R41.89    4. Chronic bilateral low back pain without sciatica M54.5     G89.29        PLAN:    Her exam is unremarkable.  No pain issues on gabapentin.  I will discontinue the  Midrin due to systolic blood pressures less than 107 over the past week plus.  Had a chance to speak with her  and daughter about the care as well as her ankles and the follow-up again with orthopedics later this month.  They agree with the current plan of care.  Did not have any other questions.    For documentation purposes total visit 35 minutes which over 50% was spent with the patient and family going over her care her medications blood pressures her stay on the transitional care unit and coordination of care.    Electronically signed by: Michael Duane Johnson, CNP

## 2021-06-20 NOTE — LETTER
Letter by Johnson, Michael Duane, CNP at      Author: Johnson, Michael Duane, CNP Service: -- Author Type: --    Filed:  Encounter Date: 2/27/2020 Status: (Other)         Patient: Kat Up   MR Number: [id number]   YOB: 1935   Date of Visit: 2/27/2020     [facility]    Facility:   [facility] [[id number]]   Code Status: DNR      CHIEF COMPLAINT/REASON FOR VISIT:  Chief Complaint   Patient presents with   ? Follow Up     rehab, ankles.       HISTORY:      HPI: Kat is a 85 y.o. female who I had the chance to revisit with secondary to her hospitalization December 29, 2019 through January 1, 2020 secondary syncopal episode and did sustain bilateral ankle fractures.  Currently enrolled in therapy.  Recently did have a visit with orthopedics on the 24th she is now weightbearing as tolerated in a cam boots.  According to therapy they want to keep her here another 3 weeks she is ambulating about 50 feet.  Also we had a chance to talk about her birthday yesterday she is now 85 years old.  She has a nice bouquet of flowers as well as cookies in the room.  The  did bring in some alcohol on apparently the therapy group has been aware that she has been having a night cap for the last couple of weeks of wine or champagne type of product and so yesterday I did receive a phone call asking if that could be on her order list as well since she is already been doing this and this is a part of her normal home routine with her  so we will go ahead and give her the okay for a glass of wine or champagne every night as needed since this is her home routine.  Currently though she has been normotensive and afebrile and also on room air.  Her ankles are nontender.  No neuropathy.  Getting extra nutrient supplements.  Couple weeks so did her cholesterol check see results below.  Not having any other complaints or problems.    Past Medical History:    Diagnosis Date   ? Dementia (H)    ? Hypotension    ? Neuropathy              No family history on file.  Social History     Socioeconomic History   ? Marital status:      Spouse name: Not on file   ? Number of children: Not on file   ? Years of education: Not on file   ? Highest education level: Not on file   Occupational History   ? Not on file   Social Needs   ? Financial resource strain: Not on file   ? Food insecurity:     Worry: Not on file     Inability: Not on file   ? Transportation needs:     Medical: Not on file     Non-medical: Not on file   Tobacco Use   ? Smoking status: Never Smoker   ? Smokeless tobacco: Never Used   Substance and Sexual Activity   ? Alcohol use: Not Currently   ? Drug use: Never   ? Sexual activity: Not Currently   Lifestyle   ? Physical activity:     Days per week: Not on file     Minutes per session: Not on file   ? Stress: Not on file   Relationships   ? Social connections:     Talks on phone: Not on file     Gets together: Not on file     Attends Spiritism service: Not on file     Active member of club or organization: Not on file     Attends meetings of clubs or organizations: Not on file     Relationship status: Not on file   ? Intimate partner violence:     Fear of current or ex partner: Not on file     Emotionally abused: Not on file     Physically abused: Not on file     Forced sexual activity: Not on file   Other Topics Concern   ? Not on file   Social History Narrative   ? Not on file         Review of Systems  Currently she denies chills and fever coughing wheezing chest pain dizziness or vertigo nausea vomiting diarrhea dysuria flulike symptoms headache or stiff neck.  History of cognitive impairment hyperlipidemia and syncopal episodes and most recently bilateral ankle fracture secondary to a fall.  Neuropathic pain.      Current Outpatient Medications:   ?  acetaminophen (TYLENOL) 500 MG tablet, Take 1 tablet (500 mg total) by mouth every 6 (six) hours as  needed for pain or fever., Disp: , Rfl: 0  ?  aspirin 81 mg chewable tablet, Chew 1 tablet (81 mg total) 2 (two) times a day., Disp: , Rfl: 0  ?  bisacodyl (DULCOLAX) 10 mg suppository, Insert suppository rectally daily as needed for treatment of constipation., Disp: , Rfl: 0  ?  donepezil (ARICEPT) 10 MG tablet, Take 10 mg by mouth at bedtime., Disp: , Rfl:   ?  gabapentin (NEURONTIN) 600 MG tablet, Take 600 mg by mouth 2 (two) times a day. , Disp: , Rfl:   ?  magnesium hydroxide (MILK OF MAG) 400 mg/5 mL Susp suspension, Take 30 mL by mouth daily as needed., Disp: , Rfl: 0  ?  polyethylene glycol (MIRALAX) 17 gram packet, Take 1 packet (17 g total) by mouth daily as needed., Disp: , Rfl: 0  ?  polyvinyl alcohol (LIQUIFILM TEARS) 1.4 % ophthalmic solution, Apply 1 drop to eye as needed for dry eyes., Disp: , Rfl:   ?  rosuvastatin (CRESTOR) 10 MG tablet, Take 10 mg by mouth daily. , Disp: , Rfl:     There were no vitals filed for this visit.  Blood pressure 99/61 pulse 77 respirations 18 temperature 97.6 saturation room air 96%  Physical Exam  Head is normocephalic.     Lungs are clear throughout.  Cardiovascular is normal without murmurs.  No lower extremity edema.  Gastrointestinal nondistended.  Musculoskeletal moves upper extremities.  Bilateral lower extremities in Cam boots.  Positive CMS to her feet as well as popliteal pulses.  Psychiatric Pleasant affect.  Does have cognitive impairment.        LABS:   Lab Results   Component Value Date    WBC 7.5 12/30/2019    HGB 11.3 (L) 12/30/2019    HCT 39.1 12/29/2019    MCV 93 12/29/2019     12/29/2019     Lab Results   Component Value Date    CHOL 132 02/12/2020     Lab Results   Component Value Date    HDL 39 (L) 02/12/2020     Lab Results   Component Value Date    LDLCALC 56 02/12/2020     Lab Results   Component Value Date    TRIG 185 (H) 02/12/2020     No components found for: CHOLHDL      ASSESSMENT:      ICD-10-CM    1. Hypotension, unspecified  hypotension type I95.9    2. Neuropathy G62.9    3. Closed fracture of both ankles with routine healing, subsequent encounter S82.891D     S82.892D    4. Chronic bilateral low back pain without sciatica M54.5     G89.29        PLAN:    Continue to manage and follow her chronic medical conditions.  Once again therapy wants to keep her another 3 weeks with ambulation and working with her strength.  Otherwise we will give the okay for night cap with her  since this is part of her normal routine.      Electronically signed by: Michael Duane Johnson, CNP

## 2021-06-20 NOTE — LETTER
Letter by Johnson, Michael Duane, CNP at      Author: Johnson, Michael Duane, CNP Service: -- Author Type: --    Filed:  Encounter Date: 3/10/2020 Status: (Other)         Patient: Kat Up   MR Number: [id number]   YOB: 1935   Date of Visit: 3/10/2020     [facility]    Facility:   [facility] [[id number]]   Code Status: DNR      CHIEF COMPLAINT/REASON FOR VISIT:  Chief Complaint   Patient presents with   ? Follow Up     rehab, ankles, htn,       HISTORY:      HPI: Kat is a 85 y.o. female who I had the opportunity to revisit with secondary to her hospitalization December 29, 2019 through January 1, 2020 secondary to bilateral ankle fractures.  Currently has been participating in therapy.  Not having any discomfort.  The neuropathy is also well controlled on the gabapentin.  Not requiring any Tylenol as needed for pain.  Is actually independently ambulating in her room at this time has been given full weightbearing.  Getting extra nutrient supplements.  Normotensive afebrile and also on room air.  Good appetite.  Sleeping well at night    Past Medical History:   Diagnosis Date   ? Dementia (H)    ? Hypotension    ? Neuropathy              No family history on file.  Social History     Socioeconomic History   ? Marital status:      Spouse name: Not on file   ? Number of children: Not on file   ? Years of education: Not on file   ? Highest education level: Not on file   Occupational History   ? Not on file   Social Needs   ? Financial resource strain: Not on file   ? Food insecurity     Worry: Not on file     Inability: Not on file   ? Transportation needs     Medical: Not on file     Non-medical: Not on file   Tobacco Use   ? Smoking status: Never Smoker   ? Smokeless tobacco: Never Used   Substance and Sexual Activity   ? Alcohol use: Not Currently   ? Drug use: Never   ? Sexual activity: Not Currently   Lifestyle   ? Physical activity      Days per week: Not on file     Minutes per session: Not on file   ? Stress: Not on file   Relationships   ? Social connections     Talks on phone: Not on file     Gets together: Not on file     Attends Amish service: Not on file     Active member of club or organization: Not on file     Attends meetings of clubs or organizations: Not on file     Relationship status: Not on file   ? Intimate partner violence     Fear of current or ex partner: Not on file     Emotionally abused: Not on file     Physically abused: Not on file     Forced sexual activity: Not on file   Other Topics Concern   ? Not on file   Social History Narrative   ? Not on file         Review of Systems  Currently she denies chills and fever coughing wheezing chest pain dizziness or vertigo nausea vomiting diarrhea dysuria flulike symptoms headache or stiff neck.  History of cognitive impairment hyperlipidemia and syncopal episodes and most recently bilateral ankle fracture secondary to a fall.  Neuropathic pain.       Current Outpatient Medications   Medication Sig   ? acetaminophen (TYLENOL) 500 MG tablet Take 1 tablet (500 mg total) by mouth every 6 (six) hours as needed for pain or fever.   ? aspirin 81 mg chewable tablet Chew 1 tablet (81 mg total) 2 (two) times a day.   ? bisacodyl (DULCOLAX) 10 mg suppository Insert suppository rectally daily as needed for treatment of constipation.   ? donepezil (ARICEPT) 10 MG tablet Take 10 mg by mouth at bedtime.   ? gabapentin (NEURONTIN) 600 MG tablet Take 600 mg by mouth 2 (two) times a day.    ? magnesium hydroxide (MILK OF MAG) 400 mg/5 mL Susp suspension Take 30 mL by mouth daily as needed.   ? polyethylene glycol (MIRALAX) 17 gram packet Take 1 packet (17 g total) by mouth daily as needed.   ? polyvinyl alcohol (LIQUIFILM TEARS) 1.4 % ophthalmic solution Apply 1 drop to eye as needed for dry eyes.   ? rosuvastatin (CRESTOR) 10 MG tablet Take 10 mg by mouth daily.        There were no vitals  filed for this visit.  Blood pressure 119/75 pulse 64 respirations 16 temperature 98.4 saturation room air 96%  Physical Exam  Head is normocephalic.     Lungs are clear throughout.  Cardiovascular is normal without murmurs.  No lower extremity edema.  Gastrointestinal nondistended.  Musculoskeletal ;denies discomfort.  Bilateral lower extremities in Cam boots.  Positive CMS to her feet as well as popliteal pulses.  Psychiatric Pleasant affect.  Does have cognitive impairment.         LABS:   Lab Results   Component Value Date    WBC 7.5 12/30/2019    HGB 11.3 (L) 12/30/2019    HCT 39.1 12/29/2019    MCV 93 12/29/2019     12/29/2019         ASSESSMENT:      ICD-10-CM    1. Closed fracture of both ankles with routine healing, subsequent encounter  S82.891D     S82.892D    2. Hypotension, unspecified hypotension type  I95.9    3. Cognitive impairment  R41.89    4. Neuropathy  G62.9        PLAN    No further changes at this time regarding medications as well as the rehabilitation process.  Do not see any recent notes regarding the future plans.  I know eventually in talking with the daughter that they will be eventually going to her house and not going back to the assisted living facility.      Electronically signed by: Michael Duane Johnson, CNP

## 2021-06-20 NOTE — LETTER
Letter by Johnson, Michael Duane, CNP at      Author: Johnson, Michael Duane, CNP Service: -- Author Type: --    Filed:  Encounter Date: 2/20/2020 Status: (Other)         Patient: Kat Up   MR Number: [id number]   YOB: 1935   Date of Visit: 2/20/2020     [facility]    Facility:   [facility] [[id number]]   Code Status: DNR      CHIEF COMPLAINT/REASON FOR VISIT:  Chief Complaint   Patient presents with   ? Follow Up     bilat ankle fx       HISTORY:      HPI: Kat is a 84 y.o. female who is still in the transitional care unit secondary to her hospitalization December 29, 2019 through January 1, 2020 secondary to bilateral ankle fractures.  She recently did have an opportunity to visit with the orthopedic group and they did allow some new orders to do some weightbearing as tolerated from her appointment on February 19 and has a follow-up in 2 weeks.  Also supposed to do some range of motion.  She currently has been normotensive and afebrile.  No hypotensive episodes.  Her appetite is been appropriate.  The neuropathy is also well contained with just Neurontin 600 mg twice daily.  Also rechecked her cholesterol see results below.    Past Medical History:   Diagnosis Date   ? Dementia (H)    ? Hypotension    ? Neuropathy              No family history on file.  Social History     Socioeconomic History   ? Marital status:      Spouse name: Not on file   ? Number of children: Not on file   ? Years of education: Not on file   ? Highest education level: Not on file   Occupational History   ? Not on file   Social Needs   ? Financial resource strain: Not on file   ? Food insecurity:     Worry: Not on file     Inability: Not on file   ? Transportation needs:     Medical: Not on file     Non-medical: Not on file   Tobacco Use   ? Smoking status: Never Smoker   ? Smokeless tobacco: Never Used   Substance and Sexual Activity   ? Alcohol use: Not  Currently   ? Drug use: Never   ? Sexual activity: Not Currently   Lifestyle   ? Physical activity:     Days per week: Not on file     Minutes per session: Not on file   ? Stress: Not on file   Relationships   ? Social connections:     Talks on phone: Not on file     Gets together: Not on file     Attends [employer] service: Not on file     Active member of club or organization: Not on file     Attends meetings of clubs or organizations: Not on file     Relationship status: Not on file   ? Intimate partner violence:     Fear of current or ex partner: Not on file     Emotionally abused: Not on file     Physically abused: Not on file     Forced sexual activity: Not on file   Other Topics Concern   ? Not on file   Social History Narrative   ? Not on file         Review of Systems  Currently she denies chills and fever coughing wheezing chest pain dizziness or vertigo nausea vomiting diarrhea dysuria flulike symptoms headache or stiff neck.  History of cognitive impairment hyperlipidemia and syncopal episodes and most recently bilateral ankle fracture secondary to a fall.  Neuropathic pain.        Current Outpatient Medications:   ?  acetaminophen (TYLENOL) 500 MG tablet, Take 1 tablet (500 mg total) by mouth every 6 (six) hours as needed for pain or fever., Disp: , Rfl: 0  ?  aspirin 81 mg chewable tablet, Chew 1 tablet (81 mg total) 2 (two) times a day., Disp: , Rfl: 0  ?  bisacodyl (DULCOLAX) 10 mg suppository, Insert suppository rectally daily as needed for treatment of constipation., Disp: , Rfl: 0  ?  donepezil (ARICEPT) 10 MG tablet, Take 10 mg by mouth at bedtime., Disp: , Rfl:   ?  gabapentin (NEURONTIN) 600 MG tablet, Take 600 mg by mouth 2 (two) times a day. , Disp: , Rfl:   ?  magnesium hydroxide (MILK OF MAG) 400 mg/5 mL Susp suspension, Take 30 mL by mouth daily as needed., Disp: , Rfl: 0  ?  polyethylene glycol (MIRALAX) 17 gram packet, Take 1 packet (17 g total) by mouth daily as needed., Disp: , Rfl:  0  ?  polyvinyl alcohol (LIQUIFILM TEARS) 1.4 % ophthalmic solution, Apply 1 drop to eye as needed for dry eyes., Disp: , Rfl:   ?  rosuvastatin (CRESTOR) 10 MG tablet, Take 10 mg by mouth daily. , Disp: , Rfl:   There were no vitals filed for this visit.  Blood pressure 119/65 pulse 65 respirations 18 temperature 98.1 saturation room air 96%  Physical Exam  Head is normocephalic.     Lungs are clear throughout.  Cardiovascular is normal without murmurs.  No lower extremity edema.  Gastrointestinal nondistended.  Musculoskeletal moves upper extremities.  Bilateral lower extremities in Cam boots.  Positive CMS to her feet as well as popliteal pulses.  Psychiatric Pleasant affect.  Does have cognitive impairment.     LABS:   Lab Results   Component Value Date    CHOL 132 02/12/2020     Lab Results   Component Value Date    HDL 39 (L) 02/12/2020     Lab Results   Component Value Date    LDLCALC 56 02/12/2020     Lab Results   Component Value Date    TRIG 185 (H) 02/12/2020     No components found for: CHOLHDL      ASSESSMENT:      ICD-10-CM    1. Hypotension, unspecified hypotension type I95.9    2. Cognitive impairment R41.89    3. Closed fracture of both ankles with routine healing, subsequent encounter S82.891D     S82.892D    4. Chronic bilateral low back pain without sciatica M54.5     G89.29        PLAN:    No other new changes at this time.  Recently did see orthopedics and they did give her weightbearing as tolerated.  We will see what therapy says in the future.  There is a follow-up with orthopedics in about 2 weeks.      Electronically signed by: Michael Duane Johnson, CNP

## 2021-06-20 NOTE — LETTER
Letter by Johnson, Michael Duane, CNP at      Author: Johnson, Michael Duane, CNP Service: -- Author Type: --    Filed:  Encounter Date: 1/7/2020 Status: Signed         Patient: Kat Up   MR Number: [id number]   YOB: 1935   Date of Visit: 1/7/2020     [facility]    Facility:   [facility] [[id number]]   Code Status: DNR      CHIEF COMPLAINT/REASON FOR VISIT:  Chief Complaint   Patient presents with   ? Follow Up     rehab, ankles       HISTORY:      HPI: Kat is a 84 y.o. female who is in the transitional care unit secondary to her hospitalization December 29, 2019 through January 1, 2020 secondary to syncope with multiple falls along with acute bilateral ankle fractures.  She currently is in the transitional care unit does have cam boots on.  Next visit orthopedics is on January 13.  Nonweightbearing.  Her systolic blood pressures over the past week have been running anywhere from 101-113 systolically and she is on Midodrin 10 mg 3 times daily with a history of falls and knows if it is hypotension I will decrease the Midrin 5 mg 3 times a day and continue to monitor.  Also pharmacy wanted to decrease her gabapentin from 600 mg 3 times daily to 700 mg twice daily so I will also be done today.  She is on Aricept for cognition.  Appetite is good.  Her pain seems to be managed has not received any Tylenol as needed.  Bowels are regular.    Past Medical History:   Diagnosis Date   ? Dementia (H)    ? Hypotension    ? Neuropathy              No family history on file.  Social History     Socioeconomic History   ? Marital status:      Spouse name: Not on file   ? Number of children: Not on file   ? Years of education: Not on file   ? Highest education level: Not on file   Occupational History   ? Not on file   Social Needs   ? Financial resource strain: Not on file   ? Food insecurity:     Worry: Not on file     Inability: Not on file   ?  Transportation needs:     Medical: Not on file     Non-medical: Not on file   Tobacco Use   ? Smoking status: Never Smoker   ? Smokeless tobacco: Never Used   Substance and Sexual Activity   ? Alcohol use: Not Currently   ? Drug use: Never   ? Sexual activity: Not Currently   Lifestyle   ? Physical activity:     Days per week: Not on file     Minutes per session: Not on file   ? Stress: Not on file   Relationships   ? Social connections:     Talks on phone: Not on file     Gets together: Not on file     Attends Bahai service: Not on file     Active member of club or organization: Not on file     Attends meetings of clubs or organizations: Not on file     Relationship status: Not on file   ? Intimate partner violence:     Fear of current or ex partner: Not on file     Emotionally abused: Not on file     Physically abused: Not on file     Forced sexual activity: Not on file   Other Topics Concern   ? Not on file   Social History Narrative   ? Not on file         Review of Systems  Currently she denies chills and fever coughing wheezing chest pain dizziness or vertigo nausea vomiting diarrhea dysuria flulike symptoms headache or stiff neck.  History of cognitive impairment hyperlipidemia and syncopal episodes and most recently bilateral ankle fracture secondary to a fall.  Neuropathic pain.    Current Outpatient Medications:   ?  acetaminophen (TYLENOL) 500 MG tablet, Take 1 tablet (500 mg total) by mouth every 6 (six) hours as needed for pain or fever., Disp: , Rfl: 0  ?  aspirin 81 mg chewable tablet, Chew 1 tablet (81 mg total) 2 (two) times a day., Disp: , Rfl: 0  ?  bisacodyl (DULCOLAX) 10 mg suppository, Insert suppository rectally daily as needed for treatment of constipation., Disp: , Rfl: 0  ?  donepezil (ARICEPT) 10 MG tablet, Take 10 mg by mouth at bedtime., Disp: , Rfl:   ?  gabapentin (NEURONTIN) 600 MG tablet, Take 700 mg by mouth 2 (two) times a day. , Disp: , Rfl:   ?  magnesium hydroxide (MILK OF  MAG) 400 mg/5 mL Susp suspension, Take 30 mL by mouth daily as needed., Disp: , Rfl: 0  ?  midodrine (PROAMATINE) 10 MG tablet, Take 5 mg by mouth 3 (three) times a day with meals. , Disp: , Rfl:   ?  polyethylene glycol (MIRALAX) 17 gram packet, Take 1 packet (17 g total) by mouth daily as needed., Disp: , Rfl: 0  ?  polyvinyl alcohol (LIQUIFILM TEARS) 1.4 % ophthalmic solution, Apply 1 drop to eye as needed for dry eyes., Disp: , Rfl:   ?  rosuvastatin (CRESTOR) 10 MG tablet, Take 10 mg by mouth daily. , Disp: , Rfl:     There were no vitals filed for this visit.  Blood pressure 113/76 pulse 82 respirations 18 saturation room air 95%  Physical Exam  Head is normocephalic.  Conjunctiva is pink sclerae clear.  Neck is supple without adenopathy.  Lungs are clear throughout.  Cardiovascular is normal without murmurs.  No lower extremity edema.  Gastrointestinal slightly protuberant nontender nondistended.  Musculoskeletal moves upper extremities.  Bilateral lower extremities in Cam boots.  Positive CMS to her feet as well as popliteal pulses.  Psychiatric Pleasant affect.  Does have cognitive impairment.     LABS:   Results for orders placed or performed during the hospital encounter of 12/29/19   Basic metabolic panel   Result Value Ref Range    Sodium 140 136 - 145 mmol/L    Potassium 4.2 3.5 - 5.0 mmol/L    Chloride 107 98 - 107 mmol/L    CO2 25 22 - 31 mmol/L    Anion Gap, Calculation 8 5 - 18 mmol/L    Glucose 96 70 - 125 mg/dL    Calcium 9.1 8.5 - 10.5 mg/dL    BUN 19 8 - 28 mg/dL    Creatinine 0.87 0.60 - 1.10 mg/dL    GFR MDRD Af Amer >60 >60 mL/min/1.73m2    GFR MDRD Non Af Amer >60 >60 mL/min/1.73m2       Lab Results   Component Value Date    WBC 7.5 12/30/2019    HGB 11.3 (L) 12/30/2019    HCT 39.1 12/29/2019    MCV 93 12/29/2019     12/29/2019     Lab Results   Component Value Date    ALT 18 12/29/2019    AST 25 12/29/2019    ALKPHOS 78 12/29/2019    BILITOT 0.4 12/29/2019     No results found for:  CHOL  No results found for: HDL  No results found for: LDLCALC  No results found for: TRIG  No components found for: CHOLHDL      ASSESSMENT:      ICD-10-CM    1. Hypotension, unspecified hypotension type I95.9    2. Neuropathy G62.9    3. Closed fracture of both ankles with routine healing, subsequent encounter S82.891D     S82.892D    4. Cognitive impairment R41.89        PLAN:    Decreasing Midrin 5 mg 3 times daily and decreasing Neurontin per pharmacy recommendation 700 mg twice daily rather than 600 mg 3 times daily.  Her pain is also managed.  Continue to monitor blood pressures.  Follow with orthopedics on January 13.        Electronically signed by: Michael Duane Johnson, CNP

## 2021-06-25 NOTE — TELEPHONE ENCOUNTER
Faxed refill request received from [facility] for Escitalopram 10 mg    Requested Prescriptions     Pending Prescriptions Disp Refills     escitalopram oxalate (LEXAPRO) 10 MG tablet 30 tablet 2     Sig: Take 1 tablet (10 mg total) by mouth daily.

## 2021-06-30 ENCOUNTER — RECORDS - HEALTHEAST (OUTPATIENT)
Dept: INTERNAL MEDICINE | Facility: CLINIC | Age: 86
End: 2021-06-30

## 2021-07-04 NOTE — TELEPHONE ENCOUNTER
Telephone Encounter by Ayse Cornejo MD at 6/30/2021  2:47 PM     Author: Ayse Cornejo MD Service: -- Author Type: Physician    Filed: 6/30/2021  2:47 PM Encounter Date: 6/30/2021 Status: Signed    : Ayse Cornejo MD (Physician)       She can be added on Friday

## 2021-09-10 ENCOUNTER — OFFICE VISIT (OUTPATIENT)
Dept: INTERNAL MEDICINE | Facility: CLINIC | Age: 86
End: 2021-09-10
Payer: COMMERCIAL

## 2021-09-10 VITALS
HEART RATE: 68 BPM | BODY MASS INDEX: 31.5 KG/M2 | DIASTOLIC BLOOD PRESSURE: 72 MMHG | WEIGHT: 171.2 LBS | OXYGEN SATURATION: 96 % | SYSTOLIC BLOOD PRESSURE: 118 MMHG | HEIGHT: 62 IN

## 2021-09-10 DIAGNOSIS — F41.1 GENERALIZED ANXIETY DISORDER: ICD-10-CM

## 2021-09-10 DIAGNOSIS — R40.0 DAYTIME SLEEPINESS: ICD-10-CM

## 2021-09-10 DIAGNOSIS — R25.1 TREMOR: ICD-10-CM

## 2021-09-10 DIAGNOSIS — G30.9 ALZHEIMER'S DEMENTIA WITHOUT BEHAVIORAL DISTURBANCE, UNSPECIFIED TIMING OF DEMENTIA ONSET: Primary | ICD-10-CM

## 2021-09-10 DIAGNOSIS — F02.80 ALZHEIMER'S DEMENTIA WITHOUT BEHAVIORAL DISTURBANCE, UNSPECIFIED TIMING OF DEMENTIA ONSET: Primary | ICD-10-CM

## 2021-09-10 DIAGNOSIS — M54.50 CHRONIC BILATERAL LOW BACK PAIN WITHOUT SCIATICA: ICD-10-CM

## 2021-09-10 DIAGNOSIS — R32: ICD-10-CM

## 2021-09-10 DIAGNOSIS — Z00.00 HEALTHCARE MAINTENANCE: ICD-10-CM

## 2021-09-10 DIAGNOSIS — G89.29 CHRONIC BILATERAL LOW BACK PAIN WITHOUT SCIATICA: ICD-10-CM

## 2021-09-10 PROBLEM — E78.5 HYPERLIPIDEMIA, UNSPECIFIED HYPERLIPIDEMIA TYPE: Status: ACTIVE | Noted: 2019-12-05

## 2021-09-10 PROCEDURE — 90670 PCV13 VACCINE IM: CPT | Performed by: INTERNAL MEDICINE

## 2021-09-10 PROCEDURE — 99214 OFFICE O/P EST MOD 30 MIN: CPT | Mod: 25 | Performed by: INTERNAL MEDICINE

## 2021-09-10 PROCEDURE — G0009 ADMIN PNEUMOCOCCAL VACCINE: HCPCS | Performed by: INTERNAL MEDICINE

## 2021-09-10 PROCEDURE — G0008 ADMIN INFLUENZA VIRUS VAC: HCPCS | Performed by: INTERNAL MEDICINE

## 2021-09-10 PROCEDURE — 90662 IIV NO PRSV INCREASED AG IM: CPT | Performed by: INTERNAL MEDICINE

## 2021-09-10 RX ORDER — ESCITALOPRAM OXALATE 10 MG/1
10 TABLET ORAL
COMMUNITY
Start: 2021-06-03 | End: 2022-01-01

## 2021-09-10 RX ORDER — GABAPENTIN 600 MG/1
600 TABLET ORAL
COMMUNITY
Start: 2021-06-03 | End: 2021-09-10

## 2021-09-10 RX ORDER — CALCIUM CARBONATE 500(1250)
500 TABLET,CHEWABLE ORAL 3 TIMES DAILY PRN
Status: ON HOLD | COMMUNITY
Start: 2021-03-09 | End: 2022-01-01

## 2021-09-10 RX ORDER — ASPIRIN 81 MG/1
81 TABLET, CHEWABLE ORAL DAILY
COMMUNITY
Start: 2020-01-01 | End: 2022-01-01

## 2021-09-10 RX ORDER — DONEPEZIL HYDROCHLORIDE 10 MG/1
10 TABLET, FILM COATED ORAL
COMMUNITY
Start: 2021-01-05 | End: 2021-11-01

## 2021-09-10 RX ORDER — GABAPENTIN 600 MG/1
600 TABLET ORAL DAILY
Qty: 90 TABLET | Refills: 3 | Status: SHIPPED | OUTPATIENT
Start: 2021-09-10 | End: 2022-01-01

## 2021-09-10 RX ORDER — ROSUVASTATIN CALCIUM 10 MG/1
10 TABLET, COATED ORAL
COMMUNITY
Start: 2021-01-05 | End: 2021-11-01

## 2021-09-10 RX ORDER — ACETAMINOPHEN 500 MG
500 TABLET ORAL EVERY 6 HOURS PRN
COMMUNITY
Start: 2021-03-09

## 2021-09-10 ASSESSMENT — MIFFLIN-ST. JEOR: SCORE: 1169.81

## 2021-09-10 NOTE — PROGRESS NOTES
Assess/plan  1. Alzheimer's dementia without behavioral disturbance, unspecified timing of dementia onset (H)  She forgets things within 5 minutes of telling her.  Needs repetitive direction.  But she is very responsive she is eager to listen.  Physically she has excellent balance.  No other concerns like swallowing or behavioral issues.  She says it is safe in her current setting with the supervision that her daughter gives her.    2. Healthcare maintenance  She had initially declined pneumococcal vaccine.  She did agree to it today and will get her Prevnar and flu shot.  She has had her COVID shot.  She should she is also due for tetanus but will defer that for next year.  3. Generalized anxiety disorder  Huge improvement with the addition of citalopram.    4. Chronic bilateral low back pain without sciatica  She seems to only get this after her morning walk.  I did tell her daughter to give her Tylenol prior to the walk.  However if she continues to get pain it is probably because she does not want to go for a walk and there is no real compelling reason for her to go if she does not want to go.    5. Mild incontinence  I do not want to add any medications to her regimen.  She does not really have significant urgency and uses depends.    6. Tremor  I do not appreciate a tremor and certainly does not seem to have any parkinsonian is.    7. Daytime sleepiness  I do believe this could be due to the gabapentin will discontinue the morning dose and she should take the evening dose later.    Otherwise she appears quite stable.    Ginette Stephens is a 86 year old woman with Alzheimer's who presents for the following health issues very pleasant patient accompanied by her daughter with the following issuesTremors in hands , mostly in the evening . Low back pain , during the walk , sleeping a lot .  These were voiced by her .  Is her primary caregiver.  She does live with the daughter and the daughter did not  "notice them herself.  She has had no falls no significant incontinence and does do her ADLs first most herself.  She is taking      Current Outpatient Medications   Medication     acetaminophen (TYLENOL) 500 MG tablet     aspirin (ASA) 81 MG chewable tablet     calcium carbonate 1250 (500 Ca) MG CHEW     cholecalciferol 50 MCG (2000 UT) tablet     donepezil (ARICEPT) 10 MG tablet     escitalopram (LEXAPRO) 10 MG tablet     gabapentin (NEURONTIN) 600 MG tablet     rosuvastatin (CRESTOR) 10 MG tablet     No current facility-administered medications for this visit.       HPI           Review of Systems   Constitutional, HEENT, cardiovascular, pulmonary, gi and gu systems are negative, except as otherwise noted.      Objective    /72 (BP Location: Left arm, Patient Position: Sitting, Cuff Size: Adult Regular)   Pulse 68   Ht 1.575 m (5' 2\")   Wt 77.7 kg (171 lb 3.2 oz)   SpO2 96%   BMI 31.31 kg/m    Body mass index is 31.31 kg/m .  Physical Exam   GENERAL: healthy, alert and no distress  NECK: no adenopathy, no asymmetry, masses, or scars and thyroid normal to palpation  RESP: lungs clear to auscultation - no rales, rhonchi or wheezes  CV: regular rate and rhythm, normal S1 S2, no S3 or S4, no murmur, click or rub, no peripheral edema and peripheral pulses strong      Romberg's is negative she can lift up her heels from the ground without falling        "

## 2021-09-10 NOTE — PATIENT INSTRUCTIONS
Stop gabapentin in the morning    take two tylenol before your walk . ( two tablets )   Take night time gabapentin before bed .

## 2021-11-01 ENCOUNTER — TELEPHONE (OUTPATIENT)
Dept: INTERNAL MEDICINE | Facility: CLINIC | Age: 86
End: 2021-11-01

## 2021-11-01 DIAGNOSIS — E78.5 HYPERLIPIDEMIA, UNSPECIFIED HYPERLIPIDEMIA TYPE: Primary | ICD-10-CM

## 2021-11-01 RX ORDER — ROSUVASTATIN CALCIUM 10 MG/1
10 TABLET, COATED ORAL DAILY
Qty: 90 TABLET | Refills: 3 | Status: SHIPPED | OUTPATIENT
Start: 2021-11-01 | End: 2022-01-01

## 2021-11-01 RX ORDER — DONEPEZIL HYDROCHLORIDE 10 MG/1
10 TABLET, FILM COATED ORAL AT BEDTIME
Qty: 90 TABLET | Refills: 3 | Status: SHIPPED | OUTPATIENT
Start: 2021-11-01 | End: 2022-01-01

## 2021-11-01 NOTE — TELEPHONE ENCOUNTER
Reason for Call:  Other call back    Detailed comments: pt out of medications for:  Aricept   Crestor    Pharm:  [facility] - [address]    Phone Number Patient can be reached at: Home number on file [phone] (home)    Best Time: anytime    Can we leave a detailed message on this number? YES    Call taken on 11/1/2021 at 9:45 AM by Esther Amin

## 2021-11-02 RX ORDER — DONEPEZIL HYDROCHLORIDE 10 MG/1
TABLET, FILM COATED ORAL
Qty: 90 TABLET | OUTPATIENT
Start: 2021-11-02

## 2021-11-02 RX ORDER — ROSUVASTATIN CALCIUM 10 MG/1
TABLET, COATED ORAL
Qty: 90 TABLET | OUTPATIENT
Start: 2021-11-02

## 2021-12-13 ENCOUNTER — MYC MEDICAL ADVICE (OUTPATIENT)
Dept: INTERNAL MEDICINE | Facility: CLINIC | Age: 86
End: 2021-12-13
Payer: MEDICARE

## 2021-12-13 DIAGNOSIS — Z20.822 ENCOUNTER FOR LABORATORY TESTING FOR COVID-19 VIRUS: Primary | ICD-10-CM

## 2022-01-01 ENCOUNTER — TELEPHONE (OUTPATIENT)
Dept: INTERNAL MEDICINE | Facility: CLINIC | Age: 87
End: 2022-01-01

## 2022-01-01 ENCOUNTER — HEALTH MAINTENANCE LETTER (OUTPATIENT)
Age: 87
End: 2022-01-01

## 2022-01-01 ENCOUNTER — PATIENT OUTREACH (OUTPATIENT)
Dept: NURSING | Facility: CLINIC | Age: 87
End: 2022-01-01
Payer: COMMERCIAL

## 2022-01-01 ENCOUNTER — MYC MEDICAL ADVICE (OUTPATIENT)
Dept: INTERNAL MEDICINE | Facility: CLINIC | Age: 87
End: 2022-01-01
Payer: COMMERCIAL

## 2022-01-01 ENCOUNTER — OFFICE VISIT (OUTPATIENT)
Dept: INTERNAL MEDICINE | Facility: CLINIC | Age: 87
End: 2022-01-01
Payer: COMMERCIAL

## 2022-01-01 ENCOUNTER — PATIENT OUTREACH (OUTPATIENT)
Dept: NURSING | Facility: CLINIC | Age: 87
End: 2022-01-01
Attending: INTERNAL MEDICINE
Payer: COMMERCIAL

## 2022-01-01 ENCOUNTER — PATIENT OUTREACH (OUTPATIENT)
Dept: CARE COORDINATION | Facility: CLINIC | Age: 87
End: 2022-01-01

## 2022-01-01 ENCOUNTER — HOSPITAL ENCOUNTER (OUTPATIENT)
Facility: HOSPITAL | Age: 87
Setting detail: OBSERVATION
Discharge: HOSPICE | End: 2022-12-11
Attending: FAMILY MEDICINE | Admitting: INTERNAL MEDICINE
Payer: COMMERCIAL

## 2022-01-01 ENCOUNTER — APPOINTMENT (OUTPATIENT)
Dept: CT IMAGING | Facility: HOSPITAL | Age: 87
End: 2022-01-01
Attending: FAMILY MEDICINE
Payer: COMMERCIAL

## 2022-01-01 ENCOUNTER — TRANSFERRED RECORDS (OUTPATIENT)
Dept: HEALTH INFORMATION MANAGEMENT | Facility: CLINIC | Age: 87
End: 2022-01-01

## 2022-01-01 ENCOUNTER — MEDICAL CORRESPONDENCE (OUTPATIENT)
Dept: HEALTH INFORMATION MANAGEMENT | Facility: CLINIC | Age: 87
End: 2022-01-01

## 2022-01-01 ENCOUNTER — HOSPITAL ENCOUNTER (INPATIENT)
Facility: HOSPITAL | Age: 87
LOS: 8 days | Discharge: HOME | End: 2022-12-19
Attending: FAMILY MEDICINE | Admitting: INTERNAL MEDICINE
Payer: COMMERCIAL

## 2022-01-01 ENCOUNTER — OFFICE VISIT (OUTPATIENT)
Dept: CARDIOLOGY | Facility: CLINIC | Age: 87
End: 2022-01-01
Payer: COMMERCIAL

## 2022-01-01 ENCOUNTER — PATIENT OUTREACH (OUTPATIENT)
Dept: NURSING | Facility: CLINIC | Age: 87
End: 2022-01-01

## 2022-01-01 VITALS
WEIGHT: 173.2 LBS | HEIGHT: 62 IN | DIASTOLIC BLOOD PRESSURE: 64 MMHG | BODY MASS INDEX: 31.87 KG/M2 | SYSTOLIC BLOOD PRESSURE: 100 MMHG | OXYGEN SATURATION: 97 % | RESPIRATION RATE: 16 BRPM | TEMPERATURE: 98.5 F | HEART RATE: 120 BPM

## 2022-01-01 VITALS
OXYGEN SATURATION: 98 % | HEART RATE: 108 BPM | WEIGHT: 178 LBS | TEMPERATURE: 97.8 F | DIASTOLIC BLOOD PRESSURE: 74 MMHG | BODY MASS INDEX: 32.82 KG/M2 | RESPIRATION RATE: 18 BRPM | SYSTOLIC BLOOD PRESSURE: 121 MMHG

## 2022-01-01 VITALS
HEART RATE: 116 BPM | WEIGHT: 178 LBS | BODY MASS INDEX: 32.82 KG/M2 | DIASTOLIC BLOOD PRESSURE: 60 MMHG | RESPIRATION RATE: 16 BRPM | SYSTOLIC BLOOD PRESSURE: 118 MMHG

## 2022-01-01 VITALS
TEMPERATURE: 98.9 F | HEART RATE: 112 BPM | SYSTOLIC BLOOD PRESSURE: 117 MMHG | DIASTOLIC BLOOD PRESSURE: 56 MMHG | OXYGEN SATURATION: 95 % | RESPIRATION RATE: 16 BRPM

## 2022-01-01 DIAGNOSIS — G30.9 ALZHEIMER'S DISEASE (H): ICD-10-CM

## 2022-01-01 DIAGNOSIS — I48.4 ATYPICAL ATRIAL FLUTTER (H): ICD-10-CM

## 2022-01-01 DIAGNOSIS — F03.90 SENILE DEMENTIA, UNCOMPLICATED (H): ICD-10-CM

## 2022-01-01 DIAGNOSIS — D50.9 IRON DEFICIENCY ANEMIA, UNSPECIFIED IRON DEFICIENCY ANEMIA TYPE: ICD-10-CM

## 2022-01-01 DIAGNOSIS — E78.5 HYPERLIPIDEMIA, UNSPECIFIED HYPERLIPIDEMIA TYPE: ICD-10-CM

## 2022-01-01 DIAGNOSIS — F41.1 GENERALIZED ANXIETY DISORDER: ICD-10-CM

## 2022-01-01 DIAGNOSIS — G30.9 ALZHEIMER'S DISEASE (H): Primary | ICD-10-CM

## 2022-01-01 DIAGNOSIS — F02.80 DEMENTIA ASSOCIATED WITH OTHER UNDERLYING DISEASE WITHOUT BEHAVIORAL DISTURBANCE (H): ICD-10-CM

## 2022-01-01 DIAGNOSIS — R25.1 TREMOR: ICD-10-CM

## 2022-01-01 DIAGNOSIS — F03.90 DEMENTIA WITHOUT BEHAVIORAL DISTURBANCE, PSYCHOTIC DISTURBANCE, MOOD DISTURBANCE, OR ANXIETY, UNSPECIFIED DEMENTIA SEVERITY, UNSPECIFIED DEMENTIA TYPE (H): ICD-10-CM

## 2022-01-01 DIAGNOSIS — E55.9 VITAMIN D DEFICIENCY: ICD-10-CM

## 2022-01-01 DIAGNOSIS — F02.80 ALZHEIMER'S DISEASE (H): ICD-10-CM

## 2022-01-01 DIAGNOSIS — R00.0 TACHYCARDIA: ICD-10-CM

## 2022-01-01 DIAGNOSIS — R26.9 ABNORMAL GAIT: ICD-10-CM

## 2022-01-01 DIAGNOSIS — E61.1 IRON DEFICIENCY: Primary | ICD-10-CM

## 2022-01-01 DIAGNOSIS — E61.1 IRON DEFICIENCY: ICD-10-CM

## 2022-01-01 DIAGNOSIS — Z51.5 HOSPICE CARE PATIENT: Primary | ICD-10-CM

## 2022-01-01 DIAGNOSIS — I48.4 ATYPICAL ATRIAL FLUTTER (H): Primary | ICD-10-CM

## 2022-01-01 DIAGNOSIS — R26.9 ABNORMAL GAIT: Primary | ICD-10-CM

## 2022-01-01 DIAGNOSIS — D64.9 ANEMIA, UNSPECIFIED TYPE: ICD-10-CM

## 2022-01-01 DIAGNOSIS — K52.9 COLITIS: ICD-10-CM

## 2022-01-01 DIAGNOSIS — Z78.0 ASYMPTOMATIC MENOPAUSAL STATE: Primary | ICD-10-CM

## 2022-01-01 DIAGNOSIS — F02.80 ALZHEIMER'S DISEASE (H): Primary | ICD-10-CM

## 2022-01-01 DIAGNOSIS — F41.1 GENERALIZED ANXIETY DISORDER: Primary | ICD-10-CM

## 2022-01-01 DIAGNOSIS — K92.2 LOWER GI BLEED: ICD-10-CM

## 2022-01-01 DIAGNOSIS — R26.89 BALANCE DISORDER: ICD-10-CM

## 2022-01-01 LAB
ABO/RH(D): NORMAL
ALBUMIN SERPL BCG-MCNC: 4.2 G/DL (ref 3.5–5.2)
ALBUMIN UR-MCNC: NEGATIVE MG/DL
ALP SERPL-CCNC: 58 U/L (ref 35–104)
ALT SERPL W P-5'-P-CCNC: 14 U/L (ref 10–35)
ANION GAP SERPL CALCULATED.3IONS-SCNC: 10 MMOL/L (ref 7–15)
ANION GAP SERPL CALCULATED.3IONS-SCNC: 12 MMOL/L (ref 7–15)
ANION GAP SERPL CALCULATED.3IONS-SCNC: 8 MMOL/L (ref 7–15)
ANTIBODY SCREEN: NEGATIVE
APPEARANCE UR: CLEAR
AST SERPL W P-5'-P-CCNC: 26 U/L (ref 10–35)
ATRIAL RATE - MUSE: 234 BPM
BACTERIA #/AREA URNS HPF: ABNORMAL /HPF
BACTERIA UR CULT: NORMAL
BASOPHILS # BLD AUTO: 0 10E3/UL (ref 0–0.2)
BASOPHILS NFR BLD AUTO: 1 %
BILIRUB SERPL-MCNC: <0.2 MG/DL
BILIRUB UR QL STRIP: NEGATIVE
BUN SERPL-MCNC: 17.5 MG/DL (ref 8–23)
BUN SERPL-MCNC: 28.3 MG/DL (ref 8–23)
BUN SERPL-MCNC: 29.7 MG/DL (ref 8–23)
CALCIUM SERPL-MCNC: 8.2 MG/DL (ref 8.8–10.2)
CALCIUM SERPL-MCNC: 8.8 MG/DL (ref 8.8–10.2)
CALCIUM SERPL-MCNC: 9.1 MG/DL (ref 8.8–10.2)
CHLORIDE SERPL-SCNC: 100 MMOL/L (ref 98–107)
CHLORIDE SERPL-SCNC: 105 MMOL/L (ref 98–107)
CHLORIDE SERPL-SCNC: 111 MMOL/L (ref 98–107)
COLOR UR AUTO: ABNORMAL
CREAT SERPL-MCNC: 0.76 MG/DL (ref 0.51–0.95)
CREAT SERPL-MCNC: 0.87 MG/DL (ref 0.51–0.95)
CREAT SERPL-MCNC: 0.92 MG/DL (ref 0.51–0.95)
DEPRECATED HCO3 PLAS-SCNC: 24 MMOL/L (ref 22–29)
DEPRECATED HCO3 PLAS-SCNC: 24 MMOL/L (ref 22–29)
DEPRECATED HCO3 PLAS-SCNC: 25 MMOL/L (ref 22–29)
DIASTOLIC BLOOD PRESSURE - MUSE: NORMAL MMHG
EOSINOPHIL # BLD AUTO: 0.2 10E3/UL (ref 0–0.7)
EOSINOPHIL NFR BLD AUTO: 2 %
ERYTHROCYTE [DISTWIDTH] IN BLOOD BY AUTOMATED COUNT: 15 % (ref 10–15)
ERYTHROCYTE [DISTWIDTH] IN BLOOD BY AUTOMATED COUNT: 15.1 % (ref 10–15)
ERYTHROCYTE [DISTWIDTH] IN BLOOD BY AUTOMATED COUNT: 16.8 % (ref 10–15)
FERRITIN SERPL-MCNC: 15 NG/ML (ref 11–328)
GFR SERPL CREATININE-BSD FRML MDRD: 60 ML/MIN/1.73M2
GFR SERPL CREATININE-BSD FRML MDRD: 64 ML/MIN/1.73M2
GFR SERPL CREATININE-BSD FRML MDRD: 75 ML/MIN/1.73M2
GLUCOSE SERPL-MCNC: 100 MG/DL (ref 70–99)
GLUCOSE SERPL-MCNC: 118 MG/DL (ref 70–99)
GLUCOSE SERPL-MCNC: 95 MG/DL (ref 70–99)
GLUCOSE UR STRIP-MCNC: NEGATIVE MG/DL
HCT VFR BLD AUTO: 23.9 % (ref 35–47)
HCT VFR BLD AUTO: 26.7 % (ref 35–47)
HCT VFR BLD AUTO: 28.8 % (ref 35–47)
HEMOCCULT STL QL: POSITIVE
HGB BLD-MCNC: 7.5 G/DL (ref 11.7–15.7)
HGB BLD-MCNC: 8.1 G/DL (ref 11.7–15.7)
HGB BLD-MCNC: 8.2 G/DL (ref 11.7–15.7)
HGB BLD-MCNC: 8.5 G/DL (ref 11.7–15.7)
HGB UR QL STRIP: ABNORMAL
IMM GRANULOCYTES # BLD: 0 10E3/UL
IMM GRANULOCYTES NFR BLD: 0 %
INR PPP: 1.09 (ref 0.85–1.15)
INTERPRETATION ECG - MUSE: NORMAL
IRON BINDING CAPACITY (ROCHE): 416 UG/DL (ref 240–430)
IRON SATN MFR SERPL: 4 % (ref 15–46)
IRON SERPL-MCNC: 16 UG/DL (ref 37–145)
KETONES UR STRIP-MCNC: NEGATIVE MG/DL
LACTATE SERPL-SCNC: 0.9 MMOL/L (ref 0.7–2)
LEUKOCYTE ESTERASE UR QL STRIP: ABNORMAL
LYMPHOCYTES # BLD AUTO: 1.7 10E3/UL (ref 0.8–5.3)
LYMPHOCYTES NFR BLD AUTO: 21 %
MAGNESIUM SERPL-MCNC: 2.1 MG/DL (ref 1.7–2.3)
MCH RBC QN AUTO: 22.6 PG (ref 26.5–33)
MCH RBC QN AUTO: 28.1 PG (ref 26.5–33)
MCH RBC QN AUTO: 28.8 PG (ref 26.5–33)
MCHC RBC AUTO-ENTMCNC: 29.5 G/DL (ref 31.5–36.5)
MCHC RBC AUTO-ENTMCNC: 30.7 G/DL (ref 31.5–36.5)
MCHC RBC AUTO-ENTMCNC: 31.4 G/DL (ref 31.5–36.5)
MCV RBC AUTO: 77 FL (ref 78–100)
MCV RBC AUTO: 91 FL (ref 78–100)
MCV RBC AUTO: 92 FL (ref 78–100)
MONOCYTES # BLD AUTO: 0.6 10E3/UL (ref 0–1.3)
MONOCYTES NFR BLD AUTO: 7 %
NEUTROPHILS # BLD AUTO: 5.4 10E3/UL (ref 1.6–8.3)
NEUTROPHILS NFR BLD AUTO: 69 %
NITRATE UR QL: POSITIVE
NRBC # BLD AUTO: 0 10E3/UL
NRBC BLD AUTO-RTO: 0 /100
P AXIS - MUSE: NORMAL DEGREES
PH UR STRIP: 6 [PH] (ref 5–7)
PLATELET # BLD AUTO: 220 10E3/UL (ref 150–450)
PLATELET # BLD AUTO: 252 10E3/UL (ref 150–450)
PLATELET # BLD AUTO: 316 10E3/UL (ref 150–450)
POTASSIUM SERPL-SCNC: 4 MMOL/L (ref 3.4–5.3)
POTASSIUM SERPL-SCNC: 4.3 MMOL/L (ref 3.4–5.3)
POTASSIUM SERPL-SCNC: 4.7 MMOL/L (ref 3.4–5.3)
PR INTERVAL - MUSE: NORMAL MS
PROT SERPL-MCNC: 6.9 G/DL (ref 6.4–8.3)
QRS DURATION - MUSE: 62 MS
QT - MUSE: 342 MS
QTC - MUSE: 477 MS
R AXIS - MUSE: 24 DEGREES
RBC # BLD AUTO: 2.6 10E6/UL (ref 3.8–5.2)
RBC # BLD AUTO: 2.92 10E6/UL (ref 3.8–5.2)
RBC # BLD AUTO: 3.76 10E6/UL (ref 3.8–5.2)
RBC URINE: 2 /HPF
SODIUM SERPL-SCNC: 136 MMOL/L (ref 136–145)
SODIUM SERPL-SCNC: 140 MMOL/L (ref 136–145)
SODIUM SERPL-SCNC: 143 MMOL/L (ref 136–145)
SP GR UR STRIP: 1.03 (ref 1–1.03)
SPECIMEN EXPIRATION DATE: NORMAL
SQUAMOUS EPITHELIAL: <1 /HPF
SYSTOLIC BLOOD PRESSURE - MUSE: NORMAL MMHG
T AXIS - MUSE: 45 DEGREES
TSH SERPL DL<=0.005 MIU/L-ACNC: 1.76 UIU/ML (ref 0.3–4.2)
UROBILINOGEN UR STRIP-MCNC: <2 MG/DL
VENTRICULAR RATE- MUSE: 117 BPM
VIT B12 SERPL-MCNC: 418 PG/ML (ref 232–1245)
WBC # BLD AUTO: 4.7 10E3/UL (ref 4–11)
WBC # BLD AUTO: 7.5 10E3/UL (ref 4–11)
WBC # BLD AUTO: 7.8 10E3/UL (ref 4–11)
WBC URINE: 89 /HPF

## 2022-01-01 PROCEDURE — 250N000011 HC RX IP 250 OP 636: Performed by: INTERNAL MEDICINE

## 2022-01-01 PROCEDURE — 82272 OCCULT BLD FECES 1-3 TESTS: CPT | Performed by: FAMILY MEDICINE

## 2022-01-01 PROCEDURE — 250N000013 HC RX MED GY IP 250 OP 250 PS 637: Performed by: INTERNAL MEDICINE

## 2022-01-01 PROCEDURE — G0439 PPPS, SUBSEQ VISIT: HCPCS | Performed by: INTERNAL MEDICINE

## 2022-01-01 PROCEDURE — 110N000005 HC R&B HOSPICE, ACCENT

## 2022-01-01 PROCEDURE — 36415 COLL VENOUS BLD VENIPUNCTURE: CPT | Performed by: INTERNAL MEDICINE

## 2022-01-01 PROCEDURE — 99225 PR SUBSEQUENT OBSERVATION CARE,LEVEL II: CPT | Performed by: INTERNAL MEDICINE

## 2022-01-01 PROCEDURE — 90677 PCV20 VACCINE IM: CPT | Performed by: INTERNAL MEDICINE

## 2022-01-01 PROCEDURE — 99232 SBSQ HOSP IP/OBS MODERATE 35: CPT | Mod: GV | Performed by: INTERNAL MEDICINE

## 2022-01-01 PROCEDURE — 99233 SBSQ HOSP IP/OBS HIGH 50: CPT | Mod: GV | Performed by: INTERNAL MEDICINE

## 2022-01-01 PROCEDURE — 93010 ELECTROCARDIOGRAM REPORT: CPT | Performed by: INTERNAL MEDICINE

## 2022-01-01 PROCEDURE — 99285 EMERGENCY DEPT VISIT HI MDM: CPT | Mod: 25

## 2022-01-01 PROCEDURE — 96376 TX/PRO/DX INJ SAME DRUG ADON: CPT

## 2022-01-01 PROCEDURE — 80048 BASIC METABOLIC PNL TOTAL CA: CPT | Performed by: INTERNAL MEDICINE

## 2022-01-01 PROCEDURE — G0008 ADMIN INFLUENZA VIRUS VAC: HCPCS | Performed by: INTERNAL MEDICINE

## 2022-01-01 PROCEDURE — 999N000248 HC STATISTIC IV INSERT WITH US BY RN

## 2022-01-01 PROCEDURE — 96361 HYDRATE IV INFUSION ADD-ON: CPT

## 2022-01-01 PROCEDURE — 99204 OFFICE O/P NEW MOD 45 MIN: CPT | Performed by: INTERNAL MEDICINE

## 2022-01-01 PROCEDURE — 86850 RBC ANTIBODY SCREEN: CPT | Performed by: FAMILY MEDICINE

## 2022-01-01 PROCEDURE — G0378 HOSPITAL OBSERVATION PER HR: HCPCS

## 2022-01-01 PROCEDURE — 99213 OFFICE O/P EST LOW 20 MIN: CPT | Mod: 25 | Performed by: INTERNAL MEDICINE

## 2022-01-01 PROCEDURE — 81001 URINALYSIS AUTO W/SCOPE: CPT | Performed by: EMERGENCY MEDICINE

## 2022-01-01 PROCEDURE — 82728 ASSAY OF FERRITIN: CPT | Performed by: INTERNAL MEDICINE

## 2022-01-01 PROCEDURE — 83735 ASSAY OF MAGNESIUM: CPT | Performed by: FAMILY MEDICINE

## 2022-01-01 PROCEDURE — 85018 HEMOGLOBIN: CPT | Mod: 91 | Performed by: INTERNAL MEDICINE

## 2022-01-01 PROCEDURE — 83605 ASSAY OF LACTIC ACID: CPT | Performed by: FAMILY MEDICINE

## 2022-01-01 PROCEDURE — 93005 ELECTROCARDIOGRAM TRACING: CPT | Performed by: FAMILY MEDICINE

## 2022-01-01 PROCEDURE — 83540 ASSAY OF IRON: CPT | Performed by: INTERNAL MEDICINE

## 2022-01-01 PROCEDURE — G0009 ADMIN PNEUMOCOCCAL VACCINE: HCPCS | Performed by: INTERNAL MEDICINE

## 2022-01-01 PROCEDURE — 87086 URINE CULTURE/COLONY COUNT: CPT | Performed by: EMERGENCY MEDICINE

## 2022-01-01 PROCEDURE — 258N000003 HC RX IP 258 OP 636: Performed by: INTERNAL MEDICINE

## 2022-01-01 PROCEDURE — 99239 HOSP IP/OBS DSCHRG MGMT >30: CPT | Performed by: INTERNAL MEDICINE

## 2022-01-01 PROCEDURE — 80053 COMPREHEN METABOLIC PANEL: CPT | Performed by: INTERNAL MEDICINE

## 2022-01-01 PROCEDURE — 85027 COMPLETE CBC AUTOMATED: CPT | Performed by: INTERNAL MEDICINE

## 2022-01-01 PROCEDURE — 93005 ELECTROCARDIOGRAM TRACING: CPT | Performed by: INTERNAL MEDICINE

## 2022-01-01 PROCEDURE — 36415 COLL VENOUS BLD VENIPUNCTURE: CPT | Performed by: FAMILY MEDICINE

## 2022-01-01 PROCEDURE — 83550 IRON BINDING TEST: CPT | Performed by: INTERNAL MEDICINE

## 2022-01-01 PROCEDURE — 85610 PROTHROMBIN TIME: CPT | Performed by: INTERNAL MEDICINE

## 2022-01-01 PROCEDURE — 99217 PR OBSERVATION CARE DISCHARGE: CPT | Performed by: INTERNAL MEDICINE

## 2022-01-01 PROCEDURE — 90662 IIV NO PRSV INCREASED AG IM: CPT | Performed by: INTERNAL MEDICINE

## 2022-01-01 PROCEDURE — 82607 VITAMIN B-12: CPT | Performed by: INTERNAL MEDICINE

## 2022-01-01 PROCEDURE — 96375 TX/PRO/DX INJ NEW DRUG ADDON: CPT | Mod: 59

## 2022-01-01 PROCEDURE — 84443 ASSAY THYROID STIM HORMONE: CPT | Performed by: INTERNAL MEDICINE

## 2022-01-01 PROCEDURE — 99220 PR INITIAL OBSERVATION CARE,LEVEL III: CPT | Performed by: INTERNAL MEDICINE

## 2022-01-01 PROCEDURE — 250N000011 HC RX IP 250 OP 636: Performed by: FAMILY MEDICINE

## 2022-01-01 PROCEDURE — 85025 COMPLETE CBC W/AUTO DIFF WBC: CPT | Performed by: FAMILY MEDICINE

## 2022-01-01 PROCEDURE — 96360 HYDRATION IV INFUSION INIT: CPT | Mod: 26

## 2022-01-01 PROCEDURE — 74174 CTA ABD&PLVS W/CONTRAST: CPT

## 2022-01-01 PROCEDURE — 96374 THER/PROPH/DIAG INJ IV PUSH: CPT | Mod: 59

## 2022-01-01 PROCEDURE — 258N000003 HC RX IP 258 OP 636: Performed by: FAMILY MEDICINE

## 2022-01-01 PROCEDURE — 80048 BASIC METABOLIC PNL TOTAL CA: CPT | Performed by: FAMILY MEDICINE

## 2022-01-01 RX ORDER — SODIUM CHLORIDE 9 MG/ML
INJECTION, SOLUTION INTRAVENOUS CONTINUOUS
Status: DISCONTINUED | OUTPATIENT
Start: 2022-01-01 | End: 2022-01-01

## 2022-01-01 RX ORDER — CARBOXYMETHYLCELLULOSE SODIUM 5 MG/ML
1-2 SOLUTION/ DROPS OPHTHALMIC
Status: DISCONTINUED | OUTPATIENT
Start: 2022-01-01 | End: 2022-01-01 | Stop reason: HOSPADM

## 2022-01-01 RX ORDER — PROCHLORPERAZINE 25 MG
12.5 SUPPOSITORY, RECTAL RECTAL EVERY 12 HOURS PRN
Status: DISCONTINUED | OUTPATIENT
Start: 2022-01-01 | End: 2022-01-01 | Stop reason: HOSPADM

## 2022-01-01 RX ORDER — SALIVA STIMULANT COMB. NO.3
1 SPRAY, NON-AEROSOL (ML) MUCOUS MEMBRANE
Status: DISCONTINUED | OUTPATIENT
Start: 2022-01-01 | End: 2022-01-01 | Stop reason: HOSPADM

## 2022-01-01 RX ORDER — LORAZEPAM 2 MG/ML
0.5 CONCENTRATE ORAL EVERY 4 HOURS
Qty: 5 ML | Refills: 0 | Status: SHIPPED | OUTPATIENT
Start: 2022-01-01

## 2022-01-01 RX ORDER — MORPHINE SULFATE 20 MG/ML
5 SOLUTION ORAL
Status: DISCONTINUED | OUTPATIENT
Start: 2022-01-01 | End: 2022-01-01

## 2022-01-01 RX ORDER — PROCHLORPERAZINE 25 MG
12.5 SUPPOSITORY, RECTAL RECTAL EVERY 12 HOURS PRN
Status: CANCELLED | OUTPATIENT
Start: 2022-01-01

## 2022-01-01 RX ORDER — MORPHINE SULFATE 20 MG/ML
10 SOLUTION ORAL
Status: DISCONTINUED | OUTPATIENT
Start: 2022-01-01 | End: 2022-01-01 | Stop reason: HOSPADM

## 2022-01-01 RX ORDER — LIDOCAINE 40 MG/G
CREAM TOPICAL
Status: DISCONTINUED | OUTPATIENT
Start: 2022-01-01 | End: 2022-01-01

## 2022-01-01 RX ORDER — ATROPINE SULFATE 10 MG/ML
2 SOLUTION/ DROPS OPHTHALMIC EVERY 4 HOURS PRN
Status: DISCONTINUED | OUTPATIENT
Start: 2022-01-01 | End: 2022-01-01 | Stop reason: HOSPADM

## 2022-01-01 RX ORDER — NALOXONE HYDROCHLORIDE 0.4 MG/ML
0.2 INJECTION, SOLUTION INTRAMUSCULAR; INTRAVENOUS; SUBCUTANEOUS
Status: CANCELLED | OUTPATIENT
Start: 2022-01-01

## 2022-01-01 RX ORDER — PIPERACILLIN SODIUM, TAZOBACTAM SODIUM 3; .375 G/15ML; G/15ML
3.38 INJECTION, POWDER, LYOPHILIZED, FOR SOLUTION INTRAVENOUS EVERY 8 HOURS
Status: DISCONTINUED | OUTPATIENT
Start: 2022-01-01 | End: 2022-01-01 | Stop reason: DRUGHIGH

## 2022-01-01 RX ORDER — DONEPEZIL HYDROCHLORIDE 10 MG/1
10 TABLET, FILM COATED ORAL AT BEDTIME
Qty: 90 TABLET | Refills: 3 | Status: ON HOLD | OUTPATIENT
Start: 2022-01-01 | End: 2022-01-01

## 2022-01-01 RX ORDER — NALOXONE HYDROCHLORIDE 0.4 MG/ML
0.2 INJECTION, SOLUTION INTRAMUSCULAR; INTRAVENOUS; SUBCUTANEOUS
Status: DISCONTINUED | OUTPATIENT
Start: 2022-01-01 | End: 2022-01-01 | Stop reason: HOSPADM

## 2022-01-01 RX ORDER — ESCITALOPRAM OXALATE 5 MG/1
10 TABLET ORAL EVERY MORNING
Status: CANCELLED | OUTPATIENT
Start: 2022-01-01

## 2022-01-01 RX ORDER — DONEPEZIL HYDROCHLORIDE 5 MG/1
10 TABLET, FILM COATED ORAL AT BEDTIME
Status: CANCELLED | OUTPATIENT
Start: 2022-01-01

## 2022-01-01 RX ORDER — MINERAL OIL/HYDROPHIL PETROLAT
OINTMENT (GRAM) TOPICAL
Status: DISCONTINUED | OUTPATIENT
Start: 2022-01-01 | End: 2022-01-01 | Stop reason: HOSPADM

## 2022-01-01 RX ORDER — ATROPINE SULFATE 10 MG/ML
2 SOLUTION/ DROPS OPHTHALMIC EVERY 4 HOURS PRN
Status: CANCELLED | OUTPATIENT
Start: 2022-01-01

## 2022-01-01 RX ORDER — ASPIRIN 81 MG/1
81 TABLET, CHEWABLE ORAL EVERY MORNING
Qty: 90 TABLET | Refills: 3 | Status: ON HOLD | OUTPATIENT
Start: 2022-01-01 | End: 2022-01-01

## 2022-01-01 RX ORDER — MORPHINE SULFATE 10 MG/5ML
5 SOLUTION ORAL
Status: CANCELLED | OUTPATIENT
Start: 2022-01-01

## 2022-01-01 RX ORDER — DONEPEZIL HYDROCHLORIDE 10 MG/1
10 TABLET, FILM COATED ORAL AT BEDTIME
Qty: 90 TABLET | Refills: 3 | Status: SHIPPED | OUTPATIENT
Start: 2022-01-01 | End: 2022-01-01

## 2022-01-01 RX ORDER — LORAZEPAM 2 MG/ML
0.5 CONCENTRATE ORAL EVERY 4 HOURS
Status: DISCONTINUED | OUTPATIENT
Start: 2022-01-01 | End: 2022-01-01 | Stop reason: HOSPADM

## 2022-01-01 RX ORDER — GABAPENTIN 600 MG/1
600 TABLET ORAL DAILY
Qty: 90 TABLET | Refills: 3 | Status: SHIPPED | OUTPATIENT
Start: 2022-01-01

## 2022-01-01 RX ORDER — NALOXONE HYDROCHLORIDE 0.4 MG/ML
0.2 INJECTION, SOLUTION INTRAMUSCULAR; INTRAVENOUS; SUBCUTANEOUS
Status: DISCONTINUED | OUTPATIENT
Start: 2022-01-01 | End: 2022-01-01

## 2022-01-01 RX ORDER — GABAPENTIN 300 MG/1
600 CAPSULE ORAL EVERY EVENING
Status: DISCONTINUED | OUTPATIENT
Start: 2022-01-01 | End: 2022-01-01 | Stop reason: HOSPADM

## 2022-01-01 RX ORDER — LIDOCAINE 40 MG/G
CREAM TOPICAL
Status: CANCELLED | OUTPATIENT
Start: 2022-01-01

## 2022-01-01 RX ORDER — DIAZEPAM 10 MG/2ML
2.5 INJECTION, SOLUTION INTRAMUSCULAR; INTRAVENOUS EVERY 6 HOURS PRN
Status: DISCONTINUED | OUTPATIENT
Start: 2022-01-01 | End: 2022-01-01

## 2022-01-01 RX ORDER — MORPHINE SULFATE 10 MG/5ML
5 SOLUTION ORAL
Status: DISCONTINUED | OUTPATIENT
Start: 2022-01-01 | End: 2022-01-01 | Stop reason: HOSPADM

## 2022-01-01 RX ORDER — LORAZEPAM 2 MG/ML
1 CONCENTRATE ORAL
Status: DISCONTINUED | OUTPATIENT
Start: 2022-01-01 | End: 2022-01-01 | Stop reason: HOSPADM

## 2022-01-01 RX ORDER — MORPHINE SULFATE 10 MG/5ML
5 SOLUTION ORAL
Status: DISCONTINUED | OUTPATIENT
Start: 2022-01-01 | End: 2022-01-01

## 2022-01-01 RX ORDER — LORAZEPAM 1 MG/1
1 TABLET ORAL
Status: CANCELLED | OUTPATIENT
Start: 2022-01-01

## 2022-01-01 RX ORDER — TRAZODONE HYDROCHLORIDE 50 MG/1
50 TABLET, FILM COATED ORAL AT BEDTIME
Status: DISCONTINUED | OUTPATIENT
Start: 2022-01-01 | End: 2022-01-01 | Stop reason: HOSPADM

## 2022-01-01 RX ORDER — PIPERACILLIN SODIUM, TAZOBACTAM SODIUM 3; .375 G/15ML; G/15ML
3.38 INJECTION, POWDER, LYOPHILIZED, FOR SOLUTION INTRAVENOUS EVERY 8 HOURS
Status: DISCONTINUED | OUTPATIENT
Start: 2022-01-01 | End: 2022-01-01

## 2022-01-01 RX ORDER — DIAZEPAM 10 MG/2ML
2.5 INJECTION, SOLUTION INTRAMUSCULAR; INTRAVENOUS EVERY 6 HOURS PRN
Status: CANCELLED | OUTPATIENT
Start: 2022-01-01

## 2022-01-01 RX ORDER — METOPROLOL TARTRATE 25 MG/1
12.5 TABLET, FILM COATED ORAL 2 TIMES DAILY
Qty: 45 TABLET | Refills: 3 | Status: SHIPPED | OUTPATIENT
Start: 2022-01-01 | End: 2022-01-01

## 2022-01-01 RX ORDER — IOPAMIDOL 755 MG/ML
75 INJECTION, SOLUTION INTRAVASCULAR ONCE
Status: COMPLETED | OUTPATIENT
Start: 2022-01-01 | End: 2022-01-01

## 2022-01-01 RX ORDER — PROCHLORPERAZINE MALEATE 5 MG
5 TABLET ORAL EVERY 6 HOURS PRN
Status: DISCONTINUED | OUTPATIENT
Start: 2022-01-01 | End: 2022-01-01 | Stop reason: HOSPADM

## 2022-01-01 RX ORDER — GABAPENTIN 600 MG/1
600 TABLET ORAL DAILY
Qty: 90 TABLET | Refills: 3 | Status: SHIPPED | OUTPATIENT
Start: 2022-01-01 | End: 2022-01-01

## 2022-01-01 RX ORDER — LORAZEPAM 2 MG/ML
1 CONCENTRATE ORAL
Status: DISCONTINUED | OUTPATIENT
Start: 2022-01-01 | End: 2022-01-01

## 2022-01-01 RX ORDER — LORAZEPAM 2 MG/ML
1 CONCENTRATE ORAL
Status: CANCELLED | OUTPATIENT
Start: 2022-01-01

## 2022-01-01 RX ORDER — GLYCOPYRROLATE 0.2 MG/ML
0.2 INJECTION, SOLUTION INTRAMUSCULAR; INTRAVENOUS EVERY 4 HOURS PRN
Status: DISCONTINUED | OUTPATIENT
Start: 2022-01-01 | End: 2022-01-01

## 2022-01-01 RX ORDER — ESCITALOPRAM OXALATE 5 MG/1
10 TABLET ORAL EVERY MORNING
Status: DISCONTINUED | OUTPATIENT
Start: 2022-01-01 | End: 2022-01-01 | Stop reason: HOSPADM

## 2022-01-01 RX ORDER — MORPHINE SULFATE 20 MG/ML
5 SOLUTION ORAL
Status: CANCELLED | OUTPATIENT
Start: 2022-01-01

## 2022-01-01 RX ORDER — ESCITALOPRAM OXALATE 5 MG/1
10 TABLET ORAL EVERY MORNING
Status: DISCONTINUED | OUTPATIENT
Start: 2022-01-01 | End: 2022-01-01

## 2022-01-01 RX ORDER — ACETAMINOPHEN 500 MG
500 TABLET ORAL EVERY 6 HOURS PRN
Status: CANCELLED | OUTPATIENT
Start: 2022-01-01

## 2022-01-01 RX ORDER — NALOXONE HYDROCHLORIDE 0.4 MG/ML
0.1 INJECTION, SOLUTION INTRAMUSCULAR; INTRAVENOUS; SUBCUTANEOUS
Status: DISCONTINUED | OUTPATIENT
Start: 2022-01-01 | End: 2022-01-01 | Stop reason: HOSPADM

## 2022-01-01 RX ORDER — GLYCOPYRROLATE 0.2 MG/ML
0.2 INJECTION, SOLUTION INTRAMUSCULAR; INTRAVENOUS EVERY 4 HOURS PRN
Status: CANCELLED | OUTPATIENT
Start: 2022-01-01

## 2022-01-01 RX ORDER — CEFTRIAXONE 1 G/1
1 INJECTION, POWDER, FOR SOLUTION INTRAMUSCULAR; INTRAVENOUS EVERY 24 HOURS
Status: DISCONTINUED | OUTPATIENT
Start: 2022-01-01 | End: 2022-01-01

## 2022-01-01 RX ORDER — ROSUVASTATIN CALCIUM 10 MG/1
10 TABLET, COATED ORAL EVERY EVENING
Qty: 90 TABLET | Refills: 3 | Status: ON HOLD | OUTPATIENT
Start: 2022-01-01 | End: 2022-01-01

## 2022-01-01 RX ORDER — MORPHINE SULFATE 10 MG/5ML
10 SOLUTION ORAL
Status: DISCONTINUED | OUTPATIENT
Start: 2022-01-01 | End: 2022-01-01 | Stop reason: HOSPADM

## 2022-01-01 RX ORDER — MINERAL OIL/HYDROPHIL PETROLAT
OINTMENT (GRAM) TOPICAL
Status: CANCELLED | OUTPATIENT
Start: 2022-01-01

## 2022-01-01 RX ORDER — GABAPENTIN 300 MG/1
600 CAPSULE ORAL AT BEDTIME
Status: DISCONTINUED | OUTPATIENT
Start: 2022-01-01 | End: 2022-01-01

## 2022-01-01 RX ORDER — MORPHINE SULFATE 10 MG/5ML
10 SOLUTION ORAL
Status: CANCELLED | OUTPATIENT
Start: 2022-01-01

## 2022-01-01 RX ORDER — GLYCOPYRROLATE 0.2 MG/ML
0.2 INJECTION, SOLUTION INTRAMUSCULAR; INTRAVENOUS EVERY 4 HOURS PRN
Status: DISCONTINUED | OUTPATIENT
Start: 2022-01-01 | End: 2022-01-01 | Stop reason: HOSPADM

## 2022-01-01 RX ORDER — MORPHINE SULFATE 20 MG/ML
5 SOLUTION ORAL
Status: DISCONTINUED | OUTPATIENT
Start: 2022-01-01 | End: 2022-01-01 | Stop reason: HOSPADM

## 2022-01-01 RX ORDER — ROSUVASTATIN CALCIUM 10 MG/1
10 TABLET, COATED ORAL EVERY EVENING
Status: DISCONTINUED | OUTPATIENT
Start: 2022-01-01 | End: 2022-01-01 | Stop reason: ALTCHOICE

## 2022-01-01 RX ORDER — LIDOCAINE HYDROCHLORIDE 20 MG/ML
5 JELLY TOPICAL
Status: DISCONTINUED | OUTPATIENT
Start: 2022-01-01 | End: 2022-01-01 | Stop reason: HOSPADM

## 2022-01-01 RX ORDER — METOPROLOL TARTRATE 25 MG/1
12.5 TABLET, FILM COATED ORAL 2 TIMES DAILY
Qty: 90 TABLET | Refills: 3 | Status: SHIPPED | OUTPATIENT
Start: 2022-01-01 | End: 2022-01-01

## 2022-01-01 RX ORDER — METOPROLOL TARTRATE 25 MG/1
12.5 TABLET, FILM COATED ORAL 2 TIMES DAILY
Qty: 90 TABLET | Refills: 3 | Status: ON HOLD | OUTPATIENT
Start: 2022-01-01 | End: 2022-01-01

## 2022-01-01 RX ORDER — NALOXONE HYDROCHLORIDE 0.4 MG/ML
0.1 INJECTION, SOLUTION INTRAMUSCULAR; INTRAVENOUS; SUBCUTANEOUS
Status: DISCONTINUED | OUTPATIENT
Start: 2022-01-01 | End: 2022-01-01

## 2022-01-01 RX ORDER — CARBOXYMETHYLCELLULOSE SODIUM 5 MG/ML
1-2 SOLUTION/ DROPS OPHTHALMIC
Status: CANCELLED | OUTPATIENT
Start: 2022-01-01

## 2022-01-01 RX ORDER — NALOXONE HYDROCHLORIDE 0.4 MG/ML
0.1 INJECTION, SOLUTION INTRAMUSCULAR; INTRAVENOUS; SUBCUTANEOUS
Status: CANCELLED | OUTPATIENT
Start: 2022-01-01

## 2022-01-01 RX ORDER — ROSUVASTATIN CALCIUM 10 MG/1
10 TABLET, COATED ORAL DAILY
Qty: 90 TABLET | Refills: 3 | Status: SHIPPED | OUTPATIENT
Start: 2022-01-01 | End: 2022-01-01

## 2022-01-01 RX ORDER — ESCITALOPRAM OXALATE 5 MG/1
10 TABLET ORAL DAILY
Status: DISCONTINUED | OUTPATIENT
Start: 2022-01-01 | End: 2022-01-01 | Stop reason: HOSPADM

## 2022-01-01 RX ORDER — LIDOCAINE HYDROCHLORIDE 20 MG/ML
5 JELLY TOPICAL
Status: DISCONTINUED | OUTPATIENT
Start: 2022-01-01 | End: 2022-01-01

## 2022-01-01 RX ORDER — FERROUS SULFATE 325(65) MG
325 TABLET ORAL
Qty: 90 TABLET | Refills: 3 | Status: SHIPPED | OUTPATIENT
Start: 2022-01-01 | End: 2022-01-01

## 2022-01-01 RX ORDER — LIDOCAINE HYDROCHLORIDE 20 MG/ML
5 JELLY TOPICAL
Status: CANCELLED | OUTPATIENT
Start: 2022-01-01

## 2022-01-01 RX ORDER — MORPHINE SULFATE 20 MG/ML
10 SOLUTION ORAL
Status: CANCELLED | OUTPATIENT
Start: 2022-01-01

## 2022-01-01 RX ORDER — ESCITALOPRAM OXALATE 10 MG/1
10 TABLET ORAL EVERY MORNING
Qty: 90 TABLET | Refills: 3 | Status: SHIPPED | OUTPATIENT
Start: 2022-01-01

## 2022-01-01 RX ORDER — LIDOCAINE 40 MG/G
CREAM TOPICAL
Status: DISCONTINUED | OUTPATIENT
Start: 2022-01-01 | End: 2022-01-01 | Stop reason: HOSPADM

## 2022-01-01 RX ORDER — MORPHINE SULFATE 10 MG/5ML
10 SOLUTION ORAL
Status: DISCONTINUED | OUTPATIENT
Start: 2022-01-01 | End: 2022-01-01

## 2022-01-01 RX ORDER — DIAZEPAM 10 MG/2ML
2.5 INJECTION, SOLUTION INTRAMUSCULAR; INTRAVENOUS EVERY 6 HOURS PRN
Status: DISCONTINUED | OUTPATIENT
Start: 2022-01-01 | End: 2022-01-01 | Stop reason: HOSPADM

## 2022-01-01 RX ORDER — TRAZODONE HYDROCHLORIDE 50 MG/1
50 TABLET, FILM COATED ORAL AT BEDTIME
Qty: 30 TABLET | Refills: 0 | Status: SHIPPED | OUTPATIENT
Start: 2022-01-01

## 2022-01-01 RX ORDER — LORAZEPAM 1 MG/1
1 TABLET ORAL
Status: DISCONTINUED | OUTPATIENT
Start: 2022-01-01 | End: 2022-01-01

## 2022-01-01 RX ORDER — PIPERACILLIN SODIUM, TAZOBACTAM SODIUM 3; .375 G/15ML; G/15ML
3.38 INJECTION, POWDER, LYOPHILIZED, FOR SOLUTION INTRAVENOUS ONCE
Status: COMPLETED | OUTPATIENT
Start: 2022-01-01 | End: 2022-01-01

## 2022-01-01 RX ORDER — PROCHLORPERAZINE MALEATE 5 MG
5 TABLET ORAL EVERY 6 HOURS PRN
Status: CANCELLED | OUTPATIENT
Start: 2022-01-01

## 2022-01-01 RX ORDER — LORAZEPAM 1 MG/1
1 TABLET ORAL
Status: DISCONTINUED | OUTPATIENT
Start: 2022-01-01 | End: 2022-01-01 | Stop reason: HOSPADM

## 2022-01-01 RX ORDER — FERROUS SULFATE 325(65) MG
325 TABLET ORAL
Qty: 90 TABLET | Refills: 3 | Status: ON HOLD | OUTPATIENT
Start: 2022-01-01 | End: 2022-01-01

## 2022-01-01 RX ORDER — DONEPEZIL HYDROCHLORIDE 5 MG/1
10 TABLET, FILM COATED ORAL AT BEDTIME
Status: DISCONTINUED | OUTPATIENT
Start: 2022-01-01 | End: 2022-01-01 | Stop reason: HOSPADM

## 2022-01-01 RX ORDER — GABAPENTIN 300 MG/1
600 CAPSULE ORAL EVERY EVENING
Status: CANCELLED | OUTPATIENT
Start: 2022-01-01

## 2022-01-01 RX ORDER — DONEPEZIL HYDROCHLORIDE 5 MG/1
10 TABLET, FILM COATED ORAL AT BEDTIME
Status: DISCONTINUED | OUTPATIENT
Start: 2022-01-01 | End: 2022-01-01

## 2022-01-01 RX ORDER — FERROUS SULFATE 325(65) MG
325 TABLET ORAL
Status: DISCONTINUED | OUTPATIENT
Start: 2022-01-01 | End: 2022-01-01 | Stop reason: ALTCHOICE

## 2022-01-01 RX ORDER — SALIVA STIMULANT COMB. NO.3
1 SPRAY, NON-AEROSOL (ML) MUCOUS MEMBRANE
Status: CANCELLED | OUTPATIENT
Start: 2022-01-01

## 2022-01-01 RX ORDER — ACETAMINOPHEN 500 MG
500 TABLET ORAL EVERY 6 HOURS PRN
Status: DISCONTINUED | OUTPATIENT
Start: 2022-01-01 | End: 2022-01-01 | Stop reason: HOSPADM

## 2022-01-01 RX ORDER — ESCITALOPRAM OXALATE 10 MG/1
10 TABLET ORAL DAILY
Qty: 90 TABLET | Refills: 3 | Status: SHIPPED | OUTPATIENT
Start: 2022-01-01 | End: 2022-01-01

## 2022-01-01 RX ADMIN — LORAZEPAM 0.5 MG: 2 LIQUID ORAL at 19:53

## 2022-01-01 RX ADMIN — LORAZEPAM 0.5 MG: 2 LIQUID ORAL at 09:43

## 2022-01-01 RX ADMIN — METOPROLOL TARTRATE 12.5 MG: 25 TABLET, FILM COATED ORAL at 20:09

## 2022-01-01 RX ADMIN — GABAPENTIN 600 MG: 300 CAPSULE ORAL at 20:12

## 2022-01-01 RX ADMIN — LORAZEPAM 0.5 MG: 2 LIQUID ORAL at 17:21

## 2022-01-01 RX ADMIN — GABAPENTIN 600 MG: 300 CAPSULE ORAL at 21:24

## 2022-01-01 RX ADMIN — LORAZEPAM 0.5 MG: 2 LIQUID ORAL at 16:46

## 2022-01-01 RX ADMIN — LORAZEPAM 0.5 MG: 2 LIQUID ORAL at 21:11

## 2022-01-01 RX ADMIN — ESCITALOPRAM 10 MG: 5 TABLET, FILM COATED ORAL at 08:12

## 2022-01-01 RX ADMIN — ESCITALOPRAM 10 MG: 5 TABLET, FILM COATED ORAL at 10:08

## 2022-01-01 RX ADMIN — ESCITALOPRAM 10 MG: 5 TABLET, FILM COATED ORAL at 09:54

## 2022-01-01 RX ADMIN — GABAPENTIN 600 MG: 300 CAPSULE ORAL at 20:24

## 2022-01-01 RX ADMIN — LORAZEPAM 0.5 MG: 2 LIQUID ORAL at 00:30

## 2022-01-01 RX ADMIN — LORAZEPAM 0.5 MG: 2 LIQUID ORAL at 04:33

## 2022-01-01 RX ADMIN — LORAZEPAM 0.5 MG: 2 LIQUID ORAL at 21:24

## 2022-01-01 RX ADMIN — TRAZODONE HYDROCHLORIDE 50 MG: 50 TABLET ORAL at 20:54

## 2022-01-01 RX ADMIN — LORAZEPAM 0.5 MG: 2 LIQUID ORAL at 17:16

## 2022-01-01 RX ADMIN — FERROUS SULFATE TAB 325 MG (65 MG ELEMENTAL FE) 325 MG: 325 (65 FE) TAB at 09:17

## 2022-01-01 RX ADMIN — PIPERACILLIN AND TAZOBACTAM 3.38 G: 3; .375 INJECTION, POWDER, LYOPHILIZED, FOR SOLUTION INTRAVENOUS at 20:06

## 2022-01-01 RX ADMIN — LORAZEPAM 0.5 MG: 2 LIQUID ORAL at 12:51

## 2022-01-01 RX ADMIN — LORAZEPAM 0.5 MG: 2 LIQUID ORAL at 16:24

## 2022-01-01 RX ADMIN — LORAZEPAM 0.5 MG: 2 LIQUID ORAL at 04:11

## 2022-01-01 RX ADMIN — TRAZODONE HYDROCHLORIDE 50 MG: 50 TABLET ORAL at 21:11

## 2022-01-01 RX ADMIN — LORAZEPAM 0.5 MG: 2 LIQUID ORAL at 04:57

## 2022-01-01 RX ADMIN — ANORECTAL OINTMENT: 15.7; .44; 24; 20.6 OINTMENT TOPICAL at 10:57

## 2022-01-01 RX ADMIN — GABAPENTIN 600 MG: 300 CAPSULE ORAL at 21:11

## 2022-01-01 RX ADMIN — ESCITALOPRAM 10 MG: 5 TABLET, FILM COATED ORAL at 16:55

## 2022-01-01 RX ADMIN — TRAZODONE HYDROCHLORIDE 50 MG: 50 TABLET ORAL at 22:31

## 2022-01-01 RX ADMIN — LORAZEPAM 0.5 MG: 2 LIQUID ORAL at 21:02

## 2022-01-01 RX ADMIN — GABAPENTIN 600 MG: 300 CAPSULE ORAL at 19:49

## 2022-01-01 RX ADMIN — DONEPEZIL HYDROCHLORIDE 10 MG: 5 TABLET, FILM COATED ORAL at 21:35

## 2022-01-01 RX ADMIN — TRAZODONE HYDROCHLORIDE 50 MG: 50 TABLET ORAL at 20:09

## 2022-01-01 RX ADMIN — LORAZEPAM 0.5 MG: 2 LIQUID ORAL at 12:18

## 2022-01-01 RX ADMIN — PIPERACILLIN AND TAZOBACTAM 3.38 G: 3; .375 INJECTION, POWDER, LYOPHILIZED, FOR SOLUTION INTRAVENOUS at 00:33

## 2022-01-01 RX ADMIN — TRAZODONE HYDROCHLORIDE 50 MG: 50 TABLET ORAL at 20:24

## 2022-01-01 RX ADMIN — LORAZEPAM 0.5 MG: 2 LIQUID ORAL at 01:03

## 2022-01-01 RX ADMIN — METOPROLOL TARTRATE 12.5 MG: 25 TABLET, FILM COATED ORAL at 09:17

## 2022-01-01 RX ADMIN — LORAZEPAM 0.5 MG: 2 LIQUID ORAL at 20:54

## 2022-01-01 RX ADMIN — ESCITALOPRAM 10 MG: 5 TABLET, FILM COATED ORAL at 09:13

## 2022-01-01 RX ADMIN — TRAZODONE HYDROCHLORIDE 50 MG: 50 TABLET ORAL at 21:24

## 2022-01-01 RX ADMIN — DONEPEZIL HYDROCHLORIDE 10 MG: 5 TABLET, FILM COATED ORAL at 21:18

## 2022-01-01 RX ADMIN — LORAZEPAM 0.5 MG: 2 LIQUID ORAL at 17:18

## 2022-01-01 RX ADMIN — LORAZEPAM 0.5 MG: 2 LIQUID ORAL at 13:00

## 2022-01-01 RX ADMIN — LORAZEPAM 0.5 MG: 2 LIQUID ORAL at 09:13

## 2022-01-01 RX ADMIN — SODIUM CHLORIDE: 9 INJECTION, SOLUTION INTRAVENOUS at 20:29

## 2022-01-01 RX ADMIN — GABAPENTIN 600 MG: 300 CAPSULE ORAL at 20:09

## 2022-01-01 RX ADMIN — GABAPENTIN 600 MG: 300 CAPSULE ORAL at 20:54

## 2022-01-01 RX ADMIN — ROSUVASTATIN CALCIUM 10 MG: 10 TABLET, FILM COATED ORAL at 20:09

## 2022-01-01 RX ADMIN — LORAZEPAM 0.5 MG: 2 LIQUID ORAL at 00:21

## 2022-01-01 RX ADMIN — LORAZEPAM 0.5 MG: 2 LIQUID ORAL at 00:33

## 2022-01-01 RX ADMIN — GABAPENTIN 600 MG: 300 CAPSULE ORAL at 22:31

## 2022-01-01 RX ADMIN — TRAZODONE HYDROCHLORIDE 50 MG: 50 TABLET ORAL at 21:03

## 2022-01-01 RX ADMIN — SODIUM CHLORIDE: 9 INJECTION, SOLUTION INTRAVENOUS at 06:56

## 2022-01-01 RX ADMIN — ESCITALOPRAM 10 MG: 5 TABLET, FILM COATED ORAL at 09:34

## 2022-01-01 RX ADMIN — LORAZEPAM 0.5 MG: 2 LIQUID ORAL at 08:31

## 2022-01-01 RX ADMIN — CEFTRIAXONE SODIUM 1 G: 1 INJECTION, POWDER, FOR SOLUTION INTRAMUSCULAR; INTRAVENOUS at 14:59

## 2022-01-01 RX ADMIN — LORAZEPAM 0.5 MG: 2 LIQUID ORAL at 08:50

## 2022-01-01 RX ADMIN — LORAZEPAM 0.5 MG: 2 LIQUID ORAL at 03:53

## 2022-01-01 RX ADMIN — LORAZEPAM 0.5 MG: 2 LIQUID ORAL at 00:02

## 2022-01-01 RX ADMIN — ESCITALOPRAM 10 MG: 5 TABLET, FILM COATED ORAL at 08:32

## 2022-01-01 RX ADMIN — LORAZEPAM 0.5 MG: 2 LIQUID ORAL at 20:25

## 2022-01-01 RX ADMIN — LORAZEPAM 0.5 MG: 2 LIQUID ORAL at 01:05

## 2022-01-01 RX ADMIN — PIPERACILLIN AND TAZOBACTAM 3.38 G: 3; .375 INJECTION, POWDER, LYOPHILIZED, FOR SOLUTION INTRAVENOUS at 19:24

## 2022-01-01 RX ADMIN — ESCITALOPRAM 10 MG: 5 TABLET, FILM COATED ORAL at 08:50

## 2022-01-01 RX ADMIN — DONEPEZIL HYDROCHLORIDE 10 MG: 5 TABLET, FILM COATED ORAL at 20:14

## 2022-01-01 RX ADMIN — Medication 1 MG: at 20:15

## 2022-01-01 RX ADMIN — SODIUM CHLORIDE 1000 ML: 9 INJECTION, SOLUTION INTRAVENOUS at 10:23

## 2022-01-01 RX ADMIN — ESCITALOPRAM 10 MG: 5 TABLET, FILM COATED ORAL at 09:16

## 2022-01-01 RX ADMIN — IOPAMIDOL 75 ML: 755 INJECTION, SOLUTION INTRAVENOUS at 11:34

## 2022-01-01 RX ADMIN — PIPERACILLIN AND TAZOBACTAM 3.38 G: 3; .375 INJECTION, POWDER, LYOPHILIZED, FOR SOLUTION INTRAVENOUS at 09:18

## 2022-01-01 RX ADMIN — SODIUM CHLORIDE: 9 INJECTION, SOLUTION INTRAVENOUS at 15:00

## 2022-01-01 RX ADMIN — GABAPENTIN 600 MG: 300 CAPSULE ORAL at 21:03

## 2022-01-01 RX ADMIN — LORAZEPAM 0.5 MG: 2 LIQUID ORAL at 03:45

## 2022-01-01 RX ADMIN — PIPERACILLIN AND TAZOBACTAM 3.38 G: 3; .375 INJECTION, POWDER, LYOPHILIZED, FOR SOLUTION INTRAVENOUS at 00:31

## 2022-01-01 RX ADMIN — LORAZEPAM 0.5 MG: 2 LIQUID ORAL at 19:35

## 2022-01-01 RX ADMIN — LORAZEPAM 0.5 MG: 2 LIQUID ORAL at 13:18

## 2022-01-01 SDOH — ECONOMIC STABILITY: FOOD INSECURITY: WITHIN THE PAST 12 MONTHS, THE FOOD YOU BOUGHT JUST DIDN'T LAST AND YOU DIDN'T HAVE MONEY TO GET MORE.: NEVER TRUE

## 2022-01-01 SDOH — ECONOMIC STABILITY: FOOD INSECURITY: WITHIN THE PAST 12 MONTHS, YOU WORRIED THAT YOUR FOOD WOULD RUN OUT BEFORE YOU GOT MONEY TO BUY MORE.: NEVER TRUE

## 2022-01-01 SDOH — ECONOMIC STABILITY: TRANSPORTATION INSECURITY
IN THE PAST 12 MONTHS, HAS THE LACK OF TRANSPORTATION KEPT YOU FROM MEDICAL APPOINTMENTS OR FROM GETTING MEDICATIONS?: NO

## 2022-01-01 SDOH — ECONOMIC STABILITY: TRANSPORTATION INSECURITY
IN THE PAST 12 MONTHS, HAS LACK OF TRANSPORTATION KEPT YOU FROM MEETINGS, WORK, OR FROM GETTING THINGS NEEDED FOR DAILY LIVING?: NO

## 2022-01-01 SDOH — ECONOMIC STABILITY: INCOME INSECURITY: IN THE LAST 12 MONTHS, WAS THERE A TIME WHEN YOU WERE NOT ABLE TO PAY THE MORTGAGE OR RENT ON TIME?: NO

## 2022-01-01 ASSESSMENT — ACTIVITIES OF DAILY LIVING (ADL)
WALKING_OR_CLIMBING_STAIRS_DIFFICULTY: YES
ADLS_ACUITY_SCORE: 59
ADLS_ACUITY_SCORE: 57
CURRENT_FUNCTION: BATHING REQUIRES ASSISTANCE
ADLS_ACUITY_SCORE: 66
ADLS_ACUITY_SCORE: 66
ADLS_ACUITY_SCORE: 37
TOILETING_ISSUES: YES
ADLS_ACUITY_SCORE: 61
ADLS_ACUITY_SCORE: 65
ADLS_ACUITY_SCORE: 66
ADLS_ACUITY_SCORE: 66
WERE_AUXILIARY_AIDS_OFFERED?: NO
CURRENT_FUNCTION: MEDICATION ADMINISTRATION REQUIRES ASSISTANCE
ADLS_ACUITY_SCORE: 47
ADLS_ACUITY_SCORE: 57
DIFFICULTY_EATING/SWALLOWING: NO
DEPENDENT_IADLS:: CLEANING;COOKING;LAUNDRY;SHOPPING;MEAL PREPARATION;MEDICATION MANAGEMENT;TRANSPORTATION;MONEY MANAGEMENT;INCONTINENCE
DEPENDENT_IADLS:: CLEANING;COOKING;LAUNDRY;SHOPPING;MEAL PREPARATION;MEDICATION MANAGEMENT;MONEY MANAGEMENT;TRANSPORTATION;INCONTINENCE
TOILETING: 1-->ASSISTANCE (EQUIPMENT/PERSON) NEEDED (NOT DEVELOPMENTALLY APPROPRIATE)
ADLS_ACUITY_SCORE: 57
WALKING_OR_CLIMBING_STAIRS: AMBULATION DIFFICULTY, ASSISTANCE 1 PERSON;STAIR CLIMBING DIFFICULTY, DEPENDENT;TRANSFERRING DIFFICULTY, ASSISTANCE 1 PERSON
ADLS_ACUITY_SCORE: 65
ADLS_ACUITY_SCORE: 66
PATIENT'S_PREFERRED_MEANS_OF_COMMUNICATION: VERBAL
ADLS_ACUITY_SCORE: 53
ADLS_ACUITY_SCORE: 66
ADLS_ACUITY_SCORE: 37
ADLS_ACUITY_SCORE: 37
ADLS_ACUITY_SCORE: 54
ADLS_ACUITY_SCORE: 66
ADLS_ACUITY_SCORE: 62
ADLS_ACUITY_SCORE: 57
ADLS_ACUITY_SCORE: 65
ADLS_ACUITY_SCORE: 66
ADLS_ACUITY_SCORE: 65
ADLS_ACUITY_SCORE: 53
ADLS_ACUITY_SCORE: 66
ADLS_ACUITY_SCORE: 61
ADLS_ACUITY_SCORE: 59
THE_FOLLOWING_AIDS_WERE_PROVIDED;: PATIENT DECLINED OFFER OF AUXILIARY AIDS
ADLS_ACUITY_SCORE: 57
CONCENTRATING,_REMEMBERING_OR_MAKING_DECISIONS_DIFFICULTY: YES
CURRENT_FUNCTION: PREPARING MEALS REQUIRES ASSISTANCE
ADLS_ACUITY_SCORE: 54
ADLS_ACUITY_SCORE: 53
ADLS_ACUITY_SCORE: 69
COMMUNICATION: DIFFICULTY UNDERSTANDING
ADLS_ACUITY_SCORE: 59
ADLS_ACUITY_SCORE: 66
ADLS_ACUITY_SCORE: 66
TOILETING: 2-->COMPLETELY DEPENDENT
ADLS_ACUITY_SCORE: 57
ADLS_ACUITY_SCORE: 61
ADLS_ACUITY_SCORE: 66
EQUIPMENT_CURRENTLY_USED_AT_HOME: WALKER, ROLLING
ADLS_ACUITY_SCORE: 53
DEPENDENT_IADLS:: CLEANING;COOKING;LAUNDRY;SHOPPING;MEAL PREPARATION;MEDICATION MANAGEMENT;TRANSPORTATION;MONEY MANAGEMENT;INCONTINENCE
ADLS_ACUITY_SCORE: 63
ADLS_ACUITY_SCORE: 61
TOILETING_ISSUES: YES
DOING_ERRANDS_INDEPENDENTLY_DIFFICULTY: YES
ADLS_ACUITY_SCORE: 59
ADLS_ACUITY_SCORE: 53
ADLS_ACUITY_SCORE: 66
ADLS_ACUITY_SCORE: 57
ADLS_ACUITY_SCORE: 66
WALKING_OR_CLIMBING_STAIRS: AMBULATION DIFFICULTY, REQUIRES EQUIPMENT;STAIR CLIMBING DIFFICULTY, ASSISTANCE 1 PERSON
ADLS_ACUITY_SCORE: 59
WEAR_GLASSES_OR_BLIND: NO
ADLS_ACUITY_SCORE: 55
DOING_ERRANDS_INDEPENDENTLY_DIFFICULTY: YES
ADLS_ACUITY_SCORE: 62
ADLS_ACUITY_SCORE: 59
ADLS_ACUITY_SCORE: 53
ADLS_ACUITY_SCORE: 61
ADLS_ACUITY_SCORE: 61
BATHING: 2-->COMPLETELY DEPENDENT (NOT DEVELOPMENTALLY APPROPRIATE)
WALKING_OR_CLIMBING_STAIRS: STAIR CLIMBING DIFFICULTY, ASSISTANCE 1 PERSON
CURRENT_FUNCTION: TELEPHONE REQUIRES ASSISTANCE
ADLS_ACUITY_SCORE: 54
DRESSING/BATHING_MANAGEMENT: FAMILY ASSIST
ADLS_ACUITY_SCORE: 66
ADLS_ACUITY_SCORE: 62
WEAR_GLASSES_OR_BLIND: NO
CHANGE_IN_FUNCTIONAL_STATUS_SINCE_ONSET_OF_CURRENT_ILLNESS/INJURY: YES
TOILETING_ISSUES: YES
COMMUNICATION: DIFFICULTY UNDERSTANDING
ADLS_ACUITY_SCORE: 57
TRANSFERRING: 2-->COMPLETELY DEPENDENT (NOT DEVELOPMENTALLY APPROPRIATE)
ADLS_ACUITY_SCORE: 66
CURRENT_FUNCTION: LAUNDRY REQUIRES ASSISTANCE
CURRENT_FUNCTION: TRANSPORTATION REQUIRES ASSISTANCE
ADLS_ACUITY_SCORE: 54
ADLS_ACUITY_SCORE: 69
TOILETING_ASSISTANCE: TOILETING DIFFICULTY, DEPENDENT
ADLS_ACUITY_SCORE: 57
ADLS_ACUITY_SCORE: 59
ADLS_ACUITY_SCORE: 66
DIFFICULTY_EATING/SWALLOWING: NO
ADLS_ACUITY_SCORE: 53
DIFFICULTY_COMMUNICATING: YES
ADLS_ACUITY_SCORE: 66
ADLS_ACUITY_SCORE: 66
ADLS_ACUITY_SCORE: 47
ADLS_ACUITY_SCORE: 66
ADLS_ACUITY_SCORE: 66
WALKING_OR_CLIMBING_STAIRS_DIFFICULTY: YES
ADLS_ACUITY_SCORE: 66
ADLS_ACUITY_SCORE: 57
ADLS_ACUITY_SCORE: 69
ADLS_ACUITY_SCORE: 59
CONCENTRATING,_REMEMBERING_OR_MAKING_DECISIONS_DIFFICULTY: YES
ADLS_ACUITY_SCORE: 66
ADLS_ACUITY_SCORE: 63
ADLS_ACUITY_SCORE: 35
DRESSING/BATHING: BATHING DIFFICULTY, DEPENDENT;DRESSING DIFFICULTY, DEPENDENT
ADLS_ACUITY_SCORE: 66
DIFFICULTY_COMMUNICATING: YES
ADLS_ACUITY_SCORE: 54
DESCRIBE_HEARING_LOSS: BILATERAL HEARING LOSS
ADLS_ACUITY_SCORE: 66
ADLS_ACUITY_SCORE: 55
ADLS_ACUITY_SCORE: 57
ADLS_ACUITY_SCORE: 47
CHANGE_IN_FUNCTIONAL_STATUS_SINCE_ONSET_OF_CURRENT_ILLNESS/INJURY: NO
ADLS_ACUITY_SCORE: 55
ADLS_ACUITY_SCORE: 53
ADLS_ACUITY_SCORE: 57
DRESSING/BATHING_DIFFICULTY: YES
ADLS_ACUITY_SCORE: 59
DRESS: 1-->ASSISTANCE (EQUIPMENT/PERSON) NEEDED (NOT DEVELOPMENTALLY APPROPRIATE)
ADLS_ACUITY_SCORE: 53
ADLS_ACUITY_SCORE: 57
DRESSING/BATHING: BATHING DIFFICULTY, ASSISTANCE 1 PERSON;DRESSING DIFFICULTY, ASSISTANCE 1 PERSON
ADLS_ACUITY_SCORE: 66
ADLS_ACUITY_SCORE: 37
ADLS_ACUITY_SCORE: 57
TOILETING_ASSISTANCE: TOILETING DIFFICULTY, DEPENDENT
ADLS_ACUITY_SCORE: 37
ADLS_ACUITY_SCORE: 59
ADLS_ACUITY_SCORE: 59
CONCENTRATING,_REMEMBERING_OR_MAKING_DECISIONS_DIFFICULTY: YES
DRESS: 2-->COMPLETELY DEPENDENT
ADLS_ACUITY_SCORE: 69
DIFFICULTY_EATING/SWALLOWING: NO
ADLS_ACUITY_SCORE: 66
ADLS_ACUITY_SCORE: 66
HEARING_DIFFICULTY_OR_DEAF: YES
TOILETING_ASSISTANCE: TOILETING DIFFICULTY, ASSISTANCE 1 PERSON
ADLS_ACUITY_SCORE: 54
EQUIPMENT_CURRENTLY_USED_AT_HOME: WALKER, ROLLING
ADLS_ACUITY_SCORE: 61
ADLS_ACUITY_SCORE: 53
ADLS_ACUITY_SCORE: 53
ADLS_ACUITY_SCORE: 62
ADLS_ACUITY_SCORE: 57
CURRENT_FUNCTION: MONEY MANAGEMENT REQUIRES ASSISTANCE
ADLS_ACUITY_SCORE: 57
DRESSING/BATHING_MANAGEMENT: FAMILY ASSIST
ADLS_ACUITY_SCORE: 66
ADLS_ACUITY_SCORE: 69
ADLS_ACUITY_SCORE: 53
ADLS_ACUITY_SCORE: 35
TRANSFERRING: 2-->COMPLETELY DEPENDENT
ADLS_ACUITY_SCORE: 54
DRESSING/BATHING: BATHING DIFFICULTY, DEPENDENT;DRESSING DIFFICULTY, DEPENDENT
CURRENT_FUNCTION: SHOPPING REQUIRES ASSISTANCE
DOING_ERRANDS_INDEPENDENTLY_DIFFICULTY: YES
ADLS_ACUITY_SCORE: 65
ADLS_ACUITY_SCORE: 63
ADLS_ACUITY_SCORE: 63
CURRENT_FUNCTION: HOUSEWORK REQUIRES ASSISTANCE
WEAR_GLASSES_OR_BLIND: NO
ADLS_ACUITY_SCORE: 66
DRESSING/BATHING_DIFFICULTY: YES
ADLS_ACUITY_SCORE: 57
ADLS_ACUITY_SCORE: 47
ADLS_ACUITY_SCORE: 45
ADLS_ACUITY_SCORE: 66
FALL_HISTORY_WITHIN_LAST_SIX_MONTHS: YES
WALKING_OR_CLIMBING_STAIRS_DIFFICULTY: YES
ADLS_ACUITY_SCORE: 54
DRESSING/BATHING_DIFFICULTY: YES
ADLS_ACUITY_SCORE: 53

## 2022-01-01 ASSESSMENT — ENCOUNTER SYMPTOMS
ARTHRALGIAS: 1
JOINT SWELLING: 0
ARTHRALGIAS: 1
MYALGIAS: 0
DYSURIA: 0
HEMATURIA: 0
NERVOUS/ANXIOUS: 0
FREQUENCY: 1
NAUSEA: 1
BLOOD IN STOOL: 1
HEADACHES: 0
HEMATOCHEZIA: 0
DIARRHEA: 0
ABDOMINAL PAIN: 1
PALPITATIONS: 0
DIZZINESS: 0
DIARRHEA: 1
COUGH: 0
CHILLS: 0
VOMITING: 1
COUGH: 0
FEVER: 0
FEVER: 0
SHORTNESS OF BREATH: 0
HEARTBURN: 0
CONSTIPATION: 0
SHORTNESS OF BREATH: 0
ABDOMINAL PAIN: 0
PARESTHESIAS: 0
WEAKNESS: 1
WEAKNESS: 1
BREAST MASS: 0
SORE THROAT: 0

## 2022-01-01 ASSESSMENT — SOCIAL DETERMINANTS OF HEALTH (SDOH): HOW HARD IS IT FOR YOU TO PAY FOR THE VERY BASICS LIKE FOOD, HOUSING, MEDICAL CARE, AND HEATING?: NOT HARD AT ALL

## 2022-04-13 NOTE — PROGRESS NOTES
Clinic Care Coordination Contact  Community Health Worker Initial Outreach    CHW Initial Information Gathering:  Referral Source: PCP  Preferred Hospital: [facility]  [phone]  Preferred Urgent Care: [facility] [place], [phone]  Current living arrangement:: I live in a private home with spouse  Type of residence:: Private home - stairs  Community Resources: None  Informal Support system:: Children, Spouse  No PCP office visit in Past Year: No  Transportation means:: Regular car, Friend  CHW Additional Questions  If ED/Hospital discharge, follow-up appointment scheduled as recommended?: N/A  Medication changes made following ED/Hospital discharge?: N/A  MyChart active?: Yes  Patient sent Social Determinants of Health questionnaire?: No    Patient accepts CC: Yes. Patient scheduled for assessment with CC AHSAN on 4/14/2022 at 3:30pm. Patient noted desire to discuss In home services for her parents, Daughter pranay was able to get patient a cane this week just feels as though her parents may benefit from other services and is not sure where to start and what is covered if anything from her insurance.

## 2022-04-14 NOTE — PROGRESS NOTES
Contact   Chart Review     Situation: Patient chart reviewed by .    Background: Set up for  CC assessment.     Assessment: Talked to daughter Sofy, in media power of  scanned naming Sofy.  She has her mom and dad living with her family.  Mom has dementia and during the pandemic they kept them at home.  She thinks mom would benefit from someone coming to their home and doing some exercises with her so she keeps up her strength. She recently got a walker.   Sofy is unaware of in home services and community services for people with dementia and how they are billed.  Reviewed what Medicare covers.  Discussed in home help that they could hire. Parents have resources to pay for services.  Discussed group respite through [facility] and Sofy was very interested in these options as her mom would enjoy being around people.  Will send via My Chart with CC letter.  Sofy does not think she needs care coordination at this time, but will call if that changes.      She has hired someone to help clean for them and she has started to give mom a shower as well.     Plan/Recommendations: No further outreach scheduled.      Brenda Ohara,   [facility]  [phone]

## 2022-04-14 NOTE — LETTER
[facility] CARE COORDINATION  [facility]    April 14, 2022    Kat Up  [address] [address]      Dear Kat,    I am a clinic care coordinator who works with Ayse Cornejo MD at [facility]. I wanted to thank you for spending the time to talk with me.  Below is a description of clinic care coordination and how I can further assist you.      The clinic care coordination team is made up of a registered nurse,  and community health worker who understand the health care system. The goal of clinic care coordination is to help you manage your health and improve access to the health care system in the most efficient manner. The team can assist you in meeting your health care goals by providing education, coordinating services, strengthening the communication among your providers and supporting you with any resource needs.    Here are the resources.   https://www.[facility].org/what-we-offer/healthy-aging-caregiving-services/connect-caregiver-respite  And   [email or web address]://[email or web address]-services/[email or web address]-resources1/gathering/    Let me know if the links don't work.  Good luck and thanks for providing care for your parents!      Please feel free to contact me at [phone] with any questions or concerns. We are focused on providing you with the highest-quality healthcare experience possible and that all starts with you.     Sincerely,     Brenda Ohara,   [facility]  [phone]

## 2022-04-26 NOTE — TELEPHONE ENCOUNTER
Refill request received from [facility] for escitalopram 10 mg    Pending Prescriptions:                       Disp   Refills    escitalopram (LEXAPRO) 10 MG tablet       90 tab*3            Sig: Take 1 tablet (10 mg) by mouth daily       [facility] - [place], [place] - [address]  [id number] - [place], [place] - [address] AT [facility] [address]

## 2022-08-25 NOTE — PROGRESS NOTES
Contact   Chart Review     Situation: Patient chart reviewed by .    Background: enrolled patient's  in care coordination by talking to daughter, Sofy, on consent to communicate.    Assessment: daughter would like to have care coordination for patient as well.     Plan/Recommendations: Will complete assessment on 8-31 at 9 AM with daughter Sofy.    Brenda Ohara,   [facility]  [phone]
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
## 2022-08-31 NOTE — LETTER
[facility] CARE COORDINATION  [facility]    August 31, 2022    Kat Up  [address] [facility] [address]      Dear Kat/Sofy,        I am a clinic care coordinator who works with Ayse Cornejo MD with the [facility]. I wanted to thank you for spending the time to talk with me.  Below is a description of clinic care coordination and how I can further assist you.       The clinic care coordination team is made up of a registered nurse, , financial resource worker and community health worker who understand the health care system. The goal of clinic care coordination is to help you manage your health and improve access to the health care system. Our team works alongside your provider to assist you in determining your health and social needs. We can help you obtain health care and community resources, providing you with necessary information and education. We can work with you through any barriers and develop a care plan that helps coordinate and strengthen the communication between you and your care team.    Please feel free to contact me with any questions or concerns regarding care coordination and what we can offer.      We are focused on providing you with the highest-quality healthcare experience possible.    Sincerely,     Brenda Ohara,   [facility]  [phone]      Enclosed: I have enclosed a copy of the Patient Centered Plan of Care. This has helpful information and goals that we have talked about. Please keep this in an easy to access place to use as needed.

## 2022-08-31 NOTE — LETTER
[facility]  Patient Centered Plan of Care  About Me:        Patient Name:  Kat Rico    YOB: 1935  Age:         87 year old   Shahida MRN:    [id number] Telephone Information:  Home Phone [phone]   Mobile [phone]       Address:  [address] [place] [id number] Email address:  [email or web address]      Emergency Contact(s)    Name Relationship Lgl Grd Work Phone Home Phone Mobile Phone   1. BIBIANA RICO Spouse  [phone]   2. KRISSYBRSIEIDA* Other   [phone]    3. KRISSYMIKEY Son-in-Law   [phone]            Primary language:  English     needed? No   [facility]:  [phone] op. 1  Other communication barriers:Cognitive impairment    Preferred Method of Communication:     Current living arrangement: I live in a private home with family    Mobility Status/ Medical Equipment: Independent w/Device        Health Maintenance  Health Maintenance Reviewed: Due/Overdue   Health Maintenance Due   Topic Date Due     ZOSTER IMMUNIZATION (2 of 3) 02/15/2011     DTAP/TDAP/TD IMMUNIZATION (2 - Td or Tdap) 12/21/2020     PHQ-2 (once per calendar year)  01/01/2022     COVID-19 Vaccine (4 - Booster for Pfizer series) 01/27/2022     MEDICARE ANNUAL WELLNESS VISIT  03/09/2022     INFLUENZA VACCINE (1) 09/01/2022     Pneumococcal Vaccine: 65+ Years (2 - PPSV23 or PCV20) 09/10/2022           My Access Plan  Medical Emergency 911   Primary Clinic Line [phone]   24 Hour Appointment Line [phone] or  [phone]) (toll-free)   24 Hour Nurse Line [phone] (toll-free)   Preferred Urgent Care [facility] [place], [phone]     Preferred Hospital [facility]  [phone]     Preferred Pharmacy [facility] - [place], [place] - [address]     Behavioral Health Crisis Line The National Suicide Prevention Lifeline at 1-690.397.2925 or  Text/Call 988             My Care Team Members  Patient Care Team       Relationship Specialty Notifications Start End    Ayse Cornejo MD PCP - General   3/23/20     Phone: [phone] Fax: [phone] [address] [facility] [address]    Ayse Cornejo MD Assigned PCP   7/16/21     Phone: [phone] Fax: [phone] [address] [facility] [address]    Brenda Ohara LSW Lead Care Coordinator Primary Care - [facility] Admissions 8/25/22     Phone: [phone]         Marilia Carbajal MA Community Health Worker Primary Care - [facility] Admissions 8/31/22     Marisa Price Community Health Worker  Admissions 8/31/22             My Care Plans  Self Management and Treatment Plan  Care Plan  Care Plan: Social Support     Problem: Inadequate social support     Long-Range Goal: I will review [employer] program, [employer] to see if I want to participate.     Start Date: 8/31/2022 Expected End Date: 2/28/2023    This Visit's Progress: 0%    Priority: Medium    Note:     Barriers: may be waiting lists.   Strengths: Daughter thinks she may benefit from more activities.   Patient expressed understanding of goal: daughter understands.   Action steps to achieve this goal:  1. Daughter will review website for [employer].  2. Daughter will contact [employer] if interested.   3. Daughter will report progress towards this goal at scheduled outreach telephone calls from the [facility] team.                      Care Plan: Housing and Support     Problem: Insufficient In-home support     Goal: Complete Medicare home care and have more in home help.     Start Date: 8/31/2022 Expected End Date: 12/31/2022    This Visit's Progress: 0%    Priority: High    Note:     Barriers: none noted.   Strengths: has appt with PCP on 9-7.   Patient expressed understanding of goal: daughter understands goal.   Action steps to achieve this goal:  1. Daughter will bring patient to appt with PCP.  2. Daughter will work  with home care to start services.   3. Daughter will call private pay home care options to find more help for mornings and evenings for parents.   4. Daughter will report progress towards this goal at scheduled outreach telephone calls from the CCC team.                                       Advance Care Plans/Directives Type:   Advanced Directive - On File      My Medical and Care Information  Problem List   Patient Active Problem List   Diagnosis     Intracranial injury of other and unspecified nature, without mention of open intracranial wound, unspecified state of consciousness     Allergic rhinitis     Venous (peripheral) insufficiency     Irritable bowel syndrome     Enthesopathy     Knee pain     CARDIOVASCULAR SCREENING; LDL GOAL LESS THAN 160     Pain in joint, shoulder region     Tailor's bunion     Advanced directives, counseling/discussion     BPPV (benign paroxysmal positional vertigo)     Chronic bilateral low back pain without sciatica     Generalized anxiety disorder     Hyperlipidemia, unspecified hyperlipidemia type     Mild incontinence     Tremor     Daytime sleepiness      Current Medications and Allergies:   Current Outpatient Medications   Medication Instructions     acetaminophen (TYLENOL) 500 mg, Oral     aspirin (ASA) 81 mg, Oral     calcium carbonate 500 mg (elemental) 200 mg, Oral     cholecalciferol 50 MCG (2000 UT) tablet ONCE DAILY     donepezil (ARICEPT) 10 mg, Oral, AT BEDTIME     escitalopram (LEXAPRO) 10 mg, Oral, DAILY     gabapentin (NEURONTIN) 600 mg, Oral, DAILY     rosuvastatin (CRESTOR) 10 mg, Oral, DAILY         Care Coordination Start Date: 8/25/2022   Frequency of Care Coordination: monthly     Form Last Updated: 08/31/2022

## 2022-09-07 PROBLEM — S82.892D: Status: ACTIVE | Noted: 2020-01-02

## 2022-09-07 PROBLEM — S82.891D: Status: ACTIVE | Noted: 2020-01-02

## 2022-09-07 NOTE — PROGRESS NOTES
"SUBJECTIVE:   Kat Up is a 87 year old female who presents for Preventive Visit.    She has a diagnosis of Alzheimers disease and living at her daughters home who provides care . She does not have any behavioural issues , significant incontinence , falls , is eating and swallowing well   Patient has been advised of split billing requirements and indicates understanding: Yes  Are you in the first 12 months of your Medicare coverage?  No    Healthy Habits:     In general, how would you rate your overall health?  Fair    Frequency of exercise:  1 day/week    Duration of exercise:  Less than 15 minutes    Do you usually eat at least 4 servings of fruit and vegetables a day, include whole grains    & fiber and avoid regularly eating high fat or \"junk\" foods?  Yes    Taking medications regularly:  No    Barriers to taking medications:  Other    Medication side effects:  None    Ability to successfully perform activities of daily living:  Telephone requires assistance, transportation requires assistance, shopping requires assistance, preparing meals requires assistance, housework requires assistance, bathing requires assistance, laundry requires assistance, medication administration requires assistance and money management requires assistance    Home Safety:  No safety concerns identified    Hearing Impairment:  Difficulty following a conversation in a noisy restaurant or crowded room    In the past 6 months, have you been bothered by leaking of urine? Yes    In general, how would you rate your overall mental or emotional health?  Fair      PHQ-2 Total Score: 2    Additional concerns today:  Yes    Do you feel safe in your environment? Unable to ask    Have you ever done Advance Care Planning? (For example, a Health Directive, POLST, or a discussion with a medical provider or your loved ones about your wishes): Yes, advance care planning is on file.       Fall risk  Fallen 2 or more times in the past year?: " No  Any fall with injury in the past year?: No  click delete button to remove this line now  Cognitive Screening Not appropriate due to known dementia    Do you have sleep apnea, excessive snoring or daytime drowsiness?: no    Reviewed and updated as needed this visit by clinical staff   Tobacco  Allergies  Meds                Reviewed and updated as needed this visit by Provider                   Social History     Tobacco Use     Smoking status: Never Smoker     Smokeless tobacco: Never Used   Substance Use Topics     Alcohol use: Not Currently     Comment: white wine  4-6 ounces/day     If you drink alcohol do you typically have >3 drinks per day or >7 drinks per week? No    Alcohol Use 9/7/2022   Prescreen: >3 drinks/day or >7 drinks/week? Yes     AUDIT - Alcohol Use Disorders Identification Test - Reproduced from the World Health Organization Audit 2001 (Second Edition) 9/7/2022   1.  How often do you have a drink containing alcohol? 4 or more times a week   2.  How many drinks containing alcohol do you have on a typical day when you are drinking? 1 or 2   3.  How often do you have five or more drinks on one occasion? Never   9.  Have you or someone else been injured because of your drinking? No   10. Has a relative, friend, doctor or other health care worker been concerned about your drinking or suggested you cut down? Yes, but not in the last year           Patient Active Problem List   Diagnosis     Intracranial injury of other and unspecified nature, without mention of open intracranial wound, unspecified state of consciousness     Allergic rhinitis     Venous (peripheral) insufficiency     Irritable bowel syndrome     Enthesopathy     Knee pain     CARDIOVASCULAR SCREENING; LDL GOAL LESS THAN 160     Pain in joint, shoulder region     Steve's pura     Advanced directives, counseling/discussion     BPPV (benign paroxysmal positional vertigo)     Chronic bilateral low back pain without sciatica      Generalized anxiety disorder     Hyperlipidemia, unspecified hyperlipidemia type     Mild incontinence     Tremor     Daytime sleepiness     Closed fracture of both ankles with routine healing, subsequent encounter     Current Outpatient Medications   Medication     acetaminophen (TYLENOL) 500 MG tablet     aspirin (ASA) 81 MG chewable tablet     Blood Pressure Monitoring (BP MONITOR-STETHOSCOPE) KIT     Blood Pressure Monitoring (BP MONITOR-STETHOSCOPE) KIT     calcium carbonate 1250 (500 Ca) MG CHEW     cholecalciferol 50 MCG (2000 UT) tablet     donepezil (ARICEPT) 10 MG tablet     escitalopram (LEXAPRO) 10 MG tablet     gabapentin (NEURONTIN) 600 MG tablet     rosuvastatin (CRESTOR) 10 MG tablet     No current facility-administered medications for this visit.         Current providers sharing in care for this patient include:   Patient Care Team:  Ayse Cornejo MD as PCP - General  Ayse Cornejo MD as Assigned PCP  Brenda Ohara LSW as Lead Care Coordinator (Primary Care - CC)  Marilia Carbajal MA as Community Health Worker (Primary Care - CC)  Marisa Price as Community Health Worker    The following health maintenance items are reviewed in Epic and correct as of today:  Health Maintenance Due   Topic Date Due     ANNUAL REVIEW OF HM ORDERS  Never done     ZOSTER IMMUNIZATION (2 of 3) 02/15/2011     DTAP/TDAP/TD IMMUNIZATION (2 - Td or Tdap) 12/21/2020     COVID-19 Vaccine (4 - Booster for Pfizer series) 01/27/2022     MEDICARE ANNUAL WELLNESS VISIT  03/09/2022     INFLUENZA VACCINE (1) 09/01/2022     Pneumococcal Vaccine: 65+ Years (2 - PPSV23 or PCV20) 09/10/2022             Pertinent mammograms are reviewed under the imaging tab.    Review of Systems   Constitutional: Negative for chills and fever.   HENT: Negative for congestion, ear pain, hearing loss and sore throat.    Eyes: Negative for visual disturbance.   Respiratory: Negative for cough and shortness of breath.    Cardiovascular: Negative  "for chest pain, palpitations and peripheral edema.   Gastrointestinal: Positive for nausea. Negative for abdominal pain, constipation, diarrhea, heartburn and hematochezia.   Breasts:  Negative for tenderness, breast mass and discharge.   Genitourinary: Positive for frequency. Negative for dysuria, genital sores, hematuria, pelvic pain, urgency, vaginal bleeding and vaginal discharge.   Musculoskeletal: Positive for arthralgias. Negative for joint swelling and myalgias.   Skin: Negative for rash.   Neurological: Positive for weakness. Negative for dizziness, headaches and paresthesias.   Psychiatric/Behavioral: Positive for mood changes. The patient is not nervous/anxious.          OBJECTIVE:   /64 (BP Location: Left arm, Patient Position: Sitting, Cuff Size: Adult Regular)   Pulse 120   Temp 98.5  F (36.9  C) (Tympanic)   Resp 16   Ht 1.568 m (5' 1.75\")   Wt 78.6 kg (173 lb 3.2 oz)   SpO2 97%   BMI 31.94 kg/m   Estimated body mass index is 31.94 kg/m  as calculated from the following:    Height as of this encounter: 1.568 m (5' 1.75\").    Weight as of this encounter: 78.6 kg (173 lb 3.2 oz).  Physical Exam  GENERAL: healthy, alert and no distress  EYES: Eyes grossly normal to inspection, PERRL and conjunctivae and sclerae normal  HENT: ear canals and TM's normal, nose and mouth without ulcers or lesions  NECK: no adenopathy, no asymmetry, masses, or scars and thyroid normal to palpation  RESP: lungs clear to auscultation - no rales, rhonchi or wheezes  BREAST: normal without masses, tenderness or nipple discharge and no palpable axillary masses or adenopathy  CV: regular rate but there is tachycardia with a rate of 120  normal S1 S2, no S3 or S4, no murmur, click or rub, no peripheral edema and peripheral pulses strong  ABDOMEN: soft, nontender, no hepatosplenomegaly, no masses and bowel sounds normal  MS: no gross musculoskeletal defects noted, no edema  SKIN: no suspicious lesions or rashes  NEURO: " Normal strength and tone, mentation intact and speech normal  PSYCH: mentation appears normal, affect normal/bright        ASSESSMENT / PLAN:   (Z78.0) Asymptomatic menopausal state  (primary encounter diagnosis)  Comment:  She is agreeable to getting any test done that is needed   Plan: DX Hip/Pelvis/Spine, CBC with platelets, Home         Blood Pressure Monitor Order for DME - ONLY FOR        DME, Blood Pressure Monitoring (BP         MONITOR-STETHOSCOPE) KIT            (R00.0) Tachycardia  Comment: she has absolutely no symptoms , blood pressure is low normal but there is no orthostasis   EKG reviewed and I do see p waves but they appear hidden at times and at different intervals . It looks like an AV alton reentrant tachycardia . She is hemodynamically stable but heart rate is elevated and should be addressed soon , bllood pressure is low and precludes beta blocker/calcium channel use . Will get urgent cardiology evaluation .   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 Recommend home safety assessment and OT/PT evaluation   Plan: Home Care Referral            (R26.89) Balance disorder  Comment:   Plan: Home Care Referral              Overall is doing really well , except for the incidental finding of an atrial tachycardia   She is willing to get pneumonia and flu shot   COUNSELING:  Reviewed preventive health counseling, as reflected in patient instructions    Estimated body mass index is 31.94 kg/m  as calculated from the following:    Height as of this  "encounter: 1.568 m (5' 1.75\").    Weight as of this encounter: 78.6 kg (173 lb 3.2 oz).        She reports that she has never smoked. She has never used smokeless tobacco.      Appropriate preventive services were discussed with this patient, including applicable screening as appropriate for cardiovascular disease, diabetes, osteopenia/osteoporosis, and glaucoma.  As appropriate for age/gender, discussed screening for colorectal cancer, prostate cancer, breast cancer, and cervical cancer. Checklist reviewing preventive services available has been given to the patient.    Reviewed patients plan of care and provided an AVS. The Basic Care Plan (routine screening as documented in Health Maintenance) for Kat meets the Care Plan requirement. This Care Plan has been established and reviewed with the Patient.    Counseling Resources:  ATP IV Guidelines  Pooled Cohorts Equation Calculator  Breast Cancer Risk Calculator  Breast Cancer: Medication to Reduce Risk  FRAX Risk Assessment  ICSI Preventive Guidelines  Dietary Guidelines for Americans, 2010  [place]'s MyPlate  ASA Prophylaxis  Lung CA Screening    Ayse Cornejo MD  [facility]    Identified Health Risks:  "

## 2022-09-08 NOTE — TELEPHONE ENCOUNTER
General Call      Reason for Call:  [facility] calling in and they are not able to take patient on for home care services.    Please refer elsewhere.

## 2022-09-08 NOTE — PROGRESS NOTES
[facility] Patient Outreach:     Discharge Date::  3/17/20      Reason for TCU stay (discharge diagnosis)::  Bilateral ankle fx's      Are you feeling better, the same or worse since your discharge?:  Patient is feeling better          As part of your discharge plan, did they discuss home care with you?: Yes        Have your seen them yet, or are they scheduled to visit?: Yes                Do you have any follow up visits scheduled with your PCP or Specialist?:  Yes, with PCP      (RN) Is it scheduled soon enough (3-5 days)?: Yes                    
08-Sep-2022 21:21

## 2022-09-09 NOTE — TELEPHONE ENCOUNTER
Called and spoke to daughter.  EKG official reading says atrial flutter with a AV conduction block.  That does make sense because P waves were visualized.  I still am not sure if this could represent a reentry tachycardia.  At any rate, she is very happy she is hemodynamically stable.  Heart rate has not gone above 117 at home.  She has an appointment set up with cardiology with they can review this.  He was told that she might would qualify for anticoagulation if this is truly atrial flutter.  Other the other concern is the sharp drop in hemoglobin.  She has again no symptoms.  Recommend iron supplementation and fit test stool test.  Nutritional indices indicate mild iron deficiency.  Differential diagnosis includes GI blood loss or myelo dysplasia or bone marrow suppression.  At this stage given her age and underlying dementia sending her for endoscopy colonoscopy is not a reasonable option until absolutely necessary.  Repeat CBC in 3 weeks.

## 2022-09-12 NOTE — PATIENT INSTRUCTIONS
Please contact direct nurses line Monday through Friday 8 AM to 5 PM @ [phone]    After-hours contact cardiology office at [phone].
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
## 2022-09-15 NOTE — TELEPHONE ENCOUNTER
----- Message from Ayse Cornejo MD sent at 9/14/2022 10:25 PM CDT -----  Please call dominique and tell her that patients iron levels were low . I would like her to do a FIT test to check for occult bleeding . They have to  the card from the lab and drop it back . Start iron and reepat blood count in 4 weeks

## 2022-09-15 NOTE — TELEPHONE ENCOUNTER
Attempted to contact patient's daughter to relay information below from Dr Cornejo. No answer. Left message to call clinic.

## 2022-09-15 NOTE — TELEPHONE ENCOUNTER
Spoke with patient's daughter and relayed information below from Dr Cornejo. Sofy verbalized understanding and has no further questions.

## 2022-09-15 NOTE — RESULT ENCOUNTER NOTE
Please call dominique and tell her that patients iron levels were low . I would like her to do a FIT test to check for occult bleeding . They have to  the card from the lab and drop it back . Start iron and reepat blood count in 4 weeks

## 2022-09-19 NOTE — TELEPHONE ENCOUNTER
General Call      Reason for Call:  Graciela from [facility] home care-calling for verbal orders    What are your questions or concerns:    Verbal order:  Continued PT 2 times a week for 4 weeks, 1 time for 3 weeks. For gait, transfer and balance.      Could we [facility] this [facility] you [facility] or would you prefer to receive a phone call?:   Home care nurse  Okay to leave a detailed message?: Yes at Other phone number:  [phone]

## 2022-09-19 NOTE — TELEPHONE ENCOUNTER
The Home Care/Assisted Living/Nursing Facility is calling regarding an established patient.  Has the patient seen Home Care in the past or is currently residing in Assisted Living or Nursing Facility? Yes.     Graciela calling from [facility] requesting the following orders that are within the Home Care, Assisted Living or Nursing Home Eval and Treatment standing order and can be signed as standing order signature required by RN.    Preferred Call Back Number: [phone]    PT/OT/Speech Therapy   Continued PT 2 times a week for 4 weeks, 1 time for 3 weeks. For gait, transfer and balance.    Any additional Orders:  Are there any orders requested, not stated above, that are outside of the standing order and must be routed to a licensed practitioner for approval?    No    Verbal orders left on secure voicemail for Graciela, clinic phone number provided for further questions and fax number provided for orders to be sent to be signed by clinic provider.

## 2022-09-21 NOTE — TELEPHONE ENCOUNTER
Routing refill request to provider for review/approval because:  Drug not on the G refill protocol     Last Written Prescription Date:  9/10/21  Last Fill Quantity: 90,  # refills: 3   Last office visit provider:  9/7/22     Requested Prescriptions   Pending Prescriptions Disp Refills     gabapentin (NEURONTIN) 600 MG tablet 90 tablet 3     Sig: Take 1 tablet (600 mg) by mouth daily       There is no refill protocol information for this order          Angy Villasenor RN 09/21/22 2:38 PM

## 2022-09-23 NOTE — TELEPHONE ENCOUNTER
Reason for Call:  Home Health Care    Pino with [facility] called regarding (reason for call): verbal orders    Orders are needed for this patient. OT    PT: none    OT: 1x/wk for 5 wks    Skilled Nursing: none    Pt Provider: Clemente    Phone Number Homecare Nurse can be reached at: [phone]    Can we leave a detailed message on this number? YES    Phone number patient can be reached at: Home number on file [phone] (home)    Best Time: anytime    Call taken on 9/23/2022 at 2:18 PM by Donald Ruelas

## 2022-09-29 NOTE — TELEPHONE ENCOUNTER
The Home Care/Assisted Living/Nursing Facility is calling regarding an established patient.  Has the patient seen Home Care in the past or is currently residing in Assisted Living or Nursing Facility? No.     Pino calling from [facility] requesting the following orders that are NOT within the Home Care, Assisted Living or Nursing Home Eval and Treatment standing order and must be ordered by a Licensed Practitioner.    Preferred Call Back Number: [phone]    PT/OT/Speech Therapy    OT: 1x/wk for 5 wks    Routing to Licensed Practitioner (Provider) to review request and provide approval or recommendation.    Dr. Cornejo gave okay to give verbal orders.     Verbal orders provided to Pino.    Writer has verified Requestor will send fax to have orders signed.

## 2022-10-03 NOTE — PROGRESS NOTES
Clinic Care Coordination Contact    Community Health Worker Follow Up    Care Gaps:     Health Maintenance Due   Topic Date Due     ZOSTER IMMUNIZATION (2 of 3) 02/15/2011     DTAP/TDAP/TD IMMUNIZATION (2 - Td or Tdap) 12/21/2020     COVID-19 Vaccine (4 - Booster for Pfizer series) 11/22/2021         Care Plan:   Care Plan: Social Support     Problem: Inadequate social support     Long-Range Goal: I will review [employer] program, [employer] to see if I want to participate.     Start Date: 8/31/2022 Expected End Date: 2/28/2023    This Visit's Progress: 10% Recent Progress: 0%    Priority: Medium    Note:     Barriers: may be waiting lists.   Strengths: Daughter thinks she may benefit from more activities.   Patient expressed understanding of goal: daughter understands.   Action steps to achieve this goal:  1. Daughter will review website for [employer].  2. Daughter will contact [employer] if interested.   3. Daughter will report progress towards this goal at scheduled outreach telephone calls from the CCC team.    Discussed: 10/3/22                    Care Plan: Housing and Support     Problem: Insufficient In-home support     Goal: Complete Medicare home care and have more in home help.  Completed 10/3/2022    Start Date: 8/31/2022 Expected End Date: 12/31/2022    This Visit's Progress: 100% Recent Progress: 20%    Priority: High    Note:     Barriers: none noted.   Strengths: has appt with PCP on 9-7.   Patient expressed understanding of goal: daughter understands goal.   Action steps to achieve this goal:  1. Daughter will bring patient to appt with PCP.  2. Daughter will work with home care to start services.   3. Daughter will call private pay home care options to find more help for mornings and evenings for parents.   4. Daughter will report progress towards this goal at scheduled outreach telephone calls from the CCC team.     Discussed: 10/3/22                        Intervention and Education  during outreach: CHW gave patients daughter Sofy contact information and encouraged her to reach out with any questions or concerns.    CHW Note:    CHW contacted patients daughter Sofy to review/update CCC goals.     Sofy shared that they got approved for in home support through [employer]. Sofy shared that the patient now has OT coming one day per week and Physical Therapy coming twice per week. Sofy shared this has been very helpful for the patient. With permission from Sofy CHW completed goal surrounding in home support.     Sofy shared that she has a call out for the [employer] program. Sofy states they have nothing set up yet but are in the process. Sofy will update CCC team at next scheduled outreach.    Sofy denied any other needs or concerns but will reach out to CCC as needed.    CHW Plan: CHW will continue to support patient with goals through routine scheduled outreach. CHW will outreach in one month on 11/3/22.      Marilia Carbajal  Community Health Worker   [facility] Care Coordination  [facility]   [email or web address]  Office: [phone]

## 2022-10-13 NOTE — PROGRESS NOTES
Care Coordination Clinician Chart Review     Situation: Patient chart reviewed by care coordinator.?     Background: Initial assessment and enrollment to Care Coordination was 8-.?? Care plan(s) with patient-centered goal(s) were developed with participation from patient.? Lead CC handed patient off to CHW for continued outreach every 30 days.??     Assessment: Per chart review, patient outreach completed by CC CHW on 10-3-2022.? Patient is actively working to accomplish goal(s).? Patient's goal(s) remain(s) appropriate at this time.? Patient is not due for updated Plan of Care.? Annual assessment will be due 8-.     Care Plan: Social Support     Problem: Inadequate social support     Long-Range Goal: I will review [employer] program, [employer] to see if I want to participate.     Start Date: 8/31/2022 Expected End Date: 2/28/2023    This Visit's Progress: 10% Recent Progress: 0%    Priority: Medium    Note:     Barriers: may be waiting lists.   Strengths: Daughter thinks she may benefit from more activities.   Patient expressed understanding of goal: daughter understands.   Action steps to achieve this goal:  1. Daughter will review website for [employer].  2. Daughter will contact [employer] if interested.   3. Daughter will report progress towards this goal at scheduled outreach telephone calls from the CCC team.    Discussed: 10/3/22                    Care Plan: Housing and Support     Problem: Insufficient In-home support     Goal: Complete Medicare home care and have more in home help.  Completed 10/3/2022    Start Date: 8/31/2022 Expected End Date: 12/31/2022    This Visit's Progress: 100% Recent Progress: 20%    Priority: High    Note:     Barriers: none noted.   Strengths: has appt with PCP on 9-7.   Patient expressed understanding of goal: daughter understands goal.   Action steps to achieve this goal:  1. Daughter will bring patient to appt with PCP.  2. Daughter will work with  home care to start services.   3. Daughter will call private pay home care options to find more help for mornings and evenings for parents.   4. Daughter will report progress towards this goal at scheduled outreach telephone calls from the CCC team.     Discussed: 10/3/22                        Plan/Recommendations: The patient will continue working with Care Coordination to achieve above goal(s).? CHW will involve Lead CC as needed or if patient is ready to move to maintenance.? Lead CC will continue to monitor CHW s monthly outreaches and progress to goal(s) every 6 weeks.    Plan of Care updated and sent to patient: No

## 2022-10-26 NOTE — TELEPHONE ENCOUNTER
"Routing refill request to provider for review/approval because:  Labs not current:  LDL    Last Written Prescription:      Last office visit provider:  9/7/22    Requested Prescriptions   Pending Prescriptions Disp Refills     rosuvastatin (CRESTOR) 10 MG tablet 90 tablet 3     Sig: Take 1 tablet (10 mg) by mouth daily       Statins Protocol Failed - 10/25/2022 12:50 PM        Failed - LDL on file in past 12 months     Recent Labs   Lab Test 02/12/20  0452   LDL 56             Passed - No abnormal creatine kinase in past 12 months     No lab results found.             Passed - Recent (12 mo) or future (30 days) visit within the authorizing provider's specialty     Patient has had an office visit with the authorizing provider or a provider within the authorizing providers department within the previous 12 mos or has a future within next 30 days. See \"Patient Info\" tab in inbasket, or \"Choose Columns\" in Meds & Orders section of the refill encounter.              Passed - Medication is active on med list        Passed - Patient is age 18 or older        Passed - No active pregnancy on record        Passed - No positive pregnancy test in past 12 months           donepezil (ARICEPT) 10 MG tablet 90 tablet 3     Sig: Take 1 tablet (10 mg) by mouth At Bedtime       Miscellaneous Dementia Agents Passed - 10/25/2022 12:50 PM        Passed - Recent (12 mo) or future (30 days) visit within the authorizing provider's specialty     Patient has had an office visit with the authorizing provider or a provider within the authorizing providers department within the previous 12 mos or has a future within next 30 days. See \"Patient Info\" tab in inbasket, or \"Choose Columns\" in Meds & Orders section of the refill encounter.              Passed - Medication is active on med list        Passed - Patient is 18 years of age or older             Laurel Dorman RN 10/26/22 5:16 PM  "

## 2022-10-31 NOTE — PROGRESS NOTES
Clinic Care Coordination Contact    Community Health Worker Follow Up    Care Gaps:     Health Maintenance Due   Topic Date Due     HEPATITIS B IMMUNIZATION (1 of 3 - 3-dose series) Never done     ZOSTER IMMUNIZATION (2 of 3) 02/15/2011     DTAP/TDAP/TD IMMUNIZATION (2 - Td or Tdap) 12/21/2020     COVID-19 Vaccine (4 - Booster for Pfizer series) 11/22/2021       Care Gaps Last addressed on 10/31/2022    Care Plan:   Care Plan: Social Support     Problem: Inadequate social support     Long-Range Goal: I will review [employer]'s program, [employer] to see if I want to participate.     Start Date: 8/31/2022 Expected End Date: 2/28/2023    This Visit's Progress: 20% Recent Progress: 10%    Priority: Medium    Note:     Barriers: may be waiting lists.   Strengths: Daughter thinks she may benefit from more activities.   Patient expressed understanding of goal: daughter understands.   Action steps to achieve this goal:  1. Daughter will review website for [employer].  2. Daughter will contact [employer] if interested.   3. Daughter will report progress towards this goal at scheduled outreach telephone calls from the CCC team.    Discussed: 10/31/2022- daughter continues to look into resources and activities to have her mother attend.                    Care Plan: Housing and Support     Problem: Insufficient In-home support     Goal: Complete Medicare home care and have more in home help.  Completed 10/3/2022    Start Date: 8/31/2022 Expected End Date: 12/31/2022    This Visit's Progress: 100% Recent Progress: 20%    Priority: High    Note:     Barriers: none noted.   Strengths: has appt with PCP on 9-7.   Patient expressed understanding of goal: daughter understands goal.   Action steps to achieve this goal:  1. Daughter will bring patient to appt with PCP.  2. Daughter will work with home care to start services.   3. Daughter will call private pay home care options to find more help for mornings and evenings for  parents.   4. Daughter will report progress towards this goal at scheduled outreach telephone calls from the CCC team.     Discussed: 10/3/22                        Intervention and Education during outreach: Supportive Listening, CHW has scheduled follow up CC RN call to discuss medications refills, possible mail order.    CHW     CHW Plan: Continue with monthly outreach call to discuss, support and update CC goal progression    Next CHW outreach call: 11/28/2022    Parisa BLACKMAN  Community Health Worker  [facility] Care Coordination  Fausto Valente [place] [email or web address]  Office: [phone]

## 2022-11-22 NOTE — TELEPHONE ENCOUNTER
Per Erwin, she would like her medications sent to [facility] and pillpacked. Set up for PCP to sign.     I did not set up calcium or tylenol, no current sig.

## 2022-11-25 NOTE — LETTER
[facility] CARE COORDINATION  [facility]    November 25, 2022        Kat Up  [address] [address]          Dear Veronika Stephens is an updated Complex Care Plan for your continued enrollment in Care Coordination. Please let us know if you have additional questions, concerns, or goals that we can assist with.    Sincerely,    Brenda Ohara,   [facility]  [phone]        [facility]  Patient Centered Plan of Care  About Me:        Patient Name:  Kat Up    YOB: 1935  Age:         87 year old   [place] MRN:    [id number] Telephone Information:  Home Phone [phone]   Mobile [phone]       Address:  [address] [place] [address] Email address:  [email or web address]      Emergency Contact(s)    Name Relationship Lgl Grd Work Phone Home Phone Mobile Phone   1. JACKYBIBIANA Spouse  [phone]   2. BRISEIDA REY* Other   [phone]    3. MIKEY REY Son-in-Law   [phone]            Primary language:  English     needed? No   [facility] [employer]:  [phone] op. 1  Other communication barriers:Cognitive impairment    Preferred Method of Communication:     Current living arrangement: I live in a private home with family    Mobility Status/ Medical Equipment: Independent w/Device        Health Maintenance  Health Maintenance Reviewed: Due/Overdue   Health Maintenance Due   Topic Date Due     ZOSTER IMMUNIZATION (2 of 3) 02/15/2011     DTAP/TDAP/TD IMMUNIZATION (2 - Td or Tdap) 12/21/2020     COVID-19 Vaccine (4 - Booster for Pfizer series) 11/22/2021           My Access Plan  Medical Emergency 911   Primary Clinic Line [phone]   24 Hour Appointment Line [phone] or  [phone]) (toll-free)   24 Hour Nurse Line [phone] (toll-free)   Preferred Urgent Care [facility] [place], [phone]      Preferred Hospital [facility]  [phone]     Preferred Pharmacy [facility] [place] [place] [facility] [place], [place] - [address]     Behavioral Health Crisis Line The National Suicide Prevention Lifeline at 1-371.181.1847 or Text/Call 988             My Care Team Members  Patient Care Team       Relationship Specialty Notifications Start End    Ayse Cornejo MD PCP - General   3/23/20     Phone: [phone] Fax: [phone]         [address] [facility] [address]    Ayse Cornejo MD Assigned PCP   7/16/21     Phone: [phone] Fax: [phone] [address] [facility] [address]    Brenda Ohara LSW Lead Care Coordinator Primary Care - CC Admissions 8/25/22     Phone: [phone]         Mario Chan DO Assigned Heart and Vascular Provider   9/17/22     Phone: [phone] Fax: [phone] [address] [facility] [address]    Marisa Price Community Health Worker  Admissions 10/1/22             My Care Plans  Self Management and Treatment Plan  Care Plan  Care Plan: Social Support     Problem: Inadequate social support     Long-Range Goal: I will review [employer]'s program, [employer] to see if I want to participate.     Start Date: 8/31/2022 Expected End Date: 2/28/2023    This Visit's Progress: 20% Recent Progress: 10%    Priority: Medium    Note:     Barriers: may be waiting lists.   Strengths: Daughter thinks she may benefit from more activities.   Patient expressed understanding of goal: daughter understands.   Action steps to achieve this goal:  1. Daughter will review website for [employer].  2. Daughter will contact [employer] if interested.   3. Daughter will report progress towards this goal at scheduled outreach telephone calls from the CCC team.    Discussed: 10/31/2022- daughter continues to look into resources and activities to have her mother attend.                    Care Plan: Housing and Support      Problem: Insufficient In-home support     Goal: Complete Medicare home care and have more in home help.  Completed 10/3/2022    Start Date: 8/31/2022 Expected End Date: 12/31/2022    This Visit's Progress: 100% Recent Progress: 20%    Priority: High    Note:     Barriers: none noted.   Strengths: has appt with PCP on 9-7.   Patient expressed understanding of goal: daughter understands goal.   Action steps to achieve this goal:  1. Daughter will bring patient to appt with PCP.  2. Daughter will work with home care to start services.   3. Daughter will call private pay home care options to find more help for mornings and evenings for parents.   4. Daughter will report progress towards this goal at scheduled outreach telephone calls from the CCC team.     Discussed: 10/3/22                         Action Plans on File:                       Advance Care Plans/Directives Type:   Advanced Directive - On File      My Medical and Care Information  Problem List   Patient Active Problem List   Diagnosis     Intracranial injury of other and unspecified nature, without mention of open intracranial wound, unspecified state of consciousness     Allergic rhinitis     Venous (peripheral) insufficiency     Irritable bowel syndrome     Enthesopathy     Knee pain     CARDIOVASCULAR SCREENING; LDL GOAL LESS THAN 160     Pain in joint, shoulder region     Tailor's bunion     Advanced directives, counseling/discussion     BPPV (benign paroxysmal positional vertigo)     Chronic bilateral low back pain without sciatica     Generalized anxiety disorder     Hyperlipidemia, unspecified hyperlipidemia type     Mild incontinence     Tremor     Daytime sleepiness     Closed fracture of both ankles with routine healing, subsequent encounter      Current Medications and Allergies:    Current Outpatient Medications   Medication Instructions     acetaminophen (TYLENOL) 500 mg, Oral     aspirin (ASA) 81 mg, Oral, EVERY MORNING     Blood Pressure  Monitoring (BP MONITOR-STETHOSCOPE) KIT Check bp daily     calcium carbonate 500 mg (elemental) 200 mg, Oral     cholecalciferol 50 mcg, Oral, EVERY MORNING     donepezil (ARICEPT) 10 mg, Oral, AT BEDTIME     escitalopram (LEXAPRO) 10 mg, Oral, EVERY MORNING     ferrous sulfate (FEROSUL) 325 mg, Oral, DAILY WITH BREAKFAST     gabapentin (NEURONTIN) 600 mg, Oral, DAILY     metoprolol tartrate (LOPRESSOR) 12.5 mg, Oral, 2 TIMES DAILY     rosuvastatin (CRESTOR) 10 mg, Oral, EVERY EVENING         Care Coordination Start Date: 8/25/2022   Frequency of Care Coordination: monthly     Form Last Updated: 11/25/2022

## 2022-11-25 NOTE — PROGRESS NOTES
Care Coordination Clinician Chart Review     Situation: Patient chart reviewed by care coordinator.?     Background: Initial assessment and enrollment to Care Coordination was 8-.?? Care plan(s) with patient-centered goal(s) were developed with participation from patient.? Lead CC handed patient off to CHW for continued outreach every 30 days.??     Assessment: Per chart review, patient outreach completed by CC CHW on 10-.? Patient is actively working to accomplish goal(s).? Patient's goal(s) remain(s) appropriate at this time.? Patient is due for updated Plan of Care.? Annual assessment will be due 8-1-2023.     Care Plan: Social Support     Problem: Inadequate social support     Long-Range Goal: I will review [employer]'s program, [employer] to see if I want to participate.     Start Date: 8/31/2022 Expected End Date: 2/28/2023    This Visit's Progress: 20% Recent Progress: 10%    Priority: Medium    Note:     Barriers: may be waiting lists.   Strengths: Daughter thinks she may benefit from more activities.   Patient expressed understanding of goal: daughter understands.   Action steps to achieve this goal:  1. Daughter will review website for [employer].  2. Daughter will contact [employer] if interested.   3. Daughter will report progress towards this goal at scheduled outreach telephone calls from the CCC team.    Discussed: 10/31/2022- daughter continues to look into resources and activities to have her mother attend.                    Care Plan: Housing and Support     Problem: Insufficient In-home support     Goal: Complete Medicare home care and have more in home help.  Completed 10/3/2022    Start Date: 8/31/2022 Expected End Date: 12/31/2022    This Visit's Progress: 100% Recent Progress: 20%    Priority: High    Note:     Barriers: none noted.   Strengths: has appt with PCP on 9-7.   Patient expressed understanding of goal: daughter understands goal.   Action steps to achieve this  goal:  1. Daughter will bring patient to appt with PCP.  2. Daughter will work with home care to start services.   3. Daughter will call private pay home care options to find more help for mornings and evenings for parents.   4. Daughter will report progress towards this goal at scheduled outreach telephone calls from the CCC team.     Discussed: 10/3/22                        Plan/Recommendations: The patient will continue working with Care Coordination to achieve above goal(s).? CHW will involve Lead CC as needed or if patient is ready to move to maintenance.? Lead CC will continue to monitor CHW s monthly outreaches and progress to goal(s) every 6 weeks.    Plan of Care updated and sent to patient: Yes, Via My Chart

## 2022-11-30 NOTE — PROGRESS NOTES
Clinic Care Coordination Contact  [facility]/Voicemail       Clinical Data: Care Coordinator Outreach  Outreach attempted x 1.  No answer at time of CHW call today to daughter Sofy    Plan: Care Coordinator update goal progression  Care Coordinator will try to reach patient again in 1 week= 12/6/2022.    Parisa BLACKMAN  Community Health Worker  [facility] Care Coordination  [facility], [email or web address]  Office: [phone]

## 2022-12-06 NOTE — PROGRESS NOTES
Clinic Care Coordination Contact  [facility]/Voicemail       Clinical Data: Care Coordinator Outreach  Outreach attempted x 2.  Left message on patient's daughter Sofy voicemail with call back information and requested return call.  Plan: Care Coordinator will send unable to contact letter with care coordinator contact information via [employer]. Care Coordinator will try to reach patient again in 3 weeks= 12/27/2022.    Parisa BLACKMAN  Community Health Worker  [facility] Care Coordination  [facility], [email or web address]  Office: [phone]

## 2022-12-06 NOTE — LETTER
[facility] CARE COORDINATION  [address] [facility] [place] [address]    December 6, 2022    Kat Up  [address] [facility] [address]      Dear Kat,    I have been attempting to reach you since our last contact. I would like to continue to work with you and provide any additional support you may need on achieving your health care related goals. I would appreciate if you would give me a call at [phone] to let me know if you would like to continue working together. I know that there are many things that can affect our ability to communicate and I hope we can continue to work together.    All of us at the [facility] are invested in your health and are here to assist you in meeting your goals.     Sincerely,    Parisa BLACKMAN  Community Health Worker  [facility] Care Coordination  [email or web address]  [email or web address]  Office: [phone]
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
## 2022-12-09 NOTE — PHARMACY-ADMISSION MEDICATION HISTORY
Pharmacy Note - Admission Medication History    Pertinent Provider Information: information provided by the patient's      ______________________________________________________________________    Prior To Admission (PTA) med list completed and updated in EMR.       PTA Med List   Medication Sig Last Dose     acetaminophen (TYLENOL) 500 MG tablet Take 500 mg by mouth every 6 hours as needed Unknown     aspirin (ASA) 81 MG chewable tablet Take 1 tablet (81 mg) by mouth every morning 12/9/2022     calcium carbonate 1250 (500 Ca) MG CHEW Take 500 mg by mouth 3 times daily as needed Unknown     cholecalciferol 50 MCG (2000 UT) tablet Take 1 tablet (50 mcg) by mouth every morning 12/9/2022     donepezil (ARICEPT) 10 MG tablet Take 1 tablet (10 mg) by mouth At Bedtime 12/8/2022     escitalopram (LEXAPRO) 10 MG tablet Take 1 tablet (10 mg) by mouth every morning 12/9/2022     ferrous sulfate (FEROSUL) 325 (65 Fe) MG tablet Take 1 tablet (325 mg) by mouth daily (with breakfast) 12/9/2022     gabapentin (NEURONTIN) 600 MG tablet Take 1 tablet (600 mg) by mouth daily 12/8/2022 at pm     metoprolol tartrate (LOPRESSOR) 25 MG tablet Take 0.5 tablets (12.5 mg) by mouth 2 times daily 12/9/2022 at am     rosuvastatin (CRESTOR) 10 MG tablet Take 1 tablet (10 mg) by mouth every evening 12/8/2022       Information source(s): Family member and CareEverywhere/[facility]  Method of interview communication: in-person    Summary of Changes to PTA Med List  New: none  Discontinued: none  Changed: Tylenol, Tums    Patient was asked about OTC/herbal products specifically.  PTA med list reflects this.    In the past week, patient estimated taking medication this percent of the time:  greater than 90%.    Allergies were reviewed, assessed, and updated with the patient.      Patient does not use any multi-dose medications prior to admission.    The information provided in this note is only as accurate as the sources available at the  time of the update(s).    Thank you for the opportunity to participate in the care of this patient.    Joann Gibson RPH  12/9/2022 1:56 PM
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
## 2022-12-09 NOTE — ED TRIAGE NOTES
Patient presents here with blood stool and urine as noted by family members that has occurred over the past 48 hours. With last blood draw in clinic, her hgb was found to be low according to family members. Patient does have dementia and lives with family members.
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
## 2022-12-09 NOTE — CONSULTS
Care Management Initial Consult    General Information  Assessment completed with: Children, Spouse or significant other, Sofy-daughter  Type of CM/SW Visit: Initial Assessment    Primary Care Provider verified and updated as needed: Yes   Readmission within the last 30 days:           Advance Care Planning: Advance Care Planning Reviewed: present on chart          Communication Assessment  Patient's communication style: spoken language (English or Bilingual)             Cognitive  Cognitive/Neuro/Behavioral:                        Living Environment:   People in home: child(feliciano), adult, spouse  , daughter, son in law  Current living Arrangements: house      Able to return to prior arrangements: yes  Living Arrangement Comments: with family    Family/Social Support:  Care provided by: spouse/significant other, child(feliciano)  Provides care for: no one, unable/limited ability to care for self  Marital Status:   , Children          Description of Support System: Supportive    Support Assessment: Adequate family and caregiver support    Current Resources:   Patient receiving home care services: No     Community Resources: Home Care  Equipment currently used at home: walker, rolling  Supplies currently used at home: Incontinence Supplies    Employment/Financial:  Employment Status:          Financial Concerns: No concerns identified   Referral to Financial Worker: No       Lifestyle & Psychosocial Needs:  Social Determinants of Health     Tobacco Use: Low Risk      Smoking Tobacco Use: Never     Smokeless Tobacco Use: Never     Passive Exposure: Not on file   Alcohol Use: Not on file   Financial Resource Strain: Low Risk      Difficulty of Paying Living Expenses: Not hard at all   Food Insecurity: No Food Insecurity     Worried About Running Out of Food in the Last Year: Never true     Ran Out of Food in the Last Year: Never true   Transportation Needs: No Transportation Needs     Lack of Transportation  (Medical): No     Lack of Transportation (Non-Medical): No   Physical Activity: Not on file   Stress: Not on file   Social Connections: Not on file   Intimate Partner Violence: Not on file   Depression: Not at risk     PHQ-2 Score: 2   Housing Stability: Low Risk      Unable to Pay for Housing in the Last Year: No     Number of Places Lived in the Last Year: 2     Unstable Housing in the Last Year: No       Functional Status:  Prior to admission patient needed assistance:   Dependent ADLs:: Ambulation-walker, Bathing, Dressing, Incontinence, Toileting  Dependent IADLs:: Cleaning, Cooking, Laundry, Shopping, Meal Preparation, Medication Management, Money Management, Transportation, Incontinence       Mental Health Status:  Mental Health Status: No Current Concerns       Chemical Dependency Status:  Chemical Dependency Status: No Current Concerns             Values/Beliefs:  Spiritual, Cultural Beliefs, Advent Practices, Values that affect care: no               Additional Information:  Chart reviewed. Initial assessment completed with patient's daughter Sofy and patient's  Mahesh. Patient lives in a house with her , daughter, son in law.  is her primary caregiver and assists with ADLs as needed. Pt is typically ambulatory with a walker at baseline and can complete steps.   Daughter reports patient's care needs are becoming more challenging for  to complete as he has some vision deficits. Daughter reports her  works from home but is unable to be a full time caregiver and daughter also working. Pt does have meals on wheels delivered during the day. Per daughter, they currently have a caregiver/family friend helping 3x a week but it has been a challenge for patient and  to allow this caregiver to complete personal cares and bathing needs. SW provided daughter with a list of additional private duty home care agencies and also provided some education to patients  and  daughter on caregiver stress and burnout and allowing help in the home to care for patient. SW acknowledged this transition can be difficult but important in keeping both pt and  healthy.   SW will remain available as needed for discharge planning needs.     Lisa Mitchell, LGSW

## 2022-12-10 PROBLEM — D62 ANEMIA DUE TO BLOOD LOSS, ACUTE: Status: ACTIVE | Noted: 2022-01-01

## 2022-12-10 PROBLEM — K92.2 GI BLEED: Status: ACTIVE | Noted: 2022-01-01

## 2022-12-10 PROBLEM — F03.90 DEMENTIA (H): Status: ACTIVE | Noted: 2022-01-01

## 2022-12-10 NOTE — PROGRESS NOTES
[facility]    PROGRESS NOTE - Hospitalist Service    Assessment and Plan    Active Problems:    Generalized anxiety disorder    GI bleed    Anemia due to blood loss, acute    Dementia (H)     Kat Up is a 87 year old female with h/o  dementia, IBS, BPPV, HLD, chronic lower back pain, and generalized anxiety disorder who presents to this ED by walk-in for evaluation of rectal bleeding.    Lower GI bleed, presented with bright red bleeding per rectum.  -  CTA  wall thickening of the cecum and ascending colon. 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 Daughter agrees with stopping lab checks and most of her meds except for anxiety meds at this time. Reviewed wt could give blood if symptom control and the goal is comfort and no escalation of cares or aggressive measures.  - holding on consent for now and daughter in agreement with this and we could give blood if symptomatic but if continued bleeding would offer symptoms control with anxiety meds and SL morphine and she is in agreement with this.   - will conitnue IVF for now and Zosyn until hospice mets with her.   - stop telemetry, pulse Ox, labs, vitals (only if family requests),   - discussed with SW who will contact Hospice and discuss with daughter, likely qualify for inpatient hospice  - pleasure foods and changed to regular diet  - Comfort care orders placed    -will continue aricept for now and gabapentin      COVID-19 PCR Results    COVID-19 PCR Results   No data to display.         COVID-19 Antibody Results, Testing for Immunity    COVID-19 Antibody Results, Testing for Immunity   No data to display.            VTE prophylaxis:  none  DIET: Orders Placed This Encounter      Regular Diet  Adult    Drains/Lines: none  Weight bearing status: bed rest   Disposition/Barriers to discharge: pending hospice consult likely GIP   Code Status: No CPR- Do NOT Intubate    Subjective:  patient comfortable NAD, daughter present and reviewed comfort goal cares with her and confirmed this was the families requests.    PHYSICAL EXAM  Temp:  [98.1  F (36.7  C)-98.9  F (37.2  C)] 98.1  F (36.7  C)  Pulse:  [104-107] 107  Resp:  [16-21] 16  BP: ()/(52-87) 109/59  SpO2:  [95 %-98 %] 95 %  Wt Readings from Last 1 Encounters:   12/09/22 80.7 kg (178 lb)       Intake/Output Summary (Last 24 hours) at 12/10/2022 0824  Last data filed at 12/10/2022 0625  Gross per 24 hour   Intake 940 ml   Output 720 ml   Net 220 ml      Body mass index is 32.82 kg/m .    Physical Exam  Constitutional:       Comments: Sleeping but comfortable NAD, occasionally opens eyes    Cardiovascular:      Rate and Rhythm: Regular rhythm. Tachycardia present.   Pulmonary:      Comments: Poor inspiration, diminished   Musculoskeletal:         General: Normal range of motion.   Skin:     Capillary Refill: Capillary refill takes less than 2 seconds.      Coloration: Skin is pale.   Neurological:      General: No focal deficit present.       PERTINENT LABS/IMAGING:  Results for orders placed or performed during the hospital encounter of 12/09/22   CTA Abdomen Pelvis with Contrast    Impression    IMPRESSION:  1.  No active bleeding into the gastrointestinal system.   2.  Colonic diverticulosis without definite diverticulitis.  3.  There is wall thickening of the cecum and ascending colon.   4.  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 Recommend further evaluation, perhaps with neuroendocrine-specific PET/CT.       Recent Labs   Lab 12/10/22  0826 12/09/22  2349 12/09/22  1746 12/09/22  1006   WBC 4.7  --   --  7.8   HGB 7.5*  7.5* 7.5* 8.1* 8.2*    MCV 92  --   --  91     --   --  252   INR  --   --  1.09  --      --   --  140   POTASSIUM 4.0  --   --  4.3   CHLORIDE 111*  --   --  105   CO2 24  --   --  25   BUN 17.5  --   --  29.7*   CR 0.87  --   --  0.92   ANIONGAP 8  --   --  10   REBEKAH 8.2*  --   --  8.8   GLC 95  --   --  118*     Recent Labs   Lab Test 02/12/20  0452   CHOL 132   HDL 39*   LDL 56   TRIG 185*     Recent Labs   Lab Test 02/12/20  0452   LDL 56     Recent Labs   Lab Test 12/09/22  1006      POTASSIUM 4.3   CHLORIDE 105   CO2 25   *   BUN 29.7*   CR 0.92   GFRESTIMATED 60*   REBEKAH 8.8     No results for input(s): A1C in the last 25448 hours.  Recent Labs   Lab Test 12/09/22  2349 12/09/22  1746 12/09/22  1006   HGB 7.5* 8.1* 8.2*     Recent Labs   Lab Test 12/29/19  2238 12/29/19  1814 12/29/19  1223   TROPONINI <0.01 <0.01 0.01     No results for input(s): BNP, NTBNPI, NTBNP in the last 73079 hours.  Recent Labs   Lab Test 09/07/22  1600   TSH 1.76     Recent Labs   Lab Test 12/09/22  1746   INR 1.09       Vaishali De Leon MD  [facility] Medicine Service  [phone]

## 2022-12-10 NOTE — DISCHARGE SUMMARY
[facility]  Hospitalist Discharge Summary      Date of Admission:  12/9/2022  Date of Discharge:  12/10/2022  Discharging Provider: Vaishali De Leon MD    Discharge Diagnoses   Active Problems:    Generalized anxiety disorder    GI bleed    Anemia due to blood loss, acute    Dementia (H)      Follow-ups Needed After Discharge     Kat Up is being readmitted to inpatient hospice    Discharge Disposition   Discharged to inpatient hospice  Condition at discharge: Guarded    Hospital Course   Kat Up is a 87 year old female who was admitted on 12/9/2022.  She is being discharged from the current hospital encounter and will be readmitted to inpatient hospice.    Kat Up is a 87 year old female with h/o  dementia, IBS, BPPV, HLD, chronic lower back pain, and generalized anxiety disorder who presents to this ED by walk-in for evaluation of rectal bleeding.     Lower GI bleed, presented with bright red bleeding per rectum.  -  CTA  wall thickening of the cecum and ascending colon. 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 Daughter agrees with stopping lab checks and most of her meds except for anxiety meds at this time. Reviewed wt could give blood if symptom control and the goal is comfort and no escalation of cares or aggressive measures.  - holding on consent for now and daughter in agreement with this and we could give blood if symptomatic but if continued bleeding would offer symptoms control with anxiety meds and SL morphine and she is in agreement with this.   - will stop IVF and IV zosyn now   - stop telemetry, pulse Ox, labs, vitals (only if family requests),   - discussed with SW who will contact Hospice and discuss with daughter,  likely qualify for inpatient hospice  - pleasure foods and changed to regular diet  - Comfort care orders placed    -will continue aricept for now and gabapentin     Consultations This Hospital Stay   CARE MANAGEMENT / SOCIAL WORK IP CONSULT  PALLIATIVE CARE ADULT IP CONSULT  GIP INPATIENT HOSPICE ADULT CONSULT  GASTROENTEROLOGY IP CONSULT  SPIRITUAL HEALTH SERVICES IP CONSULT  GIP INPATIENT HOSPICE ADULT CONSULT  SPIRITUAL HEALTH SERVICES IP CONSULT  PHARMACY IP CONSULT    Code Status   No CPR- Do NOT Intubate    Time Spent on this Encounter   I, Vaishali De Leon MD, personally saw the patient today and spent greater than 30 minutes discharging this patient.       Vaishali De Leon MD  [address] [address]  Phone: [phone]  Fax: [phone]  ______________________________________________________________________  Discharge Medications   Current Discharge Medication List      CONTINUE these medications which have NOT CHANGED    Details   acetaminophen (TYLENOL) 500 MG tablet Take 500 mg by mouth every 6 hours as needed      aspirin (ASA) 81 MG chewable tablet Take 1 tablet (81 mg) by mouth every morning  Qty: 90 tablet, Refills: 3    Associated Diagnoses: Hyperlipidemia, unspecified hyperlipidemia type      calcium carbonate 1250 (500 Ca) MG CHEW Take 500 mg by mouth 3 times daily as needed      cholecalciferol 50 MCG (2000 UT) tablet Take 1 tablet (50 mcg) by mouth every morning  Qty: 90 tablet, Refills: 3    Associated Diagnoses: Vitamin D deficiency      donepezil (ARICEPT) 10 MG tablet Take 1 tablet (10 mg) by mouth At Bedtime  Qty: 90 tablet, Refills: 3    Associated Diagnoses: Alzheimer's disease (H)      escitalopram (LEXAPRO) 10 MG tablet Take 1 tablet (10 mg) by mouth every morning  Qty: 90 tablet, Refills: 3    Associated Diagnoses: Generalized anxiety disorder      ferrous sulfate (FEROSUL) 325 (65 Fe) MG tablet Take 1 tablet (325 mg)  by mouth daily (with breakfast)  Qty: 90 tablet, Refills: 3    Associated Diagnoses: Iron deficiency      gabapentin (NEURONTIN) 600 MG tablet Take 1 tablet (600 mg) by mouth daily  Qty: 90 tablet, Refills: 3    Associated Diagnoses: Tremor      metoprolol tartrate (LOPRESSOR) 25 MG tablet Take 0.5 tablets (12.5 mg) by mouth 2 times daily  Qty: 90 tablet, Refills: 3    Associated Diagnoses: Atypical atrial flutter (H)      rosuvastatin (CRESTOR) 10 MG tablet Take 1 tablet (10 mg) by mouth every evening  Qty: 90 tablet, Refills: 3    Associated Diagnoses: Hyperlipidemia, unspecified hyperlipidemia type      Blood Pressure Monitoring (BP MONITOR-STETHOSCOPE) KIT Check bp daily  Qty: 1 kit, Refills: 0    Associated Diagnoses: Asymptomatic menopausal state; Tachycardia             Physical Exam   Vital Signs: Temp: 98.2  F (36.8  C) Temp src: Oral BP: 100/58 Pulse: 111   Resp: 18 SpO2: 97 % O2 Device: None (Room air)    Weight: 178 lbs 0 oz    Please see physical exam from my PROGRESS NOTE on the same date       Primary Care Physician   Ayse Cornejo    Discharge Orders   No discharge procedures on file.    Significant Results and Procedures   Most Recent 3 CBC's:Recent Labs   Lab Test 12/10/22  0826 12/09/22  2349 12/09/22  1746 12/09/22  1006 09/07/22  1600   WBC 4.7  --   --  7.8 7.5   HGB 7.5*  7.5* 7.5* 8.1* 8.2* 8.5*   MCV 92  --   --  91 77*     --   --  252 316     Most Recent 3 BMP's:Recent Labs   Lab Test 12/10/22  0826 12/09/22  1006 09/07/22  1600    140 136   POTASSIUM 4.0 4.3 4.7   CHLORIDE 111* 105 100   CO2 24 25 24   BUN 17.5 29.7* 28.3*   CR 0.87 0.92 0.76   ANIONGAP 8 10 12   REBEKAH 8.2* 8.8 9.1   GLC 95 118* 100*     Most Recent 2 LFT's:Recent Labs   Lab Test 09/07/22  1600 03/09/21  1136   AST 26 22   ALT 14 16   ALKPHOS 58 78   BILITOTAL <0.2 0.3   ,   Results for orders placed or performed during the hospital encounter of 12/09/22   CTA Abdomen Pelvis with Contrast    Narrative     EXAM: CTA ABDOMEN PELVIS WITH CONTRAST  LOCATION: [facility]  DATE/TIME: 12/9/2022 11:35 AM    INDICATION: GI bleed, low abd pain  COMPARISON: 08/31/2007   TECHNIQUE: CT angiogram abdomen pelvis during arterial phase of injection of IV contrast. 2D and 3D MIP reconstructions were performed by the CT technologist. Dose reduction techniques were used.  CONTRAST: IsoVue 370 75mL    FINDINGS:  ANGIOGRAM ABDOMEN/PELVIS: No active bleeding into the gastrointestinal system.     LOWER CHEST: Mild scarring in the lung bases.     HEPATOBILIARY:  Mild bile duct dilatation may be from cholecystectomy.     PANCREAS: Normal.    SPLEEN: Normal.    ADRENAL GLANDS: Normal.    KIDNEYS/BLADDER: Normal.    BOWEL: Small hiatal hernia. There is wall thickening of the cecum and ascending colon. There is colonic diverticulosis. No acute diverticulitis identified.     LYMPH NODES: 14 mm x 11 mm partially calcified right lower quadrant mesenteric soft tissue focus with smaller surrounding nodules.    PELVIC ORGANS: Hysterectomy.     MUSCULOSKELETAL: Osteopenia.       Impression    IMPRESSION:  1.  No active bleeding into the gastrointestinal system.   2.  Colonic diverticulosis without definite diverticulitis.  3.  There is wall thickening of the cecum and ascending colon.   4.  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 Recommend further evaluation, perhaps with neuroendocrine-specific PET/CT.       Allergies   Allergies   Allergen Reactions     No Known Drug Allergies

## 2022-12-10 NOTE — PROGRESS NOTES
Care Management Follow Up    Length of Stay (days): 0    Expected Discharge Date: 12/12/2022     Concerns to be Addressed: all concerns addressed in this encounter, discharge planning     Patient plan of care discussed at interdisciplinary rounds: Yes    Anticipated Discharge Disposition: Home     Anticipated Discharge Services:    Anticipated Discharge DME:      Patient/family educated on Medicare website which has current facility and service quality ratings:    Education Provided on the Discharge Plan:    Patient/Family in Agreement with the Plan: yes    Referrals Placed by CM/SW:    Private pay costs discussed: Not applicable    Additional Information:  Hospice referral made to [facility], they will reach out to Sofy arriola.      JENNY Mcguire

## 2022-12-10 NOTE — PLAN OF CARE
Problem: Plan of Care - These are the overarching goals to be used throughout the patient stay.    Goal: Optimal Comfort and Wellbeing  12/9/2022 2121 by Yamilex White RN  Outcome: Progressing  12/9/2022 2119 by Yamilex White RN  Outcome: Progressing   Pt denies pain.    Problem: Gastrointestinal Bleeding  Goal: Hemostasis  Outcome: Progressing   Minimal bleeding noted.  Last hbg check 8.1, previous was 8.2.  Next check at 0000  Problem: Confusion Chronic  Goal: Optimal Cognitive Function  Outcome: Progressing  Pt is Oriented x1  Intervention: Minimize Injury Risk and Provide Safety  Recent Flowsheet Documentation  Taken 12/9/2022 2000 by Yamilex White RN  Enhanced Safety Measures: bed alarm set  Taken 12/9/2022 1700 by Yamilex White RN  Enhanced Safety Measures:   bed alarm set   family to remain at bedside   Goal Outcome Evaluation:       Alarms in place, frequent reorientation, family present during daytime hours.    Yamilex White RN

## 2022-12-10 NOTE — PLAN OF CARE
Problem: Plan of Care - These are the overarching goals to be used throughout the patient stay.    Goal: Plan of Care Review  Outcome: Progressing  Goal: Patient-Specific Goal (Individualized)  Outcome: Progressing  Goal: Absence of Hospital-Acquired Illness or Injury  Outcome: Progressing  Intervention: Identify and Manage Fall Risk  Recent Flowsheet Documentation  Taken 12/10/2022 0830 by Maria Elena Byrnes RN  Safety Promotion/Fall Prevention:   activity supervised   assistive device/personal items within reach   bed alarm on  Intervention: Prevent and Manage VTE (Venous Thromboembolism) Risk  Recent Flowsheet Documentation  Taken 12/10/2022 0830 by Maria Elena Byrnes RN  VTE Prevention/Management: SCDs (sequential compression devices) on  Goal: Optimal Comfort and Wellbeing  Outcome: Progressing  Goal: Readiness for Transition of Care  Outcome: Progressing     Problem: Gastrointestinal Bleeding  Goal: Optimal Coping with Acute Illness  Outcome: Progressing  Goal: Hemostasis  Outcome: Progressing     Problem: Hypertension Comorbidity  Goal: Blood Pressure in Desired Range  Outcome: Progressing  Intervention: Maintain Blood Pressure Management  Recent Flowsheet Documentation  Taken 12/10/2022 0830 by Maria Elena Byrnes RN  Medication Review/Management: medications reviewed     Problem: Confusion Chronic  Goal: Optimal Cognitive Function  Outcome: Progressing  Intervention: Minimize Injury Risk and Provide Safety  Recent Flowsheet Documentation  Taken 12/10/2022 0830 by Maria Elena Byrnes RN  Enhanced Safety Measures: bed alarm set   Goal Outcome Evaluation:

## 2022-12-10 NOTE — PROGRESS NOTES
Pt alert and oriented to self only. Pleasant but confused. Hgb this AM 7.5. Pt family here and confirmed with MD that pt will be comfort cares. Hospice consult pending. Incontinent of large amount of bloody stool. Denies pain or nausea.  and daughter at bedside, will continue to monitor.

## 2022-12-10 NOTE — PLAN OF CARE
"Goal Outcome Evaluation:                      PRIMARY DIAGNOSIS: \"GENERIC\" NURSING  OUTPATIENT/OBSERVATION GOALS TO BE MET BEFORE DISCHARGE:  1. ADLs back to baseline: Yes    2. Activity and level of assistance: standby with walker    3. Pain status: Pain free.    4. Return to near baseline physical activity: Yes     Discharge Planner Nurse   Safe discharge environment identified: Yes  Barriers to discharge: Yes  stabilize hgb       Entered by: Janes Valera RN 12/10/2022 5:34 AM     Please review provider order for any additional goals.   Nurse to notify provider when observation goals have been met and patient is ready for discharge.  "

## 2022-12-10 NOTE — PLAN OF CARE
"PRIMARY DIAGNOSIS: \"GENERIC\" NURSING  OUTPATIENT/OBSERVATION GOALS TO BE MET BEFORE DISCHARGE:  ADLs back to baseline: No    Activity and level of assistance: 1 assist with walker    Pain status: Pain free.    Return to near baseline physical activity: No     Discharge Planner Nurse   Safe discharge environment identified: Yes  Barriers to discharge: Yes       Entered by: Janes Valera RN 12/10/2022 12:39 AM     Please review provider order for any additional goals.   Nurse to notify provider when observation goals have been met and patient is ready for discharge.Goal Outcome Evaluation:                        "

## 2022-12-11 NOTE — PLAN OF CARE
Goal Outcome Evaluation:      Plan of Care Reviewed With: patient, spouse, child          Outcome Evaluation: pt transitioned to in-patient hospice care this afternoon. pt smiling, interacts easily & pleasant with interactions.  here & supportive

## 2022-12-11 NOTE — PROGRESS NOTES
Pt up in chair eating breakfast. Pleasant & cooperative with meds but did pull her saline lock out previously

## 2022-12-11 NOTE — PROGRESS NOTES
Care Management Follow Up    Length of Stay (days): 0    Expected Discharge Date: 12/13/2022     Concerns to be Addressed:       Patient plan of care discussed at interdisciplinary rounds: Yes    Anticipated Discharge Disposition:       Anticipated Discharge Services:    Anticipated Discharge DME:      Patient/family educated on Medicare website which has current facility and service quality ratings:    Education Provided on the Discharge Plan:    Patient/Family in Agreement with the Plan:      Referrals Placed by CM/SW:    Private pay costs discussed: Not applicable    Additional Information:  Pt admitted to [facility].  Pt lives in a house with her  and daughter's family.   primary caregiver but is becoming more challenging to care for pt at home.  Daughter Sofy is main contact.      JENNY Mcguire

## 2022-12-11 NOTE — PROGRESS NOTES
[facility]    Progress Note - [facility]    ______________________________________________________________________    [facility] 24/7 Contact Number: [phone]    - Providers: Please contact [facility] with changes in orders or clinical plan of care   - Nursing: Please contact [facility] with significant changes in patient condition  ______________________________________________________________________        Writer reviewing chart this morning; appears patient may not be eligible for GIP inpatient hospice; escalated to team for further review.

## 2022-12-11 NOTE — PHARMACY-ADMISSION MEDICATION HISTORY
Admission medication history completed at [facility]. Please see Pharmacy - Admission Medication History note from 12/09/2022.

## 2022-12-11 NOTE — SIGNIFICANT EVENT
Pt was previously up in chair over her breakfast meal, hourly rounding before 1000 noted her calm, relaxed and sitting on recliner with legs elevated. Pt's family arrived around 1030 to find her sitting on floor by bed & chair on her buttocks, still calm & smiling. Pt assisted back to bed by staff. House officer paged and did see pt to assess, family present for hospice meeting and Dr Ankush Villasenor & ANS notified.

## 2022-12-11 NOTE — SIGNIFICANT EVENT
Significant Event Note    Time of event: 10:59 AM December 11, 2022    Description of event:  Notified by RN of fall. Patient's family walked into the room and found patient sitting up on the floor; she previously had been sitting in her chair. Patient does not remember her fall. She denies pain of her head, back, and extremities.     Trauma exam negative; no pain of lower or upper extremities, no pain with palpation of C-spine and head appears atraumatic. CN II-XII intact. Patient is demented and only oriented to self, granddaughters in room state this is baseline.    Plan:  Patient appears to have experienced a mechanical fall. No concern for injury or trauma. Patient not on anticoagulation. No additional work up indicated.     Deb Lebron MD  [facility], House Officer

## 2022-12-11 NOTE — PROGRESS NOTES
[facility]    Progress Note - [facility]    ______________________________________________________________________    [facility] Contact Number: [phone]    - Providers: Please contact [facility] with changes in orders or clinical plan of care   - Nursing: Please contact [facility] with significant changes in patient condition  ______________________________________________________________________      Plan of Care Discussed with the Following:   - Nurse: ELISHA Estevez  - Hospitalist/Rounding Provider: Vaishali De Leon MD  - Kat's Family/Preferred Contact: Sofy Doss  - Hospice Provider(s): Dr. Mikhail Camacho, Dr. Drake Angel.    Hospice dx: Neoplasm, anemia      Summary of Visit (includes assessment, medications and any new orders):   Patient's family elected the hospice benefit and consents were signed on 12/11/22.    This writer spoke with Dr. Ivone Angel Medical Director regarding patient's status and plan of care.    Will need clear discharge plan if patient's condition stabilizes.      Doris Rivera, RN

## 2022-12-11 NOTE — H&P
[facility]    History and Physical - Hospitalist Service       Date of Admission:  12/11/2022    Assessment & Plan   Kat Up is a 87 year old female  with h/o  dementia, IBS, BPPV, HLD, chronic lower back pain, and generalized anxiety disorder who is being transitioned to inpatient hospice on 12/11/2022. Please see the discharge summary  for more details of her hospital course.    Lower GI bleed, presented with bright red bleeding per rectum.  -  CTA  wall thickening of the cecum and ascending colon. 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 Daughter agrees with stopping lab checks and most of her meds except for anxiety meds at this time.  - will stop IVF and IV zosyn  - stop telemetry, pulse Ox, labs, vitals (only if family requests),   - pleasure foods and changed to regular diet  - Comfort care orders placed    -will continue aricept, Lexapro and gabapentin for now       Diet: Regular Diet Adult    Dash Catheter: Not present  Central Lines: None  Code Status: No CPR- Do NOT Intubate    Expected Discharge: working on discharge with hospice    Vaishali eD Leon MD  Hospitalist Service  [facility]  Securely message with the Vocera Web Console (learn more here)  Text page via Websense Paging/Directory         ______________________________________________________________________    Chief Complaint   Transitioning to inpatient hospice    History of Present Illness   Kat Up is a 87 year old female who is being transitioned to inpatient hospice.  Please see the discharge summary from the same date for more details; a brief summary of her current symptoms is included here.        Review of Systems    Review of systems was not needed on this  patient    Past Medical History    Please see the medical & surgical history outlined in the H&P from the previous hospital encounter    Social History   Please see the social history outlined in the H&P from the previous hospital encounter    Family History   Please see the family history outlined in the H&P from the previous hospital encounter    Prior to Admission Medications   Prior to Admission Medications   Prescriptions Last Dose Informant Patient Reported? Taking?   Blood Pressure Monitoring (BP MONITOR-STETHOSCOPE) KIT   No No   Sig: Check bp daily   Patient not taking: Reported on 9/12/2022   acetaminophen (TYLENOL) 500 MG tablet   Yes No   Sig: Take 500 mg by mouth every 6 hours as needed   aspirin (ASA) 81 MG chewable tablet   No No   Sig: Take 1 tablet (81 mg) by mouth every morning   calcium carbonate 1250 (500 Ca) MG CHEW   Yes No   Sig: Take 500 mg by mouth 3 times daily as needed   cholecalciferol 50 MCG (2000 UT) tablet   No No   Sig: Take 1 tablet (50 mcg) by mouth every morning   donepezil (ARICEPT) 10 MG tablet   No No   Sig: Take 1 tablet (10 mg) by mouth At Bedtime   escitalopram (LEXAPRO) 10 MG tablet   No No   Sig: Take 1 tablet (10 mg) by mouth every morning   ferrous sulfate (FEROSUL) 325 (65 Fe) MG tablet   No No   Sig: Take 1 tablet (325 mg) by mouth daily (with breakfast)   gabapentin (NEURONTIN) 600 MG tablet   No No   Sig: Take 1 tablet (600 mg) by mouth daily   metoprolol tartrate (LOPRESSOR) 25 MG tablet   No No   Sig: Take 0.5 tablets (12.5 mg) by mouth 2 times daily   rosuvastatin (CRESTOR) 10 MG tablet   No No   Sig: Take 1 tablet (10 mg) by mouth every evening      Facility-Administered Medications: None     Allergies   Allergies   Allergen Reactions     No Known Drug Allergies        Physical Exam   Vital Signs:                    Weight: 0 lbs 0 oz

## 2022-12-11 NOTE — PLAN OF CARE
Problem: Plan of Care - These are the overarching goals to be used throughout the patient stay.    Goal: Optimal Comfort and Wellbeing  Outcome: Progressing   Goal Outcome Evaluation:       Patient is alert to self. No sign or symptom of pain noted. Incontinent of bowel and bladder, purewick in place. NS infusing at 75 ml/hr. Call light within reach, routine check and bed alarm on. Home with hospice at discharge.

## 2022-12-11 NOTE — PLAN OF CARE
"PRIMARY DIAGNOSIS: \"GENERIC\" NURSING  OUTPATIENT/OBSERVATION GOALS TO BE MET BEFORE DISCHARGE:  ADLs back to baseline: No    Activity and level of assistance: 1 assist    Pain status: Pain free.    Return to near baseline physical activity: No     Discharge Planner Nurse   Safe discharge environment identified: Yes  Barriers to discharge: Yes       Entered by: Janes Valera RN 12/11/2022 12:49 AM     Please review provider order for any additional goals.   Nurse to notify provider when observation goals have been met and patient is ready for discharge.Goal Outcome Evaluation:                        "

## 2022-12-11 NOTE — PLAN OF CARE
Problem: Confusion Chronic  Goal: Optimal Cognitive Function  Intervention: Minimize Injury Risk and Provide Safety  Recent Flowsheet Documentation  Taken 12/10/2022 1600 by KYM FREY  Enhanced Safety Measures:    bed alarm set    family to remain at bedside     Problem: Plan of Care - These are the overarching goals to be used throughout the patient stay.    Goal: Optimal Comfort and Wellbeing  Outcome: Progressing   Goal Outcome Evaluation    Pt arrived with anxiety and rectal bleeding; plan is to go home with hospice care; alert to self only; pt reports no pain; pt has external catheter; dementia and colitis hx; pt likes to sing [place] songs; incontinent of bowels and urine; Daughter is POA, according to shift report; pt is reported to have bloody stools, but we did not see any when changing brief during this shift

## 2022-12-11 NOTE — DISCHARGE SUMMARY
[facility]  Hospitalist Discharge Summary      Date of Admission:  12/9/2022  Date of Discharge:  12/10/2022  Discharging Provider: Vaishali De Leon MD    Discharge Diagnoses   Active Problems:    Generalized anxiety disorder    GI bleed    Anemia due to blood loss, acute    Dementia (H)      Follow-ups Needed After Discharge     Kat Up is being readmitted to inpatient hospice    Discharge Disposition   Discharged to inpatient hospice  Condition at discharge: Guarded    Hospital Course   Kat Up is a 87 year old female who was admitted on 12/9/2022.    History of  dementia, IBS, BPPV, HLD, chronic lower back pain, and generalized anxiety disorder who presents to this ED by walk-in for evaluation of rectal bleeding.She is being discharged from the current hospital encounter and will be readmitted to inpatient hospice.     Lower GI bleed, presented with bright red bleeding per rectum.  -  CTA  wall thickening of the cecum and ascending colon. 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 Daughter agrees with stopping lab checks and most of her meds except for anxiety meds at this time.   - comfort care and  symptoms control with anxiety meds and SL morphine and she is in agreement with this.   - will stop IVF and IV zosyn now   - stop telemetry, pulse Ox, labs, vitals (only if family requests),   - pleasure foods and changed to regular diet  - Comfort care orders placed    -will continue aricept, Lexapro and gabapentin   - patient was not able to discharge on 12/10 for GIP and was arranged today.     Consultations This Hospital Stay   CARE MANAGEMENT / SOCIAL WORK IP CONSULT  PALLIATIVE CARE ADULT IP CONSULT  [facility] INPATIENT HOSPICE ADULT  CONSULT  GASTROENTEROLOGY IP CONSULT  SPIRITUAL HEALTH SERVICES IP CONSULT  [facility] INPATIENT HOSPICE ADULT CONSULT  SPIRITUAL HEALTH SERVICES IP CONSULT  PHARMACY IP CONSULT    Code Status   No CPR- Do NOT Intubate    Time Spent on this Encounter   I, Vaishali De Leon MD, personally saw the patient today and spent greater than 30 minutes discharging this patient.       Vaishali De Leon MD  [address] [address]  Phone: [phone]  Fax: [phone]  ______________________________________________________________________  Discharge Medications   Discharge Medication List as of 12/11/2022  1:14 PM      CONTINUE these medications which have NOT CHANGED    Details   acetaminophen (TYLENOL) 500 MG tablet Take 500 mg by mouth every 6 hours as needed, Historical      aspirin (ASA) 81 MG chewable tablet Take 1 tablet (81 mg) by mouth every morning, Disp-90 tablet, R-3, E-Prescribe      calcium carbonate 1250 (500 Ca) MG CHEW Take 500 mg by mouth 3 times daily as needed, Historical      cholecalciferol 50 MCG (2000 UT) tablet Take 1 tablet (50 mcg) by mouth every morning, Disp-90 tablet, R-3, E-Prescribe      donepezil (ARICEPT) 10 MG tablet Take 1 tablet (10 mg) by mouth At Bedtime, Disp-90 tablet, R-3, E-Prescribe      escitalopram (LEXAPRO) 10 MG tablet Take 1 tablet (10 mg) by mouth every morning, Disp-90 tablet, R-3, E-Prescribe      ferrous sulfate (FEROSUL) 325 (65 Fe) MG tablet Take 1 tablet (325 mg) by mouth daily (with breakfast), Disp-90 tablet, R-3, E-Prescribe      gabapentin (NEURONTIN) 600 MG tablet Take 1 tablet (600 mg) by mouth daily, Disp-90 tablet, R-3, E-Prescribe      metoprolol tartrate (LOPRESSOR) 25 MG tablet Take 0.5 tablets (12.5 mg) by mouth 2 times daily, Disp-90 tablet, R-3, E-Prescribe      rosuvastatin (CRESTOR) 10 MG tablet Take 1 tablet (10 mg) by mouth every evening, Disp-90 tablet, R-3, E-Prescribe      Blood Pressure  Monitoring (BP MONITOR-STETHOSCOPE) KIT Check bp daily, Disp-1 kit, R-0, Local Print             Physical Exam   Vital Signs: Temp: 97.8  F (36.6  C) Temp src: Oral BP: 121/74 Pulse: 108   Resp: 18 SpO2: 98 % O2 Device: None (Room air)    Weight: 178 lbs 0 oz         Primary Care Physician   Ayse Cornejo    Discharge Orders   No discharge procedures on file.    Significant Results and Procedures   Most Recent 3 CBC's:  Recent Labs   Lab Test 12/10/22  0826 12/09/22  2349 12/09/22  1746 12/09/22  1006 09/07/22  1600   WBC 4.7  --   --  7.8 7.5   HGB 7.5*  7.5* 7.5* 8.1* 8.2* 8.5*   MCV 92  --   --  91 77*     --   --  252 316     Most Recent 3 BMP's:  Recent Labs   Lab Test 12/10/22  0826 12/09/22  1006 09/07/22  1600    140 136   POTASSIUM 4.0 4.3 4.7   CHLORIDE 111* 105 100   CO2 24 25 24   BUN 17.5 29.7* 28.3*   CR 0.87 0.92 0.76   ANIONGAP 8 10 12   REBEKAH 8.2* 8.8 9.1   GLC 95 118* 100*     Most Recent 2 LFT's:  Recent Labs   Lab Test 09/07/22  1600 03/09/21  1136   AST 26 22   ALT 14 16   ALKPHOS 58 78   BILITOTAL <0.2 0.3   ,   Results for orders placed or performed during the hospital encounter of 12/09/22   CTA Abdomen Pelvis with Contrast    Narrative    EXAM: CTA ABDOMEN PELVIS WITH CONTRAST  LOCATION: [facility]  DATE/TIME: 12/9/2022 11:35 AM    INDICATION: GI bleed, low abd pain  COMPARISON: 08/31/2007   TECHNIQUE: CT angiogram abdomen pelvis during arterial phase of injection of IV contrast. 2D and 3D MIP reconstructions were performed by the CT technologist. Dose reduction techniques were used.  CONTRAST: IsoVue 370 75mL    FINDINGS:  ANGIOGRAM ABDOMEN/PELVIS: No active bleeding into the gastrointestinal system.     LOWER CHEST: Mild scarring in the lung bases.     HEPATOBILIARY:  Mild bile duct dilatation may be from cholecystectomy.     PANCREAS: Normal.    SPLEEN: Normal.    ADRENAL GLANDS: Normal.    KIDNEYS/BLADDER: Normal.    BOWEL: Small hiatal hernia.  There is wall thickening of the cecum and ascending colon. There is colonic diverticulosis. No acute diverticulitis identified.     LYMPH NODES: 14 mm x 11 mm partially calcified right lower quadrant mesenteric soft tissue focus with smaller surrounding nodules.    PELVIC ORGANS: Hysterectomy.     MUSCULOSKELETAL: Osteopenia.       Impression    IMPRESSION:  1.  No active bleeding into the gastrointestinal system.   2.  Colonic diverticulosis without definite diverticulitis.  3.  There is wall thickening of the cecum and ascending colon.   4.  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 Recommend further evaluation, perhaps with neuroendocrine-specific PET/CT.       Allergies   Allergies   Allergen Reactions     No Known Drug Allergies

## 2022-12-12 NOTE — PROGRESS NOTES
Contact   Chart Review     Situation: Patient chart reviewed by .    Background:enrolled in care coordination.     Assessment: Patient is hospitalized and has been accepted into hospice.     Plan/Recommendations: Will follow until discharge from hospital to ensure safe discharge plan.   Brenda Ohara,   [facility]  [phone]

## 2022-12-12 NOTE — PLAN OF CARE
Goal Outcome Evaluation:       Patient comfortable, had a restful night. No symptom or sign of pain noted. Routine checks and call light within reach.

## 2022-12-12 NOTE — PLAN OF CARE
Problem: End-of-Life Care  Goal: Comfort, Peace and Preserved Dignity  Outcome: Progressing  Intervention: Promote Physical Comfort  Recent Flowsheet Documentation  Taken 12/12/2022 0842 by Yulissa Barillas RN  Airway/Ventilation Support: comfort measures provided  Sensory Stimulation Regulation:   auditory stimulation provided   television on   visual stimulation provided  Environmental Support: calm environment promoted  Intervention: Promote Peace and Maintain Dignity  Recent Flowsheet Documentation  Taken 12/12/2022 0842 by Yulissa Barillas RN  Supportive Measures: active listening utilized   Goal Outcome Evaluation:       Patient is pleasant and peaceful. Family visited earlier and  has been at bedside for second half of shift. Patient is oriented to self only. Patient has a good appetite, eating almost 100% of meals. Patient completely denies pain. Patient enjoys the lavender essential oil, and likes staff to introduce themselves and explain interventions. Turn and reposition Q2h, purewick in place. Patient is incontinent of bowel and bladder. First stool was black, second had blood.

## 2022-12-12 NOTE — PROGRESS NOTES
SPIRITUAL HEALTH SERVICES NOTE  [facility]; P1    SPIRITUAL ASSESSMENT    Difficult prognosis    No invasive tests/interventions desired    Pleasant, at ease; in no apparent distress     Former     Appreciates spiritual support/conversations    Yazidism     CARE PROVIDED  Provided calm, empathic listening and presence  Prayer shared    Brought her a Bible    SPIRITUAL CARE NOTE  Saw Kat due to Spiritual Care Consult. She was eating her lunch and invited me to sit down. Kat's  Mahesh shared that Kat has memory loss and often doesn't remember recent events. He shares that she has a GI bleed of an unknown source and that they do not want any invasive procedures or tests.  He shares that she is a former  in [place] and that she earned her PhD in [employer] when there weren't a lot of women getting them. Kat does not appear to be in any distress. She is able to joke with me and it is clear that she is a gracious woman with a good sense of humor. Mahesh welcomes a Bible for Kat. She is Yazidism and is aware that we will have a hospital  available tomorrow. She welcomes my offer of   lavender oil aromatherapy and prayer.     Time Spent with Patient/Family: 15 minutes    Plan of Care: Available for further follow-up as requested by patient, family or staff.      Adelina Caballero M.Div.      Office: [phone] (for non-urgent requests)  Please Vocneftali or page through [facility] for time-sensitive requests

## 2022-12-12 NOTE — PROGRESS NOTES
[facility]    Hospitalist Progress Note    Assessment & Plan   87 year old female who was admitted on 12/11/2022 to acute inpatient hospice for lower GI bleed and Moderately advanced dementia, with no desired GI workup.     Impression:   Principal Problem:    Lower GI bleeding   -- last bleeding episode yesterday    Active Problems:    Generalized anxiety disorder   -- will schedule Ativan 0.5 mg q4hr, and trazodone 50 mg at bedtime      Anemia due to blood loss, acute   -- no longer doing labs, last hgb 7.5 on 12/10/22      Moderate to Advanced Dementia   -- will stop Aricept      Plan:  Discussed with  -- he prefers she stays here, instead of doing hospice at home.  He is expecting her to die in the next few days.  Discussed with daughter, Sofy -- she is wondering if relatives should visit -- explained she could die at any moment, but currently stable.  And possibly not die in next several days (if stops bleeding), and then could look at home hospice.  Will update daily.     DVT Prophylaxis: none  Code Status: No CPR- Do NOT Intubate    Disposition: Expected discharge in 2-4 days, or may have to look at discharge to home with hospice or a facility.     Tevin Whitehead MD  Pager [phone]  Cell Phone [phone]  Text Page (7am to 6pm)    Interval History   Pleasant, no complaints,  at bedside holding her hand.  She does not know why she is here.   reports last passed blood yesterday, none since.     Physical Exam                      There were no vitals filed for this visit.  Vital Signs with Ranges  Pulse:  [108] 108  Resp:  [18] 18  BP: (121)/(74) 121/74  I/O last 3 completed shifts:  In: 120 [P.O.:120]  Out: 1050 [Urine:1050]    # Pain Assessment:  Current Pain Score 12/12/2022   Patient currently in pain? denies   Pain score (0-10) -   Pain descriptors -   Kat tracey pain level was assessed and she currently denies pain.        Constitutional: Awake,  "alert, cooperative, no apparent distress  Respiratory: Clear to auscultation bilaterally, no crackles or wheezing  Cardiovascular: Regular rate and rhythm, normal S1 and S2, and no murmur noted  GI: Normal bowel sounds, soft, non-distended, non-tender  Extrem: No calf tenderness, no ankle edema  Neuro: Ox1 (does not know the date or year, or where she is at, or why she is here, but knows her husbands name and thinks she has \"a couple of kids\", and  agrees with that), no focal motor or sensory deficits    Medications       gabapentin  600 mg Oral QPM     LORazepam  0.5 mg Sublingual Q4H     traZODone  50 mg Oral At Bedtime       Data   Recent Labs   Lab 12/10/22  0826 12/09/22  2349 12/09/22  1746 12/09/22  1006   WBC 4.7  --   --  7.8   HGB 7.5*  7.5* 7.5* 8.1* 8.2*   MCV 92  --   --  91     --   --  252   INR  --   --  1.09  --      --   --  140   POTASSIUM 4.0  --   --  4.3   CHLORIDE 111*  --   --  105   CO2 24  --   --  25   BUN 17.5  --   --  29.7*   CR 0.87  --   --  0.92   ANIONGAP 8  --   --  10   REBEKAH 8.2*  --   --  8.8   GLC 95  --   --  118*       Imaging:   No results found for this or any previous visit (from the past 24 hour(s)).   "

## 2022-12-13 NOTE — CONSULTS
[facility]    Progress Note - [facility]    ______________________________________________________________________    [facility] Contact Number: [phone]    - Providers: Please contact [facility] with changes in orders or clinical plan of care   - Nursing: Please contact [facility] with significant changes in patient condition  ______________________________________________________________________            Summary of Visit (includes assessment, medications and any new orders):   Met w/ patient and daughter at the bedside. Patient appears comfortable, reports no current pain/SOB/N/V. Appetite has been adequate. No visible s/x of discomfort noted at this time. Continue to monitor for worsening status r/t GI bleed. Patient may discharge home on hospice services if stabilizes. Family requesting lorazepam be given earlier, around the time pt spouse leaves for the day d/t increased anxiety.     Hospice MD gave the following recommendations:  -Lorazepam oral concentrate 0.5mg q2h PRN for anxiety/agitation       Inga Carbajal, RN  [phone]

## 2022-12-13 NOTE — PLAN OF CARE
Goal Outcome Evaluation:        Pt comfortable, slept most of the shift. No symptom or sign of pain noted. Purewick in place. Routine checks and call light within reach.

## 2022-12-13 NOTE — PROGRESS NOTES
[facility]    Hospitalist Progress Note    Assessment & Plan   87 year old female who was admitted on 12/11/2022 to acute inpatient hospice for lower GI bleed and Moderately advanced dementia, with no desired GI workup.     Impression:   Principal Problem:    Lower GI bleeding   -- last bleeding episode Sunday, none since then.     Active Problems:    Generalized anxiety disorder   -- Ativan 0.5 mg q4hr, and trazodone 50 mg at bedtime      Anemia due to blood loss, acute   -- no longer doing labs, last hgb 7.5 on 12/10/22      Moderate to Advanced Dementia   -- will stop Aricept      Plan:  Discussed with  -- he prefers she stays here, instead of doing hospice at home.  Discussed with daughter, Sofy, if still stable on Thurs then will need alternative options (home with hospice or private pay TCU)    DVT Prophylaxis: none  Code Status: No CPR- Do NOT Intubate    Disposition: Expected discharge at some point -- currently not actively dying.     Tevin Whitehead MD  Pager [phone]  Cell Phone [phone]  Text Page (7am to 6pm)    Interval History   No new complaints, sleeping more.      Physical Exam                      There were no vitals filed for this visit.  Vital Signs with Ranges     I/O last 3 completed shifts:  In: 630 [P.O.:630]  Out: 100 [Urine:100]    # Pain Assessment:  Current Pain Score 12/12/2022   Patient currently in pain? no   Pain score (0-10) -   Pain descriptors -   Kat tracey pain level was assessed and she currently denies pain.        Constitutional: Awake, alert, cooperative, no apparent distress  Respiratory: Clear to auscultation bilaterally, no crackles or wheezing  Cardiovascular: Regular rate and rhythm, normal S1 and S2, and no murmur noted  GI: Normal bowel sounds, soft, non-distended, non-tender  Extrem: No calf tenderness, no ankle edema  Neuro: Ox1, pleasant   Medications       gabapentin  600 mg Oral QPM     LORazepam  0.5 mg Sublingual  Q4H     traZODone  50 mg Oral At Bedtime       Data   Recent Labs   Lab 12/10/22  0826 12/09/22  2349 12/09/22  1746 12/09/22  1006   WBC 4.7  --   --  7.8   HGB 7.5*  7.5* 7.5* 8.1* 8.2*   MCV 92  --   --  91     --   --  252   INR  --   --  1.09  --      --   --  140   POTASSIUM 4.0  --   --  4.3   CHLORIDE 111*  --   --  105   CO2 24  --   --  25   BUN 17.5  --   --  29.7*   CR 0.87  --   --  0.92   ANIONGAP 8  --   --  10   REBEKAH 8.2*  --   --  8.8   GLC 95  --   --  118*       Imaging:   No results found for this or any previous visit (from the past 24 hour(s)).

## 2022-12-14 NOTE — PLAN OF CARE
Goal Outcome Evaluation:      Plan of Care Reviewed With: patient, spouse, child    Overall Patient Progress: no changeOverall Patient Progress: no change         Problem: End-of-Life Care  Goal: Comfort, Peace and Preserved Dignity  Outcome: Adequate for Care Transition     Problem: Plan of Care - These are the overarching goals to be used throughout the patient stay.    Goal: Readiness for Transition of Care  Outcome: Adequate for Care Transition     Dr Matamoros along with RN spent a great deal of time with daughter, Esther, and  discussing plan of care. Family exploring options of hospice at home vs hospice home. No pain.

## 2022-12-14 NOTE — PROVIDER NOTIFICATION
"Writer spoke with Yosef from hospice regarding patients medications. Dr Matamoros also paged as family here and would like to talk with him. Yosef from hospice is here all day and is also willing to come talk to the other family that is here.     Yosef discussed earlier today with daughter Anahi, that is the POA, that it may help the patient to change from Lorazepam to a longer acting benzodiazepine. At this time the family is not on the same page re: meds and it is very uncomfortable for the nursing staff as the daughters want her to receive the Ativan every 4 hours even if she is sedated or not anxious and the  doesn't want her to get it except at noc because he wants her to be able to sit up in the chair and converse with him.     Daughter that just got here from [place] showed nurse a message on her cell phone while her dad was also in the room that read \"My Dad doesn't want her to get anything for pain or anxiety but my sister is the POA and she wants her to have it.\"       "

## 2022-12-14 NOTE — PROGRESS NOTES
"Visit to Kat in her hospital room. She is supine in bed, neatly covered in thin blankets and a sheet.  Language is clear and distinct with word finding difficulty on several occasions as Kat attempts to describe the ages of her children. She verbalizes that her  has just recently left the room.  Kat denies physical pain. She cannot find the words independently, and accepts the cue \"life is not as you want it to go right now.\" to express her mild emotional distress.  Reviewed Dr. Santiago's 12/13/22 note along with CT result of 12/9/22.  R 18 at rest. No dyspnea with speech. Skin temps are normal. Kat offers her hand from under her covers, and remarks regarding writer's firm hand shake. Her  perhaps 3/5. NSS of acute distress.  Plan is to return Kat to routine status with conversion to longer acting benzodiazepine such as Xanax to reduce administration times as placement process is underway.    Yosef Chambers  [facility] Clinical Liaison - [facility]    Office:   [phone]    Cell: [phone]    [facility]: [phone]      "

## 2022-12-14 NOTE — PLAN OF CARE
Goal Outcome Evaluation:    Slept well. Disoriented to all but self and at times place. Pleasant. Scheduled ativan given Q 4 hrs. On hospice/comfort cares.

## 2022-12-14 NOTE — PROGRESS NOTES
"SPIRITUAL HEALTH SERVICES NOTE  [facility]; P1    SPIRITUAL CARE NOTE  Follow-up visit with Kat. She was sitting up in her chair and had just received her lunch. She was accompanied by her  Mahesh and her daughter Esther (?). Kat says that she has had better days, but is not able to verbalize what feels off today. She says that she would be more comfortable at home, but is quick to add that she appreciates the care from staff here. When asked about discharge, Mahesh says \"We are still waiting to hear from the doctor.\" He says that, given options, they would like to bring Kat home on hospice. Kat continues to be pleasant and does not appear to be in any distress.     Time Spent with Patient/Family: 15 minutes    Plan of Care: Will follow-up again with Kat/family later this week.      Adelina Caballero M.Div.      Office: [phone] (for non-urgent requests)  Please Vocera or page through [facility] for time-sensitive requests      "

## 2022-12-14 NOTE — PLAN OF CARE
Problem: Plan of Care - These are the overarching goals to be used throughout the patient stay.    Goal: Optimal Comfort and Wellbeing  Intervention: Provide Person-Centered Care  Recent Flowsheet Documentation  Taken 12/13/2022 1600 by Lauren Preston RN  Trust Relationship/Rapport:   care explained   choices provided   emotional support provided   empathic listening provided   questions answered   thoughts/feelings acknowledged   Goal Outcome Evaluation:      Plan of Care Reviewed With: patient    Patient ambulated to bathroom 2x per shift with assist of one and gait belt, still uses purewick for dribbles. Patient did not want  1630 ativan but took the 2030 scheduled dose. Denies pain, oriented only to self. Asked who I was and where I work several times. Pleasant and cooperative.

## 2022-12-14 NOTE — PROGRESS NOTES
Daily Progress Note        CODE STATUS:  No CPR- Do NOT Intubate    12/14/22  Assessment/Plan:  87 year old female who was admitted on 12/11/2022 to acute inpatient hospice for lower GI bleed and Moderately advanced dementia, with no desired GI workup.      Lower GI bleeding  -- Last bleeding episode Sunday, none since then.   -- Patient is enrolled in GIP now     Generalized anxiety disorder  -- Ativan 0.5 mg q4hr, and trazodone 50 mg at bedtime     Anemia due to blood loss, acute  -- No longer doing labs, last hgb 7.5 on 12/10/22     Moderate to Advanced Dementia  -- Stopped Aricept        Plan:  As per the discussion with previous provider,   prefers she stays here, instead of doing hospice at home.  Discussed with daughter, Sofy, if still stable on Thurs then will need alternative options (home with hospice or private pay TCU). Sofy is inclined towards bringing her home with hospice. She just wanted to know the logistic around hospice, services she can get from hospice, [facility] etc. Social service informed about her request.      DVT Prophylaxis: none  Code Status: No CPR- Do NOT Intubate    Subjective:  Interval History: Patient seen and examined. Notes, labs, imaging reports personally reviewed. Patient is new to me today. Patient sitting up in a chair. Sofy by her side holding her hand. Patient seemed to have advance dementia, but appeared very comfortable. Awake. Answers simple questions.     Review of Systems:   As mentioned in subjective.    Patient Active Problem List   Diagnosis     Intracranial injury of other and unspecified nature, without mention of open intracranial wound, unspecified state of consciousness     Allergic rhinitis     Venous (peripheral) insufficiency     Irritable bowel syndrome     Enthesopathy     Knee pain     CARDIOVASCULAR SCREENING; LDL GOAL LESS THAN 160     Pain in joint, shoulder region     Tailor's bunion     Advanced directives, counseling/discussion     BPPV (benign  paroxysmal positional vertigo)     Chronic bilateral low back pain without sciatica     Generalized anxiety disorder     Hyperlipidemia, unspecified hyperlipidemia type     Mild incontinence     Tremor     Daytime sleepiness     Closed fracture of both ankles with routine healing, subsequent encounter     Lower GI bleeding     Anemia due to blood loss, acute     Moderate to Advanced Dementia       Scheduled Meds:    gabapentin  600 mg Oral QPM     LORazepam  0.5 mg Sublingual Q4H     traZODone  50 mg Oral At Bedtime     Continuous Infusions:  PRN Meds:.acetaminophen, artificial saliva, atropine, camphor-menthol, artificial tears, miconazole, mineral oil-hydrophilic petrolatum, [DISCONTINUED] morphine **OR** morphine sulfate, [DISCONTINUED] prochlorperazine **OR** prochlorperazine **OR** prochlorperazine, sodium chloride    Objective:  Vital signs in last 24 hours:  Temp:  [98  F (36.7  C)-98.1  F (36.7  C)] 98.1  F (36.7  C)  Pulse:  [110-111] 111  Resp:  [16-18] 18  BP: (110-115)/(60-67) 115/60  SpO2:  [96 %] 96 %        Intake/Output Summary (Last 24 hours) at 12/14/2022 0759  Last data filed at 12/13/2022 2135  Gross per 24 hour   Intake 240 ml   Output 240 ml   Net 0 ml       Physical Exam:    General: Not in obvious distress.  HEENT: NC, AT   Chest: Clear to auscultation bilaterally  Heart: S1S2 normal, regular. No M/R/G  Abdomen: Soft. NT, ND. Bowel sounds- active.  Extremities: No legs swelling  Neuro: Awake, grossly non-focal      Lab Results:(I have personally reviewed the results)    No results found for this or any previous visit (from the past 24 hour(s)).    All laboratory and imaging data in the past 24 hours reviewed  Serum Glucose range:   Recent Labs   Lab 12/10/22  0826 12/09/22  1006   GLC 95 118*     ABG: No lab results found in last 7 days.  CBC:   Recent Labs   Lab 12/10/22  0826 12/09/22  2349 12/09/22  1746 12/09/22  1006   WBC 4.7  --   --  7.8   HGB 7.5*  7.5* 7.5* 8.1* 8.2*   HCT 23.9*   --   --  26.7*   MCV 92  --   --  91     --   --  252   NEUTROPHIL  --   --   --  69   LYMPH  --   --   --  21   MONOCYTE  --   --   --  7   EOSINOPHIL  --   --   --  2     Chemistry:   Recent Labs   Lab 12/10/22  0826 12/09/22  1006    140   POTASSIUM 4.0 4.3   CHLORIDE 111* 105   CO2 24 25   BUN 17.5 29.7*   CR 0.87 0.92   GFRESTIMATED 64 60*   REBEKAH 8.2* 8.8   MAG  --  2.1     Coags:  Recent Labs   Lab 12/09/22  1746   INR 1.09     Cardiac Markers:  No results for input(s): CKTOTAL, TROPONINI in the last 168 hours.       CTA Abdomen Pelvis with Contrast    Result Date: 12/9/2022  EXAM: CTA ABDOMEN PELVIS WITH CONTRAST LOCATION: [facility] DATE/TIME: 12/9/2022 11:35 AM INDICATION: GI bleed, low abd pain COMPARISON: 08/31/2007 TECHNIQUE: CT angiogram abdomen pelvis during arterial phase of injection of IV contrast. 2D and 3D MIP reconstructions were performed by the CT technologist. Dose reduction techniques were used. CONTRAST: IsoVue 370 75mL FINDINGS: ANGIOGRAM ABDOMEN/PELVIS: No active bleeding into the gastrointestinal system. LOWER CHEST: Mild scarring in the lung bases. HEPATOBILIARY:  Mild bile duct dilatation may be from cholecystectomy. PANCREAS: Normal. SPLEEN: Normal. ADRENAL GLANDS: Normal. KIDNEYS/BLADDER: Normal. BOWEL: Small hiatal hernia. There is wall thickening of the cecum and ascending colon. There is colonic diverticulosis. No acute diverticulitis identified. LYMPH NODES: 14 mm x 11 mm partially calcified right lower quadrant mesenteric soft tissue focus with smaller surrounding nodules. PELVIC ORGANS: Hysterectomy. MUSCULOSKELETAL: Osteopenia.     IMPRESSION: 1.  No active bleeding into the gastrointestinal system. 2.  Colonic diverticulosis without definite diverticulitis. 3.  There is wall thickening of the cecum and ascending colon. 4.  14 mm x 11 mm partially calcified right lower quadrant mesenteric soft tissue, possibly representing a mesenteric tumor  such as a carcinoid tumor. Recommend further evaluation, perhaps with neuroendocrine-specific PET/CT.      Latest radiology report personally reviewed.    Note created using dragon voice recognition software so sounds alike errors may have escaped editing.      12/14/2022   Sourav Matamoros MD  Hospitalist, [facility]  Pager: [phone]

## 2022-12-14 NOTE — PROGRESS NOTES
Met with daughter Anahi to review community Hospice programs and what that service would look like in the home.  Kat and her  have been living with Anahi and her  for quite some time.  Discussed return to routine Hospice level of care given stability, and that a medication change - exchange Lorazepam for a longer acting benzodiazepine may extend GIP assessment for a day or two.  Anahi would like Kat to return to her home next Monday, and is interested in investigating Hospice further. Among her concerns are consistent caregivers and industry staffing challenges.  Will ask our  to reach out to Anahi for placement options, and connect her with the Team Director for the [facility] which would serve her home and Kat.    Visit to Kat planned for later today.    Attempting to address Dr. Matamoros's concern for continued hospitalization appropriateness.    Yosef Chambers  [facility] RN Clinical Liaison - [facility]    Office:   [phone]    Cell: [phone]    [facility]: [phone]

## 2022-12-15 NOTE — PROGRESS NOTES
Daily Progress Note        CODE STATUS:  No CPR- Do NOT Intubate    12/14/22  Assessment/Plan:  87 year old female who was admitted on 12/11/2022 to acute inpatient hospice for lower GI bleed and Moderately advanced dementia, with no desired GI workup.      Lower GI bleeding  -- Last bleeding episode Sunday, none since then.   -- Patient is enrolled in GIP now     Generalized anxiety disorder  -- Ativan 0.5 mg q4hr, and trazodone 50 mg at bedtime  -- Resumed neurontin and lexapro (home meds) per daughters request  -- Discussed at great length with spouse (who is a retired psychotherapist per report) on 12/14/22 about the ativan use. He didn't want Kat to get scheduled ativan. I reassured him that we are using our clinical judgment and that the patient is not getting ativan if she is over sedated. Defer to GIP if ativan to be changed to valium for less frequent dosing.     Anemia due to blood loss, acute  -- No longer doing labs, last hgb 7.5 on 12/10/22     Moderate to Advanced Dementia  -- Stopped Aricept        DVT Prophylaxis: none  Code Status: No CPR- Do NOT Intubate    Care plan discussed with daughterSofy over the phone. Sofy stated she is working with hospice on getting home supplies so that she can bring her mom home, likely around Monday.     Subjective:  Interval History: Patient seen and examined this morning. At her baseline. Dozing off in the recliner which is not uncommon per daughter.     Review of Systems:   As mentioned in subjective.    Patient Active Problem List   Diagnosis     Intracranial injury of other and unspecified nature, without mention of open intracranial wound, unspecified state of consciousness     Allergic rhinitis     Venous (peripheral) insufficiency     Irritable bowel syndrome     Enthesopathy     Knee pain     CARDIOVASCULAR SCREENING; LDL GOAL LESS THAN 160     Pain in joint, shoulder region     Tailor's pura     Advanced directives, counseling/discussion     BPPV  (benign paroxysmal positional vertigo)     Chronic bilateral low back pain without sciatica     Generalized anxiety disorder     Hyperlipidemia, unspecified hyperlipidemia type     Mild incontinence     Tremor     Daytime sleepiness     Closed fracture of both ankles with routine healing, subsequent encounter     Lower GI bleeding     Anemia due to blood loss, acute     Moderate to Advanced Dementia       Scheduled Meds:    escitalopram  10 mg Oral Daily     gabapentin  600 mg Oral QPM     LORazepam  0.5 mg Sublingual Q4H     traZODone  50 mg Oral At Bedtime     Continuous Infusions:  PRN Meds:.acetaminophen, artificial saliva, atropine, camphor-menthol, artificial tears, miconazole, mineral oil-hydrophilic petrolatum, [DISCONTINUED] morphine **OR** morphine sulfate, [DISCONTINUED] prochlorperazine **OR** prochlorperazine **OR** prochlorperazine, sodium chloride    Objective:  Vital signs in last 24 hours:  Temp:  [98.6  F (37  C)-98.8  F (37.1  C)] 98.8  F (37.1  C)  Pulse:  [] 115  Resp:  [18-20] 20  BP: ()/(55-60) 97/55  SpO2:  [95 %-98 %] 95 %        Intake/Output Summary (Last 24 hours) at 12/14/2022 0759  Last data filed at 12/13/2022 2135  Gross per 24 hour   Intake 240 ml   Output 240 ml   Net 0 ml       Physical Exam:    General: Not in obvious distress.  HEENT: NC, AT   Chest: Clear to auscultation bilaterally  Heart: S1S2 normal, regular. No M/R/G  Abdomen: Soft. NT, ND. Bowel sounds- active.  Extremities: No legs swelling  Neuro: Awake, grossly non-focal      Lab Results:(I have personally reviewed the results)    No results found for this or any previous visit (from the past 24 hour(s)).    All laboratory and imaging data in the past 24 hours reviewed  Serum Glucose range:   Recent Labs   Lab 12/10/22  0826 12/09/22  1006   GLC 95 118*     ABG: No lab results found in last 7 days.  CBC:   Recent Labs   Lab 12/10/22  0826 12/09/22  2349 12/09/22  1746 12/09/22  1006   WBC 4.7  --   --  7.8    HGB 7.5*  7.5* 7.5* 8.1* 8.2*   HCT 23.9*  --   --  26.7*   MCV 92  --   --  91     --   --  252   NEUTROPHIL  --   --   --  69   LYMPH  --   --   --  21   MONOCYTE  --   --   --  7   EOSINOPHIL  --   --   --  2     Chemistry:   Recent Labs   Lab 12/10/22  0826 12/09/22  1006    140   POTASSIUM 4.0 4.3   CHLORIDE 111* 105   CO2 24 25   BUN 17.5 29.7*   CR 0.87 0.92   GFRESTIMATED 64 60*   REBEKAH 8.2* 8.8   MAG  --  2.1     Coags:  Recent Labs   Lab 12/09/22  1746   INR 1.09     Cardiac Markers:  No results for input(s): CKTOTAL, TROPONINI in the last 168 hours.       CTA Abdomen Pelvis with Contrast    Result Date: 12/9/2022  EXAM: CTA ABDOMEN PELVIS WITH CONTRAST LOCATION: [facility] DATE/TIME: 12/9/2022 11:35 AM INDICATION: GI bleed, low abd pain COMPARISON: 08/31/2007 TECHNIQUE: CT angiogram abdomen pelvis during arterial phase of injection of IV contrast. 2D and 3D MIP reconstructions were performed by the CT technologist. Dose reduction techniques were used. CONTRAST: IsoVue 370 75mL FINDINGS: ANGIOGRAM ABDOMEN/PELVIS: No active bleeding into the gastrointestinal system. LOWER CHEST: Mild scarring in the lung bases. HEPATOBILIARY:  Mild bile duct dilatation may be from cholecystectomy. PANCREAS: Normal. SPLEEN: Normal. ADRENAL GLANDS: Normal. KIDNEYS/BLADDER: Normal. BOWEL: Small hiatal hernia. There is wall thickening of the cecum and ascending colon. There is colonic diverticulosis. No acute diverticulitis identified. LYMPH NODES: 14 mm x 11 mm partially calcified right lower quadrant mesenteric soft tissue focus with smaller surrounding nodules. PELVIC ORGANS: Hysterectomy. MUSCULOSKELETAL: Osteopenia.     IMPRESSION: 1.  No active bleeding into the gastrointestinal system. 2.  Colonic diverticulosis without definite diverticulitis. 3.  There is wall thickening of the cecum and ascending colon. 4.  14 mm x 11 mm partially calcified right lower quadrant mesenteric soft  tissue, possibly representing a mesenteric tumor such as a carcinoid tumor. Recommend further evaluation, perhaps with neuroendocrine-specific PET/CT.      Latest radiology report personally reviewed.    Note created using dragon voice recognition software so sounds alike errors may have escaped editing.      12/15/2022   Sourav Matamoros MD  Hospitalist, [facility]  Pager: [phone]

## 2022-12-15 NOTE — PLAN OF CARE
Goal Outcome Evaluation:    Slept well. Continues hospice/comfort cares. Q 4 hr ativan given- calm, cooperative and pleasant overnight. Pt not restless or agitated, wakes up easily and holds conversations with staff- is disoriented to all but self.

## 2022-12-15 NOTE — PLAN OF CARE
Problem: Plan of Care - These are the overarching goals to be used throughout the patient stay.    Goal: Absence of Hospital-Acquired Illness or Injury  Intervention: Identify and Manage Fall Risk  Recent Flowsheet Documentation  Taken 12/15/2022 0900 by Darion Morocho RN  Safety Promotion/Fall Prevention: activity supervised  Intervention: Prevent Skin Injury  Recent Flowsheet Documentation  Taken 12/15/2022 0900 by Darion Morocho RN  Body Position: position changed independently  Intervention: Prevent Infection  Recent Flowsheet Documentation  Taken 12/15/2022 0900 by Darion Morocho RN  Infection Prevention: single patient room provided  Goal: Optimal Comfort and Wellbeing  Intervention: Monitor Pain and Promote Comfort  Recent Flowsheet Documentation  Taken 12/15/2022 0900 by Darion Morocho RN  Pain Management Interventions: medication (see MAR)  Intervention: Provide Person-Centered Care  Recent Flowsheet Documentation  Taken 12/15/2022 0900 by Darion Morocho RN  Trust Relationship/Rapport:   care explained   questions answered   Goal Outcome Evaluation:    Pt was alert/oriented to self and denied any pain. Pt was up in chair, ate lunch and tolerated well. No discomfort observed. Ativan given x 2 and tolerated well. Family at bedside visiting and accepting treatment and cares.

## 2022-12-15 NOTE — PROGRESS NOTES
Message left with Maryellen Schwab (sp?) to whom I was transferred after speaking with Candelaria at [phone]. [facility] had phoned and reported that Friday was now the planned discharge day, and that [facility] would require a Face Sheet, Insurance information, a current medication list, and an Order from MD for Hospice Care.    No return call received as of the writing of this note from [facility].    I am on-site Thursday 12/15/22, and will continue to monitor for instruction from [facility].    Discussed POC with Nurse Anita Petty by phone and advised her to hold ativan per spouse's request. Anita related this as an awkward position to be placed where family is in disagreement regarding treatment.    Discussed medication change with Dr. Drake Angel - he would recommend based on a 1mg dose q4hr lorazepam, a 10mg Valium q8hr would seem appropriate.    Kat's current scheduled dose of lorazepam is 0.5mg q4hr. Scheduled every day Escitalopram 10mg is noted.    Reviewed MAR for Lorazepam - 9 of 15 scheduled doses successfully administered. 4 of those attempted times it is charted that family/patient refused.    As family Is apparently wishing to transfer Hospice Care to a community organization and will be leaving [facility] care, Dr. Angel is understanding of the motivation to reduce administration times as symptoms may be effectively managed with fewer doses of a similar benzodiazepine.    He defers this medication management to Community Provider to be addressed after discharge from Hospital. Current medications do not appear to require changes at this time.    [facility] intends to continue to follow patient until planned discharge to home.    Yosef Chambers  [facility] RN Clinical Liaison - [facility]    Office:   [phone]    Cell: [phone]    [place]: [phone]

## 2022-12-16 NOTE — PLAN OF CARE
Goal Outcome Evaluation:  Alert to self. Had family visiting. Denied pain. Spent most of the evening sitting on a recliner. Scheduled ativan given. Incontinent of bowel and bladder x 1. Purewick placed at bed time. Calm and cooperative with cares.    Yasmin Jones RN

## 2022-12-16 NOTE — PROGRESS NOTES
Daily Progress Note        CODE STATUS:  No CPR- Do NOT Intubate    12/14/22  Assessment/Plan:  87 year old female who was admitted on 12/11/2022 to acute inpatient hospice for lower GI bleed and Moderately advanced dementia, with no desired GI workup.      Lower GI bleeding  -- Last bleeding episode Sunday, none since then.   -- Patient is enrolled in GIP now     Generalized anxiety disorder  -- Ativan 0.5 mg q4hr, and trazodone 50 mg at bedtime  -- Resumed neurontin and lexapro (home meds) per daughters request  -- Discussed at great length with spouse (who is a retired psychotherapist per report) on 12/14/22 about the ativan use. He didn't want Kat to get scheduled ativan. I reassured him that we are using our clinical judgment and that the patient is not getting ativan if she is over sedated. Defer to GIP if ativan to be changed to valium for less frequent dosing.     Anemia due to blood loss, acute  -- No longer doing labs, last hgb 7.5 on 12/10/22     Moderate to Advanced Dementia  -- Stopped Aricept        DVT Prophylaxis: none  Code Status: No CPR- Do NOT Intubate    Care plan discussed with daughterSofy over the phone on 12/15. Sofy stated she is working with hospice on getting home supplies so that she can bring her mom home, likely around Monday.     Subjective:  Interval History: No acute issues overnight. Pleasantly confused. Co-operative with cares. Ate 100% breakfast. Asked my name 4-5 times during my visit today.     Review of Systems:   As mentioned in subjective.    Patient Active Problem List   Diagnosis     Intracranial injury of other and unspecified nature, without mention of open intracranial wound, unspecified state of consciousness     Allergic rhinitis     Venous (peripheral) insufficiency     Irritable bowel syndrome     Enthesopathy     Knee pain     CARDIOVASCULAR SCREENING; LDL GOAL LESS THAN 160     Pain in joint, shoulder region     Steve's pura     Advanced directives,  counseling/discussion     BPPV (benign paroxysmal positional vertigo)     Chronic bilateral low back pain without sciatica     Generalized anxiety disorder     Hyperlipidemia, unspecified hyperlipidemia type     Mild incontinence     Tremor     Daytime sleepiness     Closed fracture of both ankles with routine healing, subsequent encounter     Lower GI bleeding     Anemia due to blood loss, acute     Moderate to Advanced Dementia       Scheduled Meds:    escitalopram  10 mg Oral Daily     gabapentin  600 mg Oral QPM     LORazepam  0.5 mg Sublingual Q4H     traZODone  50 mg Oral At Bedtime     Continuous Infusions:  PRN Meds:.acetaminophen, artificial saliva, atropine, camphor-menthol, artificial tears, miconazole, mineral oil-hydrophilic petrolatum, [DISCONTINUED] morphine **OR** morphine sulfate, [DISCONTINUED] prochlorperazine **OR** prochlorperazine **OR** prochlorperazine, sodium chloride    Objective:  Vital signs in last 24 hours:           Intake/Output Summary (Last 24 hours) at 12/14/2022 0759  Last data filed at 12/13/2022 2135  Gross per 24 hour   Intake 240 ml   Output 240 ml   Net 0 ml       Physical Exam:    General: Not in obvious distress.  HEENT: NC, AT   Chest: Clear to auscultation bilaterally  Heart: S1S2 normal, regular. No M/R/G  Abdomen: Soft. NT, ND. Bowel sounds- active.  Extremities: No legs swelling  Neuro: Awake, grossly non-focal      Lab Results:(I have personally reviewed the results)    No results found for this or any previous visit (from the past 24 hour(s)).    All laboratory and imaging data in the past 24 hours reviewed  Serum Glucose range:   Recent Labs   Lab 12/10/22  0826 12/09/22  1006   GLC 95 118*     ABG: No lab results found in last 7 days.  CBC:   Recent Labs   Lab 12/10/22  0826 12/09/22  2349 12/09/22  1746 12/09/22  1006   WBC 4.7  --   --  7.8   HGB 7.5*  7.5* 7.5* 8.1* 8.2*   HCT 23.9*  --   --  26.7*   MCV 92  --   --  91     --   --  252   NEUTROPHIL  --    --   --  69   LYMPH  --   --   --  21   MONOCYTE  --   --   --  7   EOSINOPHIL  --   --   --  2     Chemistry:   Recent Labs   Lab 12/10/22  0826 12/09/22  1006    140   POTASSIUM 4.0 4.3   CHLORIDE 111* 105   CO2 24 25   BUN 17.5 29.7*   CR 0.87 0.92   GFRESTIMATED 64 60*   REBEKAH 8.2* 8.8   MAG  --  2.1     Coags:  Recent Labs   Lab 12/09/22  1746   INR 1.09     Cardiac Markers:  No results for input(s): CKTOTAL, TROPONINI in the last 168 hours.       CTA Abdomen Pelvis with Contrast    Result Date: 12/9/2022  EXAM: CTA ABDOMEN PELVIS WITH CONTRAST LOCATION: [facility] DATE/TIME: 12/9/2022 11:35 AM INDICATION: GI bleed, low abd pain COMPARISON: 08/31/2007 TECHNIQUE: CT angiogram abdomen pelvis during arterial phase of injection of IV contrast. 2D and 3D MIP reconstructions were performed by the CT technologist. Dose reduction techniques were used. CONTRAST: IsoVue 370 75mL FINDINGS: ANGIOGRAM ABDOMEN/PELVIS: No active bleeding into the gastrointestinal system. LOWER CHEST: Mild scarring in the lung bases. HEPATOBILIARY:  Mild bile duct dilatation may be from cholecystectomy. PANCREAS: Normal. SPLEEN: Normal. ADRENAL GLANDS: Normal. KIDNEYS/BLADDER: Normal. BOWEL: Small hiatal hernia. There is wall thickening of the cecum and ascending colon. There is colonic diverticulosis. No acute diverticulitis identified. LYMPH NODES: 14 mm x 11 mm partially calcified right lower quadrant mesenteric soft tissue focus with smaller surrounding nodules. PELVIC ORGANS: Hysterectomy. MUSCULOSKELETAL: Osteopenia.     IMPRESSION: 1.  No active bleeding into the gastrointestinal system. 2.  Colonic diverticulosis without definite diverticulitis. 3.  There is wall thickening of the cecum and ascending colon. 4.  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 Recommend further evaluation, perhaps with  neuroendocrine-specific PET/CT.      Latest radiology report personally reviewed.    Note created using dragon voice recognition software so sounds alike errors may have escaped editing.      12/16/2022   Sourav Matamoros MD  Hospitalist, [facility]  Pager: [phone]

## 2022-12-16 NOTE — PROGRESS NOTES
"Visit to Kat finds her in her bedside recliner. Spouse Mahesh is seated in a chair next to her. Kat is nibbling on pieces of plain Ravindra lettuce in a bowl on a tray on the table near her.  She expresses little insight for bleeding, or other health issues which contribute to this hospital stay. Kta denies pain or discomfort. She repeats several times during the short visit \"So you're Fairvew.\" referencing my name alexandra. Kat asks \"How old is he\" to my description of 12th birthday event. Mahesh understood the meaning of her repetition. Kat's affect remained flat through the visit.  The couple endorses feeling eager for being able to return home.  Time spent with the couple was approx 15mins.  Care coordination with Maryellen Schwab of [facility] today by phone.    Conversation is planned for tomorrow with exchange of health information. Maryellen anticipating available staffing to admit to their Hospice program on Monday.    A call is out to [facility] intake to determine availability to admit Friday or Saturday, and transfer to [facility] to assure Kat's comfort focus presence does not lapse. This in the event the hospital must discharge.    Doris Rivera RN is scheduled to be on-site tomorrow. Writer returns Saturday. Calls are encouraged to support care as needed.    Yosef Chambers  [facility] RN Clinical Liaison - [facility]    Office:   [phone]    Cell: [phone]    [place] Extension: [phone]      "

## 2022-12-16 NOTE — PLAN OF CARE
Problem: Plan of Care - These are the overarching goals to be used throughout the patient stay.    Goal: Optimal Comfort and Wellbeing  Intervention: Provide Person-Centered Care  Recent Flowsheet Documentation  Taken 12/16/2022 0100 by Atiya Ken RN  Trust Relationship/Rapport:    care explained    emotional support provided     Problem: End-of-Life Care  Goal: Comfort, Peace and Preserved Dignity  Intervention: Promote Peace and Maintain Dignity  Recent Flowsheet Documentation  Taken 12/16/2022 0100 by Atiya Ken RN  Supportive Measures: active listening utilized   Goal Outcome Evaluation:  Pt is on comfort cares. Oriented to self, calm and cooperative with cares. Denied pain. Sleeping between cares.

## 2022-12-16 NOTE — PLAN OF CARE
Problem: Plan of Care - These are the overarching goals to be used throughout the patient stay.    Goal: Absence of Hospital-Acquired Illness or Injury  Intervention: Prevent Skin Injury  Recent Flowsheet Documentation  Taken 12/16/2022 1200 by Anita Petty RN  Body Position:    right    turned  Taken 12/16/2022 0759 by Anita Petty, RN  Body Position:    left    turned     Problem: End-of-Life Care  Goal: Comfort, Peace and Preserved Dignity  Outcome: Adequate for Care Transition      Turn and reposition every 2hrs. Enjoys being up in chair for meals. Incontinent of urine. No stools. Calm and cooperative with cares. Scheduled Ativan given as ordered.

## 2022-12-17 NOTE — PLAN OF CARE
Pt is pleasantly confused. Denies pain/nausea. Repositioned every 3 hours. Ate 100% of dinner. Up to the bathroom with assist of 1 and gait belt. Voiding without difficulty.

## 2022-12-17 NOTE — PROGRESS NOTES
[facility]    Progress Note - [facility]    ______________________________________________________________________    [facility] 24/7 Contact Number: [phone]    - Providers: Please contact [facility] with changes in orders or clinical plan of care   - Nursing: Please contact [facility] with significant changes in patient condition  ______________________________________________________________________        Writer and another hospice RN, Damaris, rounded on patient this evening. Patient laying in bed with eyes open, conversed with these two nurses. Alert to self, oriented to self only. Per chart review, patient has been up in chair eating and no reports of stools today.

## 2022-12-17 NOTE — PROGRESS NOTES
Daily Progress Note        CODE STATUS:  No CPR- Do NOT Intubate    12/14/22  Assessment/Plan:  87 year old female who was admitted on 12/11/2022 to acute inpatient hospice for lower GI bleed and Moderately advanced dementia, with no desired GI workup.      Lower GI bleeding  -- Last bleeding episode Sunday, none since then.   -- Patient is enrolled in GIP now     Generalized anxiety disorder  -- Ativan 0.5 mg q4hr, and trazodone 50 mg at bedtime  -- Resumed neurontin and lexapro (home meds) per daughters request  -- Discussed at great length with spouse (who is a retired psychotherapist per report) on 12/14/22 about the ativan use. He didn't want Kat to get scheduled ativan. I reassured him that we are using our clinical judgment and that the patient is not getting ativan if she is over sedated. Defer to GIP if ativan to be changed to valium for less frequent dosing.     Anemia due to blood loss, acute  -- No longer doing labs, last hgb 7.5 on 12/10/22     Moderate to Advanced Dementia  -- Stopped Aricept        DVT Prophylaxis: none  Code Status: No CPR- Do NOT Intubate    Care plan discussed with daughterSofy over the phone on 12/15. Sofy stated she is working with hospice on getting home supplies so that she can bring her mom home, likely around Monday.     Subjective:  Interval History: No new issues overnight. Patient has a good appetite. Pleasantly confused. Oriented to self only. Co-operative with cares.      Review of Systems:   As mentioned in subjective.    Patient Active Problem List   Diagnosis     Intracranial injury of other and unspecified nature, without mention of open intracranial wound, unspecified state of consciousness     Allergic rhinitis     Venous (peripheral) insufficiency     Irritable bowel syndrome     Enthesopathy     Knee pain     CARDIOVASCULAR SCREENING; LDL GOAL LESS THAN 160     Pain in joint, shoulder region     Steve's pura     Advanced directives, counseling/discussion      BPPV (benign paroxysmal positional vertigo)     Chronic bilateral low back pain without sciatica     Generalized anxiety disorder     Hyperlipidemia, unspecified hyperlipidemia type     Mild incontinence     Tremor     Daytime sleepiness     Closed fracture of both ankles with routine healing, subsequent encounter     Lower GI bleeding     Anemia due to blood loss, acute     Moderate to Advanced Dementia       Scheduled Meds:    escitalopram  10 mg Oral Daily     gabapentin  600 mg Oral QPM     LORazepam  0.5 mg Sublingual Q4H     traZODone  50 mg Oral At Bedtime     Continuous Infusions:  PRN Meds:.acetaminophen, artificial saliva, atropine, camphor-menthol, artificial tears, miconazole, mineral oil-hydrophilic petrolatum, [DISCONTINUED] morphine **OR** morphine sulfate, [DISCONTINUED] prochlorperazine **OR** prochlorperazine **OR** prochlorperazine, sodium chloride    Objective:  Vital signs in last 24 hours:  Temp:  [98.9  F (37.2  C)] 98.9  F (37.2  C)  Pulse:  [112] 112  Resp:  [16] 16  BP: (117)/(56) 117/56  SpO2:  [95 %] 95 %        Intake/Output Summary (Last 24 hours) at 12/14/2022 0759  Last data filed at 12/13/2022 2135  Gross per 24 hour   Intake 240 ml   Output 240 ml   Net 0 ml       Physical Exam:    General: Not in obvious distress.  HEENT: NC, AT   Chest: Clear to auscultation bilaterally  Heart: S1S2 normal, regular. No M/R/G  Abdomen: Soft. NT, ND. Bowel sounds- active.  Extremities: No legs swelling  Neuro: Awake, grossly non-focal      Lab Results:(I have personally reviewed the results)    No results found for this or any previous visit (from the past 24 hour(s)).    All laboratory and imaging data in the past 24 hours reviewed  Serum Glucose range:   Recent Labs   Lab 12/10/22  0826   GLC 95     ABG: No lab results found in last 7 days.  CBC:   Recent Labs   Lab 12/10/22  0826   WBC 4.7   HGB 7.5*  7.5*   HCT 23.9*   MCV 92        Chemistry:   Recent Labs   Lab 12/10/22  0826   NA  143   POTASSIUM 4.0   CHLORIDE 111*   CO2 24   BUN 17.5   CR 0.87   GFRESTIMATED 64   REBEKAH 8.2*     Coags:  No results for input(s): INR, PROTIME, PTT in the last 168 hours.    Invalid input(s): APTT  Cardiac Markers:  No results for input(s): CKTOTAL, TROPONINI in the last 168 hours.       CTA Abdomen Pelvis with Contrast    Result Date: 12/9/2022  EXAM: CTA ABDOMEN PELVIS WITH CONTRAST LOCATION: [facility] DATE/TIME: 12/9/2022 11:35 AM INDICATION: GI bleed, low abd pain COMPARISON: 08/31/2007 TECHNIQUE: CT angiogram abdomen pelvis during arterial phase of injection of IV contrast. 2D and 3D MIP reconstructions were performed by the CT technologist. Dose reduction techniques were used. CONTRAST: IsoVue 370 75mL FINDINGS: ANGIOGRAM ABDOMEN/PELVIS: No active bleeding into the gastrointestinal system. LOWER CHEST: Mild scarring in the lung bases. HEPATOBILIARY:  Mild bile duct dilatation may be from cholecystectomy. PANCREAS: Normal. SPLEEN: Normal. ADRENAL GLANDS: Normal. KIDNEYS/BLADDER: Normal. BOWEL: Small hiatal hernia. There is wall thickening of the cecum and ascending colon. There is colonic diverticulosis. No acute diverticulitis identified. LYMPH NODES: 14 mm x 11 mm partially calcified right lower quadrant mesenteric soft tissue focus with smaller surrounding nodules. PELVIC ORGANS: Hysterectomy. MUSCULOSKELETAL: Osteopenia.     IMPRESSION: 1.  No active bleeding into the gastrointestinal system. 2.  Colonic diverticulosis without definite diverticulitis. 3.  There is wall thickening of the cecum and ascending colon. 4.  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 Recommend further evaluation, perhaps with neuroendocrine-specific PET/CT.      Latest radiology report personally reviewed.    Note created using dragon voice recognition software so sounds alike errors may have escaped  editing.      12/17/2022   Sourav Matamoros MD  Hospitalist, [facility] [place]  Pager: [phone]

## 2022-12-17 NOTE — PLAN OF CARE
Problem: End-of-Life Care  Goal: Comfort, Peace and Preserved Dignity  Intervention: Promote Physical Comfort  Recent Flowsheet Documentation  Taken 12/17/2022 0930 by Melinda Lee RN  Environmental Support: calm environment promoted   Goal Outcome Evaluation:       Pt is alert and oriented to self, pleasant and cooperative.  Pt denies pain, is able to turn and reposition self in bed, is incontinent of bowel and bladder.  Family present at bedside this afternoon.    Melinda Lee, RN

## 2022-12-17 NOTE — PLAN OF CARE
Goal Outcome Evaluation:    Pt slept well through the night, oriented to self. Denies pain. No nausea. Turned and repositioned. Refused scheduled Ativan. Comfort cares continued.

## 2022-12-17 NOTE — PROGRESS NOTES
Kat is sitting up in her hospital bed - head raised to about 30 degrees. TV is tuned to public television station, [employer] is on. Kat is unable to describe what she is watching despite cueing. She appears baseline, pleasant affect, no pain, verbal with distinct language and scattered/disorganized thinking.  Left copy of materials faxed to [facility] att Maryellen Schwab this morning at 1130. Discussed status with daughter Anahi by phone pre visit.  NSS of acute distress.  Hospice following with daily visits. Will coordinate with [facility] community admission as needed.    Yosef Chambers  [facility] RN Clinical Liaison - [facility]    Office:   [phone]    Cell: [phone]    [place]: [phone]

## 2022-12-18 NOTE — PLAN OF CARE
Problem: Plan of Care - These are the overarching goals to be used throughout the patient stay.    Goal: Optimal Comfort and Wellbeing  Intervention: Monitor Pain and Promote Comfort  Recent Flowsheet Documentation  Taken 12/18/2022 1230 by Melinda Lee RN  Pain Management Interventions:   emotional support   repositioned  Taken 12/18/2022 0930 by Melinda Lee RN  Pain Management Interventions:   emotional support   repositioned   Goal Outcome Evaluation:       Pt drowsy at times, oriented to person only, spouse present at bedside this afternoon.  Pt able to turn self in bed, incontinent of bowel and bladder, appetite good.  Plan is for Pt to discharge home on hospice.    Melinda Lee RN

## 2022-12-18 NOTE — PROGRESS NOTES
Daily Progress Note        CODE STATUS:  No CPR- Do NOT Intubate    12/14/22  Assessment/Plan:  87 year old female who was admitted on 12/11/2022 to acute inpatient hospice for lower GI bleed and Moderately advanced dementia, with no desired GI workup.      Lower GI bleeding  -- Last bleeding episode Sunday, none since then.   -- Patient is enrolled in GIP now     Generalized anxiety disorder  -- Ativan 0.5 mg q4hr, and trazodone 50 mg at bedtime  -- Resumed neurontin and lexapro (home meds) per daughters request  -- Discussed at great length with spouse (who is a retired psychotherapist per report) on 12/14/22 about the ativan use. He didn't want Kat to get scheduled ativan. I reassured him that we are using our clinical judgment and that the patient is not getting ativan if she is over sedated. Defer to GIP if ativan to be changed to valium for less frequent dosing.     Anemia due to blood loss, acute  -- No longer doing labs, last hgb 7.5 on 12/10/22     Moderate to Advanced Dementia  -- Stopped Aricept        DVT Prophylaxis: none  Code Status: No CPR- Do NOT Intubate    Subjective:  Interval History: Doing about the same. Eating well. Pleasantly confused. Oriented to self only. Co-operative with cares. No further bleeding    Review of Systems:   As mentioned in subjective.    Patient Active Problem List   Diagnosis     Intracranial injury of other and unspecified nature, without mention of open intracranial wound, unspecified state of consciousness     Allergic rhinitis     Venous (peripheral) insufficiency     Irritable bowel syndrome     Enthesopathy     Knee pain     CARDIOVASCULAR SCREENING; LDL GOAL LESS THAN 160     Pain in joint, shoulder region     Steve's bunion     Advanced directives, counseling/discussion     BPPV (benign paroxysmal positional vertigo)     Chronic bilateral low back pain without sciatica     Generalized anxiety disorder     Hyperlipidemia, unspecified hyperlipidemia type      Mild incontinence     Tremor     Daytime sleepiness     Closed fracture of both ankles with routine healing, subsequent encounter     Lower GI bleeding     Anemia due to blood loss, acute     Moderate to Advanced Dementia       Scheduled Meds:    escitalopram  10 mg Oral Daily     gabapentin  600 mg Oral QPM     LORazepam  0.5 mg Sublingual Q4H     traZODone  50 mg Oral At Bedtime     Continuous Infusions:  PRN Meds:.acetaminophen, artificial saliva, atropine, camphor-menthol, artificial tears, miconazole, mineral oil-hydrophilic petrolatum, [DISCONTINUED] morphine **OR** morphine sulfate, [DISCONTINUED] prochlorperazine **OR** prochlorperazine **OR** prochlorperazine, sodium chloride    Objective:  Vital signs in last 24 hours:           Intake/Output Summary (Last 24 hours) at 12/14/2022 0759  Last data filed at 12/13/2022 2135  Gross per 24 hour   Intake 240 ml   Output 240 ml   Net 0 ml       Physical Exam:    General: Not in obvious distress.  HEENT: NC, AT   Chest: Clear to auscultation bilaterally  Heart: S1S2 normal, regular. No M/R/G  Abdomen: Soft. NT, ND. Bowel sounds- active.  Extremities: No legs swelling  Neuro: Awake, grossly non-focal      Lab Results:(I have personally reviewed the results)    No results found for this or any previous visit (from the past 24 hour(s)).    All laboratory and imaging data in the past 24 hours reviewed  Serum Glucose range:   No results for input(s): GLC, BGM in the last 168 hours.  ABG: No lab results found in last 7 days.  CBC:   No results for input(s): WBC, HGB, HCT, MCV, PLT, NEUTROPHIL, LYMPH, MONOCYTE, EOSINOPHIL in the last 168 hours.  Chemistry:   No results for input(s): NA, POTASSIUM, CHLORIDE, CO2, BUN, CR, GFRESTIMATED, GLUWH, REBEKAH, MAG, PROTTOTAL, ALBUMIN, AST, ALT, ALKPHOS, BILITOTAL, ESME in the last 168 hours.  Coags:  No results for input(s): INR, PROTIME, PTT in the last 168 hours.    Invalid input(s): APTT  Cardiac Markers:  No results for input(s):  CKTOTAL, TROPONINI in the last 168 hours.       CTA Abdomen Pelvis with Contrast    Result Date: 12/9/2022  EXAM: CTA ABDOMEN PELVIS WITH CONTRAST LOCATION: [facility] DATE/TIME: 12/9/2022 11:35 AM INDICATION: GI bleed, low abd pain COMPARISON: 08/31/2007 TECHNIQUE: CT angiogram abdomen pelvis during arterial phase of injection of IV contrast. 2D and 3D MIP reconstructions were performed by the CT technologist. Dose reduction techniques were used. CONTRAST: IsoVue 370 75mL FINDINGS: ANGIOGRAM ABDOMEN/PELVIS: No active bleeding into the gastrointestinal system. LOWER CHEST: Mild scarring in the lung bases. HEPATOBILIARY:  Mild bile duct dilatation may be from cholecystectomy. PANCREAS: Normal. SPLEEN: Normal. ADRENAL GLANDS: Normal. KIDNEYS/BLADDER: Normal. BOWEL: Small hiatal hernia. There is wall thickening of the cecum and ascending colon. There is colonic diverticulosis. No acute diverticulitis identified. LYMPH NODES: 14 mm x 11 mm partially calcified right lower quadrant mesenteric soft tissue focus with smaller surrounding nodules. PELVIC ORGANS: Hysterectomy. MUSCULOSKELETAL: Osteopenia.     IMPRESSION: 1.  No active bleeding into the gastrointestinal system. 2.  Colonic diverticulosis without definite diverticulitis. 3.  There is wall thickening of the cecum and ascending colon. 4.  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 Recommend further evaluation, perhaps with neuroendocrine-specific PET/CT.      Latest radiology report personally reviewed.    Note created using dragon voice recognition software so sounds alike errors may have escaped editing.      12/18/2022   Sourav Matamoros MD  Hospitalist, [facility]  Pager: [phone]

## 2022-12-18 NOTE — PLAN OF CARE
Problem: End-of-Life Care  Goal: Comfort, Peace and Preserved Dignity  12/18/2022 0435 by Anita Quintero, RN  Outcome: Progressing  12/18/2022 0427 by Anita Quintero, RN  Outcome: Progressing     Problem: Plan of Care - These are the overarching goals to be used throughout the patient stay.    Goal: Absence of Hospital-Acquired Illness or Injury  Intervention: Prevent Skin Injury  Recent Flowsheet Documentation  Taken 12/18/2022 0252 by Anita Quintero, RN  Body Position: side-lying   Goal Outcome Evaluation:    Pt is oriented to self only, pleasantly confused. Denies pain. Ativan administered as scheduled. Sleeping on her side, looks comfortable. On room air. Calm and cooperative.

## 2022-12-18 NOTE — PLAN OF CARE
Pt is pleasantly confused. Denies pain/nausea. Eating well. Incontinent of bowel and bladder. Able to turn self independently.

## 2022-12-18 NOTE — PROGRESS NOTES
Spoke with Kat's daughter Anahi to coordinate discharge for tomorrow.  [facility] will arrive at [facility] scheduled for 100pm to stretcher transport, and use a stair chair to descend a total of 12 steps to Kat's apartment in the basement of the home in [place].  No DME is ordered as [facility] is admitting from home, and per Anahi, has made arrangement for delivery of materials today.  Discussed POC with Nurse on P1 Ria Browning who concurs that stretcher transport is required although Kat has been walking a few steps to the bathroom, and was able to sit up for a short time to eat beginning Friday.  EMS will use a stair chair to descend to the basement. Ria and I agree that stretcher is appropriate for this transport.    Yosef TITUS RN Clinical Liaison - [facility]    Office:   [phone]    Cell: [phone]    [facility] Extension: [phone]

## 2022-12-19 NOTE — PLAN OF CARE
Goal Outcome Evaluation:    Pt oriented to self. Denies pain and nausea. Scheduled ativan given. Incontinent of urine x1. Plan is to discharge home with hospice today.

## 2022-12-19 NOTE — DISCHARGE SUMMARY
[facility] MEDICINE  DISCHARGE SUMMARY     Primary Care Physician: Ayse Cornejo  Admission Date: 12/11/2022   Discharge Provider: ISH Frances Discharge Date: 12/19/2022   Diet: as below   Code Status: No CPR- Do NOT Intubate   Activity: DCACTIVITY: Activity as tolerated        Condition at Discharge: Stable     REASON FOR PRESENTATION(See Admission Note for Details)   Lower GI bleeding    PRINCIPAL & ACTIVE DISCHARGE DIAGNOSES     Principal Problem:    Lower GI bleeding  Active Problems:    Generalized anxiety disorder    Anemia due to blood loss, acute    Moderate to Advanced Dementia      PENDING LABS     Unresulted Labs Ordered in the Past 30 Days of this Admission     No orders found from 11/11/2022 to 12/12/2022.            PROCEDURES ( this hospitalization only)          RECOMMENDATIONS TO OUTPATIENT PROVIDER FOR F/U VISIT     Follow-up Appointments     Follow-up and recommended labs and tests       Hospice service to follow the patient at home.                 DISPOSITION     Home with hospice    SUMMARY OF HOSPITAL COURSE:      87 year old female who was admitted on 12/11/2022 to acute inpatient hospice for lower GI bleed and moderately advanced dementia, with no desired GI workup.      Lower GI bleeding  -- Resolved spontaneously  -- Patient is enrolled in hospice care now     Generalized anxiety disorder  -- Ativan 0.5 mg q4hr, and trazodone 50 mg at bedtime  -- Resumed neurontin and lexapro (home meds) per daughters request  -- Discussed at great length with spouse (who is a retired psychotherapist per report) on 12/14/22 about the ativan use. He didn't want Kat to get scheduled ativan. I reassured him that we are using our clinical judgment and that the patient is not getting ativan if she is over sedated. Defer to GIP if ativan to be changed to valium for less frequent dosing.     Anemia due to blood loss, acute  -- No longer doing labs, last hgb  7.5 on 12/10/22     Moderate to Advanced Dementia  -- Stopped Aricept  -- Home today with hospice care.    Discharge Medications with Med changes:     Discharge Medication List as of 12/19/2022 12:56 PM      START taking these medications    Details   LORazepam (ATIVAN) 2 MG/ML (HIGH CONC) oral solution Place 0.25 mLs (0.5 mg) under the tongue every 4 hours, Disp-5 mL, R-0, E-PrescribeHold for over sedation.      menthol-zinc oxide (CALMOSEPTINE) 0.44-20.6 % OINT ointment Apply topically 4 times daily as needed for skin protectionDisp-20 g, [id number]      traZODone (DESYREL) 50 MG tablet Take 1 tablet (50 mg) by mouth At Bedtime, Disp-30 tablet, R-0, E-Prescribe         CONTINUE these medications which have NOT CHANGED    Details   acetaminophen (TYLENOL) 500 MG tablet Take 500 mg by mouth every 6 hours as needed, Historical      Blood Pressure Monitoring (BP MONITOR-STETHOSCOPE) KIT Check bp daily, Disp-1 kit, R-0, Local Print      escitalopram (LEXAPRO) 10 MG tablet Take 1 tablet (10 mg) by mouth every morning, Disp-90 tablet, R-3, E-Prescribe      gabapentin (NEURONTIN) 600 MG tablet Take 1 tablet (600 mg) by mouth daily, Disp-90 tablet, R-3, E-Prescribe         STOP taking these medications       aspirin (ASA) 81 MG chewable tablet Comments:   Reason for Stopping:         calcium carbonate 1250 (500 Ca) MG CHEW Comments:   Reason for Stopping:         cholecalciferol 50 MCG (2000 UT) tablet Comments:   Reason for Stopping:         donepezil (ARICEPT) 10 MG tablet Comments:   Reason for Stopping:         ferrous sulfate (FEROSUL) 325 (65 Fe) MG tablet Comments:   Reason for Stopping:         metoprolol tartrate (LOPRESSOR) 25 MG tablet Comments:   Reason for Stopping:         rosuvastatin (CRESTOR) 10 MG tablet Comments:   Reason for Stopping:                     Rationale for medication changes:      Please see above        Consults   Hospice      Immunizations given this encounter     Most Recent  Immunizations   Administered Date(s) Administered     COVID-19 Vaccine 12+ (Pfizer) 09/27/2021     FLU 6-35 months 10/18/2011     Influenza (H1N1) 01/08/2010     Influenza (IIV3) PF 10/18/2011     Influenza Vaccine 65+ (Fluzone HD) 09/07/2022     Pneumo Conj 13-V (2010&after) 09/10/2021     Pneumococcal 20 valent Conjugate (Prevnar 20) 09/07/2022     TD (ADULT, 7+) 12/21/2010     Zoster vaccine, live 12/21/2010           Anticoagulation Information      Recent INR results: No results for input(s): INR in the last 168 hours.  Warfarin doses (if applicable) or name of other anticoagulant: NA      SIGNIFICANT IMAGING FINDINGS     No results found for this visit on 12/11/22.    SIGNIFICANT LABORATORY FINDINGS     Most Recent 3 CBC's:Recent Labs   Lab Test 12/10/22  0826 12/09/22  2349 12/09/22  1746 12/09/22  1006 09/07/22  1600   WBC 4.7  --   --  7.8 7.5   HGB 7.5*  7.5* 7.5* 8.1* 8.2* 8.5*   MCV 92  --   --  91 77*     --   --  252 316     Most Recent 3 BMP's:Recent Labs   Lab Test 12/10/22  0826 12/09/22  1006 09/07/22  1600    140 136   POTASSIUM 4.0 4.3 4.7   CHLORIDE 111* 105 100   CO2 24 25 24   BUN 17.5 29.7* 28.3*   CR 0.87 0.92 0.76   ANIONGAP 8 10 12   REBEKAH 8.2* 8.8 9.1   GLC 95 118* 100*     Most Recent 2 LFT's:Recent Labs   Lab Test 09/07/22  1600 03/09/21  1136   AST 26 22   ALT 14 16   ALKPHOS 58 78   BILITOTAL <0.2 0.3     Most Recent 3 INR's:Recent Labs   Lab Test 12/09/22  1746   INR 1.09         Discharge Orders        Reason for your hospital stay    GI bleeding     Activity    Your activity upon discharge: activity as tolerated     Follow-up and recommended labs and tests     Hospice service to follow the patient at home.     Diet    Follow this diet upon discharge: Orders Placed This Encounter      Room Service      Regular Diet Adult       Examination   /56 (BP Location: Left arm)   Pulse 112   Temp 98.9  F (37.2  C) (Oral)   Resp 16   SpO2 95%   General: Not in obvious  distress.  HEENT: NC, AT   Chest: Clear to auscultation bilaterally  Heart: S1S2 normal, regular. No M/R/G  Abdomen: Soft. NT, ND. Bowel sounds- active.  Extremities: No legs swelling  Neuro: Awake, grossly non-focal    Please see EMR for more detailed significant labs, imaging, consultant notes etc.    I, ISH Frances, personally saw the patient today and spent greater than 30 minutes discharging this patient.    ISH Frances  [facility]    CC:Ayse Cornejo

## 2022-12-19 NOTE — PLAN OF CARE
Pt denies pain/nausea. Ativan given per order. Has good appetite. Voiding without difficulty. NA reported to writer pt had 1 BM later this evening that was brown/black with red blood in the toilet bowl. No other occurrences.

## 2022-12-19 NOTE — PLAN OF CARE
Goal Outcome Evaluation:  Pt denies pain when asked. Pt alert to self only. Pt had a good appetite eating 100% of both meals. Pt assist of 1 with cares/eating/turns. Pt turned every two hours. Family present this morning at the bedside. Bed alarm on for safety. Pt d/cing home-discharge Home medications given to  prior to discharge.

## 2022-12-21 NOTE — TELEPHONE ENCOUNTER
Yun was notified of provider message. She will get all the paperwork from hospice faxed to  within the next couple of days.  Mariel Saxena RN on 12/21/2022 at 2:30 PM

## 2022-12-21 NOTE — TELEPHONE ENCOUNTER
General Call      Reason for Call:  [facility] calling to let PCP know that patient was admitted to hospice on 12/19/2022    Needing to know if OK to continue Gabapentin 600mg at bedtime, Lexapro 10mg in a.m.     If no problem, no call back needed    What are your questions or concerns:  n/a    Date of last appointment with provider: n/a    Could we send this information to you in [facility] or would you prefer to receive a phone call?:   No preference   Okay to leave a detailed message?: Yes at Other phone number:  [phone]

## 2022-12-21 NOTE — TELEPHONE ENCOUNTER
Dr. Cornejo is gone for the next 2 weeks.  I would continue all current medications until we can clarify with her on her return.

## 2022-12-21 NOTE — PROGRESS NOTES
Contact   Chart Review     Situation: Patient chart reviewed by .    Background: Patient enrolled in care coordination, now enrolled in hospice.     Assessment: Talked to daughter Sofy and they are very pleased with [facility].  Her dad is anxious and not doing well however.  No other concerns.      Plan/Recommendations: Will route note to dad's pcp to address.  Will close patient to care coordination as she is now in hospice.     Brenda Ohara,   [facility]  [phone]

## 2023-01-01 ENCOUNTER — TELEPHONE (OUTPATIENT)
Dept: INTERNAL MEDICINE | Facility: CLINIC | Age: 88
End: 2023-01-01
Payer: COMMERCIAL

## 2023-01-17 NOTE — TELEPHONE ENCOUNTER
January 17, 2023    [facility] Forms: Home health care orders were received via fax for [facility] Providers: Dr. Cornejo to sign.  Patient label was attached to paperwork and placed in provider's inbox to be signed.    Cas Guillen III

## 2023-01-19 NOTE — TELEPHONE ENCOUNTER
January 19, 2023    [facility] Forms: Med/Cert. Statement for 1st 90-day period received from outbox of [facility] Providers: Dr. Cornejo.  Paperwork has been reviewed and is complete.  Per initial initial request, this was sent via fax to [phone].     Cas Guillen III

## 2023-01-26 ENCOUNTER — MEDICAL CORRESPONDENCE (OUTPATIENT)
Dept: HEALTH INFORMATION MANAGEMENT | Facility: CLINIC | Age: 88
End: 2023-01-26

## 2023-01-27 ENCOUNTER — TELEPHONE (OUTPATIENT)
Dept: INTERNAL MEDICINE | Facility: CLINIC | Age: 88
End: 2023-01-27
Payer: COMMERCIAL

## 2023-01-27 NOTE — TELEPHONE ENCOUNTER
January 27, 2023    [facility] Forms: Home health care orders were received via fax for [facility] Providers: Dr. Cornejo to sign.  Patient paperwork placed in provider's inbox to be signed.    MARY AYALA

## 2023-01-30 NOTE — TELEPHONE ENCOUNTER
January 30, 2023    [facility] Forms: [facility]  received from outbox of [facility] Providers: Dr. Cornejo.  Paperwork has been reviewed and is complete.  Per initial initial request, this was sent via fax to [phone].     Esther Amin

## 2023-01-30 NOTE — PROGRESS NOTES
[facility]    Facility:   [facility] [[id number]]   Code Status: DNR/DNI      CHIEF COMPLAINT/REASON FOR VISIT:  Chief Complaint   Patient presents with     Follow Up     rehab, ankles, BP,        HISTORY:      HPI: Kat is a 85 y.o. female who had the opportunity to revisit with her and her  today at only secondary to her hospitalization December 29, 2019 through January 1, 2020 secondary to a syncopal episode thereby sustaining bilateral ankle fractures but also talked about the rehabilitation process.  Both her and her  at one point were living at the assisted living facility on this campus.  They have now since moved out.  Her  was in the hospital for a couple of days now has been living with the daughter.  The plan is to live with the daughter from here on out.  She recently has had an opportunity to visit with orthopedics she is not weightbearing as tolerated making pretty good progress does have her cam boots.  I do not know if there is any other current follow-up or not to her orthopedic visit but they are open to bring her home in the next couple of weeks.  Recently did her cholesterol check on February 12.  She has been normotensive and afebrile and also on room air.  No bowel or bladder issues.  Appetite is good.  Taking Aricept.  For neuropathy having good success with Neurontin 600 mg twice daily also getting extra nutrient supplements.    Past Medical History:   Diagnosis Date     Dementia (H)      Hypotension      Neuropathy              No family history on file.  Social History     Socioeconomic History     Marital status:      Spouse name: Not on file     Number of children: Not on file     Years of education: Not on file     Highest education level: Not on file   Occupational History     Not on file   Social Needs     Financial resource strain: Not on file     Food insecurity:     Worry: Not on file     Inability: Not  The left leg was positioned using the following devices: wedge.  The right leg was positioned using the following devices: wedge. on file     Transportation needs:     Medical: Not on file     Non-medical: Not on file   Tobacco Use     Smoking status: Never Smoker     Smokeless tobacco: Never Used   Substance and Sexual Activity     Alcohol use: Not Currently     Drug use: Never     Sexual activity: Not Currently   Lifestyle     Physical activity:     Days per week: Not on file     Minutes per session: Not on file     Stress: Not on file   Relationships     Social connections:     Talks on phone: Not on file     Gets together: Not on file     Attends Sabianism service: Not on file     Active member of club or organization: Not on file     Attends meetings of clubs or organizations: Not on file     Relationship status: Not on file     Intimate partner violence:     Fear of current or ex partner: Not on file     Emotionally abused: Not on file     Physically abused: Not on file     Forced sexual activity: Not on file   Other Topics Concern     Not on file   Social History Narrative     Not on file         Review of Systems  Currently she denies chills and fever coughing wheezing chest pain dizziness or vertigo nausea vomiting diarrhea dysuria flulike symptoms headache or stiff neck.  History of cognitive impairment hyperlipidemia and syncopal episodes and most recently bilateral ankle fracture secondary to a fall.  Neuropathic pain.    Current Outpatient Medications   Medication Sig     acetaminophen (TYLENOL) 500 MG tablet Take 1 tablet (500 mg total) by mouth every 6 (six) hours as needed for pain or fever.     aspirin 81 mg chewable tablet Chew 1 tablet (81 mg total) 2 (two) times a day.     bisacodyl (DULCOLAX) 10 mg suppository Insert suppository rectally daily as needed for treatment of constipation.     donepezil (ARICEPT) 10 MG tablet Take 10 mg by mouth at bedtime.     gabapentin (NEURONTIN) 600 MG tablet Take 600 mg by mouth 2 (two) times a day.      magnesium hydroxide (MILK OF MAG) 400 mg/5 mL Susp suspension Take 30 mL by mouth  daily as needed.     polyethylene glycol (MIRALAX) 17 gram packet Take 1 packet (17 g total) by mouth daily as needed.     polyvinyl alcohol (LIQUIFILM TEARS) 1.4 % ophthalmic solution Apply 1 drop to eye as needed for dry eyes.     rosuvastatin (CRESTOR) 10 MG tablet Take 10 mg by mouth daily.        There were no vitals filed for this visit.  Blood pressure 99/63 pulse 74 respirations 16 temperature 97.3  Physical Exam  Head is normocephalic.     Lungs are clear throughout.  Cardiovascular is normal without murmurs.  No lower extremity edema.  Gastrointestinal nondistended.  Musculoskeletal moves upper extremities.  Bilateral lower extremities in Cam boots.  Positive CMS to her feet as well as popliteal pulses.  Psychiatric Pleasant affect.  Does have cognitive impairment.          LABS:   Lab Results   Component Value Date    WBC 7.5 12/30/2019    HGB 11.3 (L) 12/30/2019    HCT 39.1 12/29/2019    MCV 93 12/29/2019     12/29/2019     Results for orders placed or performed during the hospital encounter of 12/29/19   Basic metabolic panel   Result Value Ref Range    Sodium 140 136 - 145 mmol/L    Potassium 4.2 3.5 - 5.0 mmol/L    Chloride 107 98 - 107 mmol/L    CO2 25 22 - 31 mmol/L    Anion Gap, Calculation 8 5 - 18 mmol/L    Glucose 96 70 - 125 mg/dL    Calcium 9.1 8.5 - 10.5 mg/dL    BUN 19 8 - 28 mg/dL    Creatinine 0.87 0.60 - 1.10 mg/dL    GFR MDRD Af Amer >60 >60 mL/min/1.73m2    GFR MDRD Non Af Amer >60 >60 mL/min/1.73m2     Lab Results   Component Value Date    CHOL 132 02/12/2020     Lab Results   Component Value Date    HDL 39 (L) 02/12/2020     Lab Results   Component Value Date    LDLCALC 56 02/12/2020     Lab Results   Component Value Date    TRIG 185 (H) 02/12/2020     No components found for: CHOLHDL      ASSESSMENT:      ICD-10-CM    1. Closed fracture of both ankles with routine healing, subsequent encounter S82.891D     S82.892D    2. Cognitive impairment R41.89    3. Hypotension,  unspecified hypotension type I95.9    4. Neuropathy G62.9        PLAN:    No further changes at this time.  Blood pressures are fine pain is also well managed.  Making some progress from therapy.  Does have cognitive impairment.  I did have a chance again to talk to the  today about the treatment plan as well as living at the daughter's home.  He did not have any other questions.        Electronically signed by: Michael Duane Johnson, CNP

## 2023-01-31 ENCOUNTER — TELEPHONE (OUTPATIENT)
Dept: INTERNAL MEDICINE | Facility: CLINIC | Age: 88
End: 2023-01-31

## 2023-01-31 NOTE — TELEPHONE ENCOUNTER
January 31, 2023    [facility] Forms: Outside records received from [facility] Discharge Summary.  Records were reviewed and sent to HIM to be scanned into the patient's chart.  Records were faxed to HIM:  [phone]    Esther Amin

## 2023-02-01 ENCOUNTER — TELEPHONE (OUTPATIENT)
Dept: INTERNAL MEDICINE | Facility: CLINIC | Age: 88
End: 2023-02-01

## 2023-02-01 ENCOUNTER — TELEPHONE (OUTPATIENT)
Dept: INTERNAL MEDICINE | Facility: CLINIC | Age: 88
End: 2023-02-01
Payer: COMMERCIAL

## 2023-02-01 NOTE — TELEPHONE ENCOUNTER
February 1, 2023    [facility] Forms: [facility] Medication Home health care orders were received via fax for [facility] Providers: Dr. Cornejo to sign.  Patient label was attached to paperwork and placed in provider's inbox to be signed.    Esther Amin

## 2023-02-06 NOTE — TELEPHONE ENCOUNTER
February 6, 2023    [facility] Forms: [facility] received from outbox of [facility] Providers: Dr. Cornejo.  Paperwork has been reviewed and is complete.  Per initial initial request, this was sent via fax to [phone].     Pam J. Behr

## 2023-09-20 NOTE — PROGRESS NOTES
[facility]    Facility:   [facility] [[id number]]   Code Status: DNR      CHIEF COMPLAINT/REASON FOR VISIT:  Chief Complaint   Patient presents with     Follow Up     rehab, ankles.       HISTORY:      HPI: Kat is a 84 y.o. female who had the chance to visit with she and her  secondary to her hospitalization December 29, 2019 through January 1, 2020 secondary syncope and acute bilateral ankle fractures.  She apparently did see orthopedics yesterday I do not have any of those notes but apparently she is still nonweightbearing.  She does wear the cam boots when she is up and about but off while in bed.  No pain issues.  Tired of laying around in bed they are working with slide board transfers.  No Tylenol as needed.  Blood pressures for the most part still less than 110 systolically recently change the Midrin 5 mg 3 times a day rather than 10 mg 3 times daily and if they continue to stay low we will continue to decrease her Midodrin.  For the neuropathy is on gabapentin.  Had a chance to talk about her stay as well as the rehabilitation process.  At this point he did not have any other questions.    Past Medical History:   Diagnosis Date     Dementia (H)      Hypotension      Neuropathy              No family history on file.  Social History     Socioeconomic History     Marital status:      Spouse name: Not on file     Number of children: Not on file     Years of education: Not on file     Highest education level: Not on file   Occupational History     Not on file   Social Needs     Financial resource strain: Not on file     Food insecurity:     Worry: Not on file     Inability: Not on file     Transportation needs:     Medical: Not on file     Non-medical: Not on file   Tobacco Use     Smoking status: Never Smoker     Smokeless tobacco: Never Used   Substance and Sexual Activity     Alcohol use: Not Currently     Drug use: Never     Sexual activity:  Not Currently   Lifestyle     Physical activity:     Days per week: Not on file     Minutes per session: Not on file     Stress: Not on file   Relationships     Social connections:     Talks on phone: Not on file     Gets together: Not on file     Attends [employer] service: Not on file     Active member of club or organization: Not on file     Attends meetings of clubs or organizations: Not on file     Relationship status: Not on file     Intimate partner violence:     Fear of current or ex partner: Not on file     Emotionally abused: Not on file     Physically abused: Not on file     Forced sexual activity: Not on file   Other Topics Concern     Not on file   Social History Narrative     Not on file         Review of Systems  Currently she denies chills and fever coughing wheezing chest pain dizziness or vertigo nausea vomiting diarrhea dysuria flulike symptoms headache or stiff neck.  History of cognitive impairment hyperlipidemia and syncopal episodes and most recently bilateral ankle fracture secondary to a fall.  Neuropathic pain.       Current Outpatient Medications   Medication Sig     acetaminophen (TYLENOL) 500 MG tablet Take 1 tablet (500 mg total) by mouth every 6 (six) hours as needed for pain or fever.     aspirin 81 mg chewable tablet Chew 1 tablet (81 mg total) 2 (two) times a day.     bisacodyl (DULCOLAX) 10 mg suppository Insert suppository rectally daily as needed for treatment of constipation.     donepezil (ARICEPT) 10 MG tablet Take 10 mg by mouth at bedtime.     gabapentin (NEURONTIN) 600 MG tablet Take 700 mg by mouth 2 (two) times a day.      magnesium hydroxide (MILK OF MAG) 400 mg/5 mL Susp suspension Take 30 mL by mouth daily as needed.     midodrine (PROAMATINE) 10 MG tablet Take 5 mg by mouth 3 (three) times a day with meals.      polyethylene glycol (MIRALAX) 17 gram packet Take 1 packet (17 g total) by mouth daily as needed.     polyvinyl alcohol (LIQUIFILM TEARS) 1.4 % ophthalmic  solution Apply 1 drop to eye as needed for dry eyes.     rosuvastatin (CRESTOR) 10 MG tablet Take 10 mg by mouth daily.        There were no vitals filed for this visit.  Blood pressure 110/71 pulse 69 respiration 16 temperature 98.9  Physical Exam   Head is normocephalic.    Neck is supple without adenopathy.  Lungs are clear throughout.  Cardiovascular is normal without murmurs.  No lower extremity edema.  Gastrointestinal nondistended.  Musculoskeletal moves upper extremities.  Bilateral lower extremities in Cam boots.  Positive CMS to her feet as well as popliteal pulses.  Psychiatric Pleasant affect.  Does have cognitive impairment.        LABS:   Lab Results   Component Value Date    WBC 7.5 12/30/2019    HGB 11.3 (L) 12/30/2019    HCT 39.1 12/29/2019    MCV 93 12/29/2019     12/29/2019     Results for orders placed or performed during the hospital encounter of 12/29/19   Basic metabolic panel   Result Value Ref Range    Sodium 140 136 - 145 mmol/L    Potassium 4.2 3.5 - 5.0 mmol/L    Chloride 107 98 - 107 mmol/L    CO2 25 22 - 31 mmol/L    Anion Gap, Calculation 8 5 - 18 mmol/L    Glucose 96 70 - 125 mg/dL    Calcium 9.1 8.5 - 10.5 mg/dL    BUN 19 8 - 28 mg/dL    Creatinine 0.87 0.60 - 1.10 mg/dL    GFR MDRD Af Amer >60 >60 mL/min/1.73m2    GFR MDRD Non Af Amer >60 >60 mL/min/1.73m2           ASSESSMENT:      ICD-10-CM    1. Closed fracture of both ankles with routine healing, subsequent encounter S82.891D     S82.892D    2. Hypotension, unspecified hypotension type I95.9    3. Neuropathy G62.9    4. Physical debility R53.81        PLAN:    No other new changes at this time.  Did see orthopedics yesterday but I do not have those notes so I really do not know what their next plan is although the  does tell me that they are going to follow-up again in about 2 weeks the meantime to still do some therapy and some slide board transfers.    Electronically signed by: Michael Duane Johnson, CNP   Unknown

## 2024-01-15 DIAGNOSIS — E78.5 HYPERLIPIDEMIA, UNSPECIFIED HYPERLIPIDEMIA TYPE: ICD-10-CM

## 2024-01-15 RX ORDER — DONEPEZIL HYDROCHLORIDE 10 MG/1
10 TABLET, FILM COATED ORAL AT BEDTIME
Qty: 90 TABLET | Refills: 3 | OUTPATIENT
Start: 2024-01-15

## 2024-01-15 RX ORDER — ROSUVASTATIN CALCIUM 10 MG/1
10 TABLET, COATED ORAL DAILY
Qty: 90 TABLET | Refills: 3 | OUTPATIENT
Start: 2024-01-15

## 2025-04-09 ENCOUNTER — DOCUMENTATION ONLY (OUTPATIENT)
Dept: OTHER | Facility: CLINIC | Age: OVER 89
End: 2025-04-09
Payer: COMMERCIAL